# Patient Record
Sex: FEMALE | Race: WHITE | HISPANIC OR LATINO | URBAN - METROPOLITAN AREA
[De-identification: names, ages, dates, MRNs, and addresses within clinical notes are randomized per-mention and may not be internally consistent; named-entity substitution may affect disease eponyms.]

---

## 2017-06-28 ENCOUNTER — OUTPATIENT (OUTPATIENT)
Dept: OUTPATIENT SERVICES | Facility: HOSPITAL | Age: 52
LOS: 1 days | End: 2017-06-28

## 2017-06-28 ENCOUNTER — LABORATORY RESULT (OUTPATIENT)
Age: 52
End: 2017-06-28

## 2017-06-28 ENCOUNTER — APPOINTMENT (OUTPATIENT)
Dept: INTERNAL MEDICINE | Facility: HOSPITAL | Age: 52
End: 2017-06-28

## 2017-06-28 VITALS — BODY MASS INDEX: 32.27 KG/M2 | HEIGHT: 64 IN | WEIGHT: 189 LBS

## 2017-06-28 VITALS — SYSTOLIC BLOOD PRESSURE: 130 MMHG | DIASTOLIC BLOOD PRESSURE: 80 MMHG

## 2017-06-28 LAB
ALBUMIN SERPL ELPH-MCNC: 4.2 G/DL — SIGNIFICANT CHANGE UP (ref 3.3–5)
ALP SERPL-CCNC: 56 U/L — SIGNIFICANT CHANGE UP (ref 40–120)
ALT FLD-CCNC: 24 U/L — SIGNIFICANT CHANGE UP (ref 4–33)
AST SERPL-CCNC: 21 U/L — SIGNIFICANT CHANGE UP (ref 4–32)
BASOPHILS # BLD AUTO: 0.07 K/UL — SIGNIFICANT CHANGE UP (ref 0–0.2)
BASOPHILS NFR BLD AUTO: 0.9 % — SIGNIFICANT CHANGE UP (ref 0–2)
BILIRUB SERPL-MCNC: 0.2 MG/DL — SIGNIFICANT CHANGE UP (ref 0.2–1.2)
BUN SERPL-MCNC: 12 MG/DL — SIGNIFICANT CHANGE UP (ref 7–23)
CALCIUM SERPL-MCNC: 9.6 MG/DL — SIGNIFICANT CHANGE UP (ref 8.4–10.5)
CHLORIDE SERPL-SCNC: 103 MMOL/L — SIGNIFICANT CHANGE UP (ref 98–107)
CO2 SERPL-SCNC: 24 MMOL/L — SIGNIFICANT CHANGE UP (ref 22–31)
CREAT SERPL-MCNC: 0.78 MG/DL — SIGNIFICANT CHANGE UP (ref 0.5–1.3)
EOSINOPHIL # BLD AUTO: 0.36 K/UL — SIGNIFICANT CHANGE UP (ref 0–0.5)
EOSINOPHIL NFR BLD AUTO: 4.6 % — SIGNIFICANT CHANGE UP (ref 0–6)
GLUCOSE SERPL-MCNC: 77 MG/DL — SIGNIFICANT CHANGE UP (ref 70–99)
HBA1C BLD-MCNC: 6.1 % — HIGH (ref 4–5.6)
HCT VFR BLD CALC: 42.1 % — SIGNIFICANT CHANGE UP (ref 34.5–45)
HGB BLD-MCNC: 13.8 G/DL — SIGNIFICANT CHANGE UP (ref 11.5–15.5)
IMM GRANULOCYTES # BLD AUTO: 0.02 # — SIGNIFICANT CHANGE UP
IMM GRANULOCYTES NFR BLD AUTO: 0.3 % — SIGNIFICANT CHANGE UP (ref 0–1.5)
LYMPHOCYTES # BLD AUTO: 2.36 K/UL — SIGNIFICANT CHANGE UP (ref 1–3.3)
LYMPHOCYTES # BLD AUTO: 29.8 % — SIGNIFICANT CHANGE UP (ref 13–44)
MCHC RBC-ENTMCNC: 27.4 PG — SIGNIFICANT CHANGE UP (ref 27–34)
MCHC RBC-ENTMCNC: 32.8 % — SIGNIFICANT CHANGE UP (ref 32–36)
MCV RBC AUTO: 83.7 FL — SIGNIFICANT CHANGE UP (ref 80–100)
MONOCYTES # BLD AUTO: 0.56 K/UL — SIGNIFICANT CHANGE UP (ref 0–0.9)
MONOCYTES NFR BLD AUTO: 7.1 % — SIGNIFICANT CHANGE UP (ref 2–14)
NEUTROPHILS # BLD AUTO: 4.54 K/UL — SIGNIFICANT CHANGE UP (ref 1.8–7.4)
NEUTROPHILS NFR BLD AUTO: 57.3 % — SIGNIFICANT CHANGE UP (ref 43–77)
NRBC # FLD: 0 — SIGNIFICANT CHANGE UP
PLATELET # BLD AUTO: 296 K/UL — SIGNIFICANT CHANGE UP (ref 150–400)
PMV BLD: 10.3 FL — SIGNIFICANT CHANGE UP (ref 7–13)
POTASSIUM SERPL-MCNC: 3.9 MMOL/L — SIGNIFICANT CHANGE UP (ref 3.5–5.3)
POTASSIUM SERPL-SCNC: 3.9 MMOL/L — SIGNIFICANT CHANGE UP (ref 3.5–5.3)
PROT SERPL-MCNC: 7.7 G/DL — SIGNIFICANT CHANGE UP (ref 6–8.3)
RBC # BLD: 5.03 M/UL — SIGNIFICANT CHANGE UP (ref 3.8–5.2)
RBC # FLD: 12.1 % — SIGNIFICANT CHANGE UP (ref 10.3–14.5)
SODIUM SERPL-SCNC: 143 MMOL/L — SIGNIFICANT CHANGE UP (ref 135–145)
T4 FREE SERPL-MCNC: 1.33 NG/DL — SIGNIFICANT CHANGE UP (ref 0.9–1.8)
TSH SERPL-MCNC: 1.48 UIU/ML — SIGNIFICANT CHANGE UP (ref 0.27–4.2)
WBC # BLD: 7.91 K/UL — SIGNIFICANT CHANGE UP (ref 3.8–10.5)
WBC # FLD AUTO: 7.91 K/UL — SIGNIFICANT CHANGE UP (ref 3.8–10.5)

## 2017-06-30 DIAGNOSIS — Z00.00 ENCOUNTER FOR GENERAL ADULT MEDICAL EXAMINATION WITHOUT ABNORMAL FINDINGS: ICD-10-CM

## 2017-06-30 DIAGNOSIS — R73.03 PREDIABETES: ICD-10-CM

## 2017-06-30 DIAGNOSIS — I10 ESSENTIAL (PRIMARY) HYPERTENSION: ICD-10-CM

## 2017-06-30 DIAGNOSIS — E07.9 DISORDER OF THYROID, UNSPECIFIED: ICD-10-CM

## 2017-08-04 ENCOUNTER — APPOINTMENT (OUTPATIENT)
Dept: ENDOCRINOLOGY | Facility: HOSPITAL | Age: 52
End: 2017-08-04

## 2017-08-12 ENCOUNTER — APPOINTMENT (OUTPATIENT)
Dept: MAMMOGRAPHY | Facility: IMAGING CENTER | Age: 52
End: 2017-08-12

## 2017-08-12 ENCOUNTER — APPOINTMENT (OUTPATIENT)
Dept: OBGYN | Facility: HOSPITAL | Age: 52
End: 2017-08-12

## 2017-09-09 ENCOUNTER — APPOINTMENT (OUTPATIENT)
Dept: MAMMOGRAPHY | Facility: IMAGING CENTER | Age: 52
End: 2017-09-09

## 2017-09-09 ENCOUNTER — APPOINTMENT (OUTPATIENT)
Dept: OBGYN | Facility: HOSPITAL | Age: 52
End: 2017-09-09

## 2017-10-14 ENCOUNTER — OUTPATIENT (OUTPATIENT)
Dept: OUTPATIENT SERVICES | Facility: HOSPITAL | Age: 52
LOS: 1 days | End: 2017-10-14
Payer: COMMERCIAL

## 2017-10-14 ENCOUNTER — APPOINTMENT (OUTPATIENT)
Dept: OBGYN | Facility: HOSPITAL | Age: 52
End: 2017-10-14

## 2017-10-14 ENCOUNTER — OUTPATIENT (OUTPATIENT)
Dept: OUTPATIENT SERVICES | Facility: HOSPITAL | Age: 52
LOS: 1 days | End: 2017-10-14

## 2017-10-14 ENCOUNTER — APPOINTMENT (OUTPATIENT)
Dept: MAMMOGRAPHY | Facility: IMAGING CENTER | Age: 52
End: 2017-10-14
Payer: COMMERCIAL

## 2017-10-14 DIAGNOSIS — Z00.8 ENCOUNTER FOR OTHER GENERAL EXAMINATION: ICD-10-CM

## 2017-10-14 DIAGNOSIS — Z12.39 ENCOUNTER FOR OTHER SCREENING FOR MALIGNANT NEOPLASM OF BREAST: ICD-10-CM

## 2017-10-14 PROCEDURE — 77063 BREAST TOMOSYNTHESIS BI: CPT | Mod: 26

## 2017-10-14 PROCEDURE — G0202: CPT | Mod: 26

## 2017-10-14 PROCEDURE — 77067 SCR MAMMO BI INCL CAD: CPT

## 2017-10-14 PROCEDURE — 77063 BREAST TOMOSYNTHESIS BI: CPT

## 2018-01-24 ENCOUNTER — OUTPATIENT (OUTPATIENT)
Dept: OUTPATIENT SERVICES | Facility: HOSPITAL | Age: 53
LOS: 1 days | End: 2018-01-24

## 2018-01-24 ENCOUNTER — APPOINTMENT (OUTPATIENT)
Dept: INTERNAL MEDICINE | Facility: HOSPITAL | Age: 53
End: 2018-01-24
Payer: MEDICAID

## 2018-01-24 ENCOUNTER — LABORATORY RESULT (OUTPATIENT)
Age: 53
End: 2018-01-24

## 2018-01-24 VITALS — WEIGHT: 190 LBS | BODY MASS INDEX: 32.44 KG/M2 | HEIGHT: 64 IN

## 2018-01-24 VITALS — DIASTOLIC BLOOD PRESSURE: 78 MMHG | SYSTOLIC BLOOD PRESSURE: 130 MMHG

## 2018-01-24 LAB
ALBUMIN SERPL ELPH-MCNC: 4.3 G/DL — SIGNIFICANT CHANGE UP (ref 3.3–5)
ALP SERPL-CCNC: 60 U/L — SIGNIFICANT CHANGE UP (ref 40–120)
ALT FLD-CCNC: 23 U/L — SIGNIFICANT CHANGE UP (ref 4–33)
AST SERPL-CCNC: 22 U/L — SIGNIFICANT CHANGE UP (ref 4–32)
BASOPHILS # BLD AUTO: 0.1 K/UL — SIGNIFICANT CHANGE UP (ref 0–0.2)
BASOPHILS NFR BLD AUTO: 1.3 % — SIGNIFICANT CHANGE UP (ref 0–2)
BILIRUB SERPL-MCNC: 0.2 MG/DL — SIGNIFICANT CHANGE UP (ref 0.2–1.2)
BUN SERPL-MCNC: 11 MG/DL — SIGNIFICANT CHANGE UP (ref 7–23)
CALCIUM SERPL-MCNC: 9.2 MG/DL — SIGNIFICANT CHANGE UP (ref 8.4–10.5)
CHLORIDE SERPL-SCNC: 104 MMOL/L — SIGNIFICANT CHANGE UP (ref 98–107)
CHOLEST SERPL-MCNC: 142 MG/DL — SIGNIFICANT CHANGE UP (ref 120–199)
CO2 SERPL-SCNC: 25 MMOL/L — SIGNIFICANT CHANGE UP (ref 22–31)
CREAT SERPL-MCNC: 0.79 MG/DL — SIGNIFICANT CHANGE UP (ref 0.5–1.3)
EOSINOPHIL # BLD AUTO: 0.32 K/UL — SIGNIFICANT CHANGE UP (ref 0–0.5)
EOSINOPHIL NFR BLD AUTO: 4.3 % — SIGNIFICANT CHANGE UP (ref 0–6)
GLUCOSE SERPL-MCNC: 95 MG/DL — SIGNIFICANT CHANGE UP (ref 70–99)
HBA1C BLD-MCNC: 6.1 % — HIGH (ref 4–5.6)
HCT VFR BLD CALC: 42.3 % — SIGNIFICANT CHANGE UP (ref 34.5–45)
HDLC SERPL-MCNC: 52 MG/DL — SIGNIFICANT CHANGE UP (ref 45–65)
HGB BLD-MCNC: 13.4 G/DL — SIGNIFICANT CHANGE UP (ref 11.5–15.5)
IMM GRANULOCYTES # BLD AUTO: 0.02 # — SIGNIFICANT CHANGE UP
IMM GRANULOCYTES NFR BLD AUTO: 0.3 % — SIGNIFICANT CHANGE UP (ref 0–1.5)
LIPID PNL WITH DIRECT LDL SERPL: 81 MG/DL — SIGNIFICANT CHANGE UP
LYMPHOCYTES # BLD AUTO: 2.62 K/UL — SIGNIFICANT CHANGE UP (ref 1–3.3)
LYMPHOCYTES # BLD AUTO: 35.3 % — SIGNIFICANT CHANGE UP (ref 13–44)
MCHC RBC-ENTMCNC: 26.7 PG — LOW (ref 27–34)
MCHC RBC-ENTMCNC: 31.7 % — LOW (ref 32–36)
MCV RBC AUTO: 84.3 FL — SIGNIFICANT CHANGE UP (ref 80–100)
MONOCYTES # BLD AUTO: 0.54 K/UL — SIGNIFICANT CHANGE UP (ref 0–0.9)
MONOCYTES NFR BLD AUTO: 7.3 % — SIGNIFICANT CHANGE UP (ref 2–14)
NEUTROPHILS # BLD AUTO: 3.83 K/UL — SIGNIFICANT CHANGE UP (ref 1.8–7.4)
NEUTROPHILS NFR BLD AUTO: 51.5 % — SIGNIFICANT CHANGE UP (ref 43–77)
NRBC # FLD: 0 — SIGNIFICANT CHANGE UP
PLATELET # BLD AUTO: 333 K/UL — SIGNIFICANT CHANGE UP (ref 150–400)
PMV BLD: 10.2 FL — SIGNIFICANT CHANGE UP (ref 7–13)
POTASSIUM SERPL-MCNC: 3.7 MMOL/L — SIGNIFICANT CHANGE UP (ref 3.5–5.3)
POTASSIUM SERPL-SCNC: 3.7 MMOL/L — SIGNIFICANT CHANGE UP (ref 3.5–5.3)
PROT SERPL-MCNC: 7.6 G/DL — SIGNIFICANT CHANGE UP (ref 6–8.3)
RBC # BLD: 5.02 M/UL — SIGNIFICANT CHANGE UP (ref 3.8–5.2)
RBC # FLD: 12.1 % — SIGNIFICANT CHANGE UP (ref 10.3–14.5)
SODIUM SERPL-SCNC: 143 MMOL/L — SIGNIFICANT CHANGE UP (ref 135–145)
T4 FREE SERPL-MCNC: 1.42 NG/DL — SIGNIFICANT CHANGE UP (ref 0.9–1.8)
TRIGL SERPL-MCNC: 119 MG/DL — SIGNIFICANT CHANGE UP (ref 10–149)
TSH SERPL-MCNC: 1.04 UIU/ML — SIGNIFICANT CHANGE UP (ref 0.27–4.2)
WBC # BLD: 7.43 K/UL — SIGNIFICANT CHANGE UP (ref 3.8–10.5)
WBC # FLD AUTO: 7.43 K/UL — SIGNIFICANT CHANGE UP (ref 3.8–10.5)

## 2018-01-24 PROCEDURE — 99213 OFFICE O/P EST LOW 20 MIN: CPT | Mod: GE

## 2018-01-24 RX ORDER — HYDROCORTISONE 10 MG/G
1 OINTMENT TOPICAL
Qty: 1 | Refills: 0 | Status: DISCONTINUED | COMMUNITY
Start: 2017-06-28 | End: 2018-01-24

## 2018-01-25 DIAGNOSIS — E66.9 OBESITY, UNSPECIFIED: ICD-10-CM

## 2018-01-25 DIAGNOSIS — Z00.00 ENCOUNTER FOR GENERAL ADULT MEDICAL EXAMINATION WITHOUT ABNORMAL FINDINGS: ICD-10-CM

## 2018-01-25 DIAGNOSIS — R73.03 PREDIABETES: ICD-10-CM

## 2018-01-25 DIAGNOSIS — E04.2 NONTOXIC MULTINODULAR GOITER: ICD-10-CM

## 2018-01-25 DIAGNOSIS — I10 ESSENTIAL (PRIMARY) HYPERTENSION: ICD-10-CM

## 2018-02-05 ENCOUNTER — APPOINTMENT (OUTPATIENT)
Dept: OPHTHALMOLOGY | Facility: CLINIC | Age: 53
End: 2018-02-05

## 2018-02-05 ENCOUNTER — OUTPATIENT (OUTPATIENT)
Dept: OUTPATIENT SERVICES | Facility: HOSPITAL | Age: 53
LOS: 1 days | End: 2018-02-05

## 2018-03-30 ENCOUNTER — OUTPATIENT (OUTPATIENT)
Dept: OUTPATIENT SERVICES | Facility: HOSPITAL | Age: 53
LOS: 1 days | End: 2018-03-30

## 2018-03-30 ENCOUNTER — APPOINTMENT (OUTPATIENT)
Dept: ENDOCRINOLOGY | Facility: HOSPITAL | Age: 53
End: 2018-03-30

## 2018-03-30 VITALS
HEART RATE: 84 BPM | HEIGHT: 64 IN | SYSTOLIC BLOOD PRESSURE: 148 MMHG | WEIGHT: 194 LBS | BODY MASS INDEX: 33.12 KG/M2 | DIASTOLIC BLOOD PRESSURE: 80 MMHG

## 2018-04-02 DIAGNOSIS — E04.2 NONTOXIC MULTINODULAR GOITER: ICD-10-CM

## 2018-04-02 DIAGNOSIS — R73.03 PREDIABETES: ICD-10-CM

## 2018-04-11 ENCOUNTER — APPOINTMENT (OUTPATIENT)
Dept: ULTRASOUND IMAGING | Facility: IMAGING CENTER | Age: 53
End: 2018-04-11
Payer: MEDICAID

## 2018-04-11 ENCOUNTER — OUTPATIENT (OUTPATIENT)
Dept: OUTPATIENT SERVICES | Facility: HOSPITAL | Age: 53
LOS: 1 days | End: 2018-04-11
Payer: MEDICAID

## 2018-04-11 DIAGNOSIS — E04.2 NONTOXIC MULTINODULAR GOITER: ICD-10-CM

## 2018-04-11 PROCEDURE — 76536 US EXAM OF HEAD AND NECK: CPT

## 2018-04-11 PROCEDURE — 76536 US EXAM OF HEAD AND NECK: CPT | Mod: 26

## 2018-07-17 ENCOUNTER — RX RENEWAL (OUTPATIENT)
Age: 53
End: 2018-07-17

## 2018-09-28 ENCOUNTER — APPOINTMENT (OUTPATIENT)
Dept: ENDOCRINOLOGY | Facility: HOSPITAL | Age: 53
End: 2018-09-28

## 2018-11-05 ENCOUNTER — MESSAGE (OUTPATIENT)
Age: 53
End: 2018-11-05

## 2018-11-17 ENCOUNTER — OUTPATIENT (OUTPATIENT)
Dept: OUTPATIENT SERVICES | Facility: HOSPITAL | Age: 53
LOS: 1 days | End: 2018-11-17
Payer: COMMERCIAL

## 2018-11-17 ENCOUNTER — APPOINTMENT (OUTPATIENT)
Dept: MAMMOGRAPHY | Facility: IMAGING CENTER | Age: 53
End: 2018-11-17
Payer: COMMERCIAL

## 2018-11-17 ENCOUNTER — APPOINTMENT (OUTPATIENT)
Dept: OBGYN | Facility: HOSPITAL | Age: 53
End: 2018-11-17

## 2018-11-17 ENCOUNTER — OUTPATIENT (OUTPATIENT)
Dept: OUTPATIENT SERVICES | Facility: HOSPITAL | Age: 53
LOS: 1 days | End: 2018-11-17

## 2018-11-17 DIAGNOSIS — Z12.39 ENCOUNTER FOR OTHER SCREENING FOR MALIGNANT NEOPLASM OF BREAST: ICD-10-CM

## 2018-11-17 DIAGNOSIS — Z00.8 ENCOUNTER FOR OTHER GENERAL EXAMINATION: ICD-10-CM

## 2018-11-17 PROCEDURE — 77067 SCR MAMMO BI INCL CAD: CPT | Mod: 26

## 2018-11-17 PROCEDURE — 77067 SCR MAMMO BI INCL CAD: CPT

## 2018-11-17 PROCEDURE — 77063 BREAST TOMOSYNTHESIS BI: CPT | Mod: 26

## 2018-11-17 PROCEDURE — 77063 BREAST TOMOSYNTHESIS BI: CPT

## 2018-11-19 ENCOUNTER — APPOINTMENT (OUTPATIENT)
Dept: INTERNAL MEDICINE | Facility: HOSPITAL | Age: 53
End: 2018-11-19

## 2019-01-02 ENCOUNTER — RX RENEWAL (OUTPATIENT)
Age: 54
End: 2019-01-02

## 2019-01-07 ENCOUNTER — LABORATORY RESULT (OUTPATIENT)
Age: 54
End: 2019-01-07

## 2019-01-07 ENCOUNTER — APPOINTMENT (OUTPATIENT)
Dept: INTERNAL MEDICINE | Facility: HOSPITAL | Age: 54
End: 2019-01-07
Payer: MEDICAID

## 2019-01-07 ENCOUNTER — OUTPATIENT (OUTPATIENT)
Dept: OUTPATIENT SERVICES | Facility: HOSPITAL | Age: 54
LOS: 1 days | End: 2019-01-07

## 2019-01-07 VITALS — HEIGHT: 66 IN | WEIGHT: 197 LBS | BODY MASS INDEX: 31.66 KG/M2

## 2019-01-07 VITALS — RESPIRATION RATE: 14 BRPM | HEART RATE: 78 BPM | SYSTOLIC BLOOD PRESSURE: 134 MMHG | DIASTOLIC BLOOD PRESSURE: 84 MMHG

## 2019-01-07 DIAGNOSIS — E04.1 NONTOXIC SINGLE THYROID NODULE: ICD-10-CM

## 2019-01-07 DIAGNOSIS — E07.9 DISORDER OF THYROID, UNSPECIFIED: ICD-10-CM

## 2019-01-07 DIAGNOSIS — Z87.442 PERSONAL HISTORY OF URINARY CALCULI: ICD-10-CM

## 2019-01-07 LAB
ALBUMIN SERPL ELPH-MCNC: 4.5 G/DL — SIGNIFICANT CHANGE UP (ref 3.3–5)
ALP SERPL-CCNC: 60 U/L — SIGNIFICANT CHANGE UP (ref 40–120)
ALT FLD-CCNC: 25 U/L — SIGNIFICANT CHANGE UP (ref 4–33)
AST SERPL-CCNC: 22 U/L — SIGNIFICANT CHANGE UP (ref 4–32)
BILIRUB SERPL-MCNC: 0.3 MG/DL — SIGNIFICANT CHANGE UP (ref 0.2–1.2)
BUN SERPL-MCNC: 10 MG/DL — SIGNIFICANT CHANGE UP (ref 7–23)
CALCIUM SERPL-MCNC: 9.5 MG/DL — SIGNIFICANT CHANGE UP (ref 8.4–10.5)
CHLORIDE SERPL-SCNC: 107 MMOL/L — SIGNIFICANT CHANGE UP (ref 98–107)
CHOLEST SERPL-MCNC: 153 MG/DL — SIGNIFICANT CHANGE UP (ref 120–199)
CO2 SERPL-SCNC: 25 MMOL/L — SIGNIFICANT CHANGE UP (ref 22–31)
CREAT SERPL-MCNC: 0.67 MG/DL — SIGNIFICANT CHANGE UP (ref 0.5–1.3)
GLUCOSE SERPL-MCNC: 99 MG/DL — SIGNIFICANT CHANGE UP (ref 70–99)
HBA1C BLD-MCNC: 6.2 % — HIGH (ref 4–5.6)
HDLC SERPL-MCNC: 54 MG/DL — SIGNIFICANT CHANGE UP (ref 45–65)
HEMOCCULT STL QL IA: NEGATIVE
LIPID PNL WITH DIRECT LDL SERPL: 93 MG/DL — SIGNIFICANT CHANGE UP
POTASSIUM SERPL-MCNC: 3.8 MMOL/L — SIGNIFICANT CHANGE UP (ref 3.5–5.3)
POTASSIUM SERPL-SCNC: 3.8 MMOL/L — SIGNIFICANT CHANGE UP (ref 3.5–5.3)
PROT SERPL-MCNC: 7.8 G/DL — SIGNIFICANT CHANGE UP (ref 6–8.3)
SODIUM SERPL-SCNC: 143 MMOL/L — SIGNIFICANT CHANGE UP (ref 135–145)
T4 FREE SERPL-MCNC: 1.4 NG/DL — SIGNIFICANT CHANGE UP (ref 0.9–1.8)
TRIGL SERPL-MCNC: 75 MG/DL — SIGNIFICANT CHANGE UP (ref 10–149)
TSH SERPL-MCNC: 0.96 UIU/ML — SIGNIFICANT CHANGE UP (ref 0.27–4.2)

## 2019-01-07 PROCEDURE — 99396 PREV VISIT EST AGE 40-64: CPT | Mod: GC

## 2019-01-09 DIAGNOSIS — R73.03 PREDIABETES: ICD-10-CM

## 2019-01-09 DIAGNOSIS — E66.9 OBESITY, UNSPECIFIED: ICD-10-CM

## 2019-01-09 DIAGNOSIS — Z00.00 ENCOUNTER FOR GENERAL ADULT MEDICAL EXAMINATION WITHOUT ABNORMAL FINDINGS: ICD-10-CM

## 2019-01-09 DIAGNOSIS — E04.2 NONTOXIC MULTINODULAR GOITER: ICD-10-CM

## 2019-01-09 DIAGNOSIS — I10 ESSENTIAL (PRIMARY) HYPERTENSION: ICD-10-CM

## 2019-01-09 NOTE — REVIEW OF SYSTEMS
[Fatigue] : fatigue [Negative] : Heme/Lymph [Fever] : no fever [Chills] : no chills [Hot Flashes] : no hot flashes [Night Sweats] : no night sweats [Recent Change In Weight] : ~T no recent weight change

## 2019-01-09 NOTE — ASSESSMENT
[FreeTextEntry1] : 53F Urdu-speaking  with HTN, pre-DM (HgbA1c 6.1), obesity, multinodular goiter, h/o nephrolithiasis presents for her annual physical exam. \par \par # Multinodular Goiter: asymptomatic aside from longstanding fatigue\par - Will check TSH and FT4\par - Endocrinology referral\par - Will task endocrinology to find out what type of imaging they would like pt to receive prior to her appointment\par \par # HTN: /84 today\par - Continue amlodipine 10 mg, lisinopril 10 mg, metoprolol tartrate 50 mg BID; renewed all today\par - Prescribed BP cuff, instructed pt to document BP at home\par \par # Obesity\par - Discussed low carbohydrate diet and exercise\par \par # HCM\par - HgbA1c and lipid panel today\par - Mammogram 11/2018 birads 2, repeat in 1 year\par - Cervical cancer screening 2/2018 negative\par - FIT test negative in 2017, pt just sent repeat from Gyn clinic - will follow up\par - Provided GI referral for colonoscopy, pt agreeable\par - Tdap 2014\par - Pt did not receive influenza vaccine but will instruct her to get it at her pharmacy when I call to discuss lab results\par

## 2019-01-09 NOTE — HISTORY OF PRESENT ILLNESS
[FreeTextEntry1] : I'm here for my physical exam [de-identified] : 53F Montserratian-speaking  with HTN, pre-DM (HgbA1c 6.1), obesity, multinodular goiter, h/o nephrolithiasis presents for her annual physical exam. She has no complaints, ROS positive only for fatigue. Her fatigue is longstanding and unchanged, worse when her children are in school because she wakes up at 6 am to help them get to school, goes to bed late around midnight. No weight loss, appetite changes, or symptoms of depression. No anxiety, palpitations, or diarrhea. Pt had a thyroid ultrasound for follow up of her multinodular goiter. It was unchanged from previous with b/l thyroid nodules largest was on L 6.1 x 5 x 4.4 cm but with solid components. Pt had a follow up endocrinology appointment 2018 which she missed. Pt says she would like to make sure all of her cancer screening is up to date as her brother  of gastric cancer and a second brother was recently diagnosed with hematologic malignancy. \par \par  IDs: [id number], [id number]\par \par \par \par

## 2019-01-09 NOTE — PHYSICAL EXAM
[No Acute Distress] : no acute distress [Well-Appearing] : well-appearing [Normal Sclera/Conjunctiva] : normal sclera/conjunctiva [EOMI] : extraocular movements intact [Normal Outer Ear/Nose] : the outer ears and nose were normal in appearance [Normal Oropharynx] : the oropharynx was normal [No JVD] : no jugular venous distention [No Lymphadenopathy] : no lymphadenopathy [Clear to Auscultation] : lungs were clear to auscultation bilaterally [No Accessory Muscle Use] : no accessory muscle use [Normal Rate] : normal rate  [Regular Rhythm] : with a regular rhythm [Normal S1, S2] : normal S1 and S2 [No Edema] : there was no peripheral edema [No Extremity Clubbing/Cyanosis] : no extremity clubbing/cyanosis [Soft] : abdomen soft [Non Tender] : non-tender [Non-distended] : non-distended [Normal Bowel Sounds] : normal bowel sounds [Normal Posterior Cervical Nodes] : no posterior cervical lymphadenopathy [Normal Anterior Cervical Nodes] : no anterior cervical lymphadenopathy [No CVA Tenderness] : no CVA  tenderness [No Spinal Tenderness] : no spinal tenderness [No Joint Swelling] : no joint swelling [Grossly Normal Strength/Tone] : grossly normal strength/tone [Normal Gait] : normal gait [No Focal Deficits] : no focal deficits [Normal Affect] : the affect was normal [Alert and Oriented x3] : oriented to person, place, and time [Normal Mood] : the mood was normal [de-identified] : enlarged thyroid, thyroid nodules L >>R

## 2019-02-01 ENCOUNTER — APPOINTMENT (OUTPATIENT)
Dept: ENDOCRINOLOGY | Facility: HOSPITAL | Age: 54
End: 2019-02-01
Payer: MEDICAID

## 2019-02-01 ENCOUNTER — OUTPATIENT (OUTPATIENT)
Dept: OUTPATIENT SERVICES | Facility: HOSPITAL | Age: 54
LOS: 1 days | End: 2019-02-01

## 2019-02-01 VITALS
DIASTOLIC BLOOD PRESSURE: 75 MMHG | WEIGHT: 197 LBS | BODY MASS INDEX: 31.66 KG/M2 | SYSTOLIC BLOOD PRESSURE: 138 MMHG | HEIGHT: 66 IN | HEART RATE: 96 BPM

## 2019-02-01 DIAGNOSIS — R73.03 PREDIABETES: ICD-10-CM

## 2019-02-01 DIAGNOSIS — E04.2 NONTOXIC MULTINODULAR GOITER: ICD-10-CM

## 2019-02-01 PROCEDURE — 99214 OFFICE O/P EST MOD 30 MIN: CPT | Mod: GC

## 2019-02-01 NOTE — END OF VISIT
[] : Fellow [FreeTextEntry3] : Agree with Dr. Moseley plan above. The patient has long-standing goiter which was biopsied in 2013.  Pathology results are not available in EMR,  Dr. Washington is going to call and obtain results if possible. She has no compressive symptoms, the nodule has remained stable in size Thyroid US surveillance for now and if no pathology can be obtained, can consider repeat USGFNA based on size [Time Spent: ___ minutes] : I have spent [unfilled] minutes of face to face time with the patient

## 2019-02-01 NOTE — DATA REVIEWED
[FreeTextEntry1] : EXAM:  US THYROID PARATHYROID     PROCEDURE DATE:  04/11/2018       INTERPRETATION:  CLINICAL INFORMATION: 53-year-old female for follow-up  thyroid nodules.  COMPARISON: 10/25/2016  TECHNIQUE: Sonography of the thyroid.   FINDINGS:  Right Lobe: 4.9 x 2 x 2.1 cm. Mid colloid nodule and lower pole nodule  with features of colloid nodule and coarse calcification 1.2 x 0.9 x 0.7  cm unchanged.  Left Lobe: 8.2 x 3.8 x 4.5 cm. Mid to lower pole soft tissue nodule with  solid components and coarse calcification 6.1 x 5 x 4.4 cm, unchanged  upon remeasurement of the nodule on the prior examination.   Isthmus: 4 mm.      Cervical Lymph Nodes: No enlarged or abnormal morphology cervical nodes.  IMPRESSION:   Extensive left lobe nodule unchanged from prior examination.  \par \par EXAM:  US THYROID PARATHYROID     PROCEDURE DATE:  10/25/2016     INTERPRETATION:  CLINICAL INFORMATION: 51-year-old female for follow-up  thyroid nodules.  COMPARISON: Ultrasound 05/02/2013  TECHNIQUE: Sonography of the thyroid.   FINDINGS:  RIGHT LOBE: 4.8 x 2.1 x 1.6 cm. Colloid nodule measuring 1.2 x 0.7 x 1.2  cm mid lobe and 1 x 0.7 x 0.8 cm lower pole nodule with coarse  calcifications unchanged in size.  LEFT LOBE: 7 x 4.6 x 4 cm. Extensive complex lower pole nodule decreased  in size since prior examination. 5.9 x 3.9 x 3.9 cm with coarse  calcifications.  ISTHMUS: 5 mm   CERVICAL NODES:No enlarged or abnormal morphology cervical nodes.  IMPRESSION:   Multinodular goiter with dominant left nodule slightly decreased in size.

## 2019-02-01 NOTE — PHYSICAL EXAM
[Alert] : alert [No Acute Distress] : no acute distress [Normal Sclera/Conjunctiva] : normal sclera/conjunctiva [No Proptosis] : no proptosis [No Neck Mass] : no neck mass was observed [Supple] : the neck was supple [No Respiratory Distress] : no respiratory distress [Normal Rate and Effort] : normal respiratory rhythm and effort [No Accessory Muscle Use] : no accessory muscle use [Clear to Auscultation] : lungs were clear to auscultation bilaterally [Normal Rate] : heart rate was normal  [Normal S1, S2] : normal S1 and S2 [Regular Rhythm] : with a regular rhythm [No Edema] : there was no peripheral edema [Normal Bowel Sounds] : normal bowel sounds [Not Tender] : non-tender [Soft] : abdomen soft [Not Distended] : not distended [No Masses] : no abdominal mass palpated [Normal Gait] : normal gait [No Clubbing, Cyanosis] : no clubbing  or cyanosis of the fingernails [Normal Strength/Tone] : muscle strength and tone were normal [No Motor Deficits] : the motor exam was normal [No Tremors] : no tremors [Oriented x3] : oriented to person, place, and time [Normal Insight/Judgement] : insight and judgment were intact [Normal Affect] : the affect was normal [Normal Mood] : the mood was normal [Abdominal Striae] : no abdominal striae [Acanthosis Nigricans] : no acanthosis nigricans [de-identified] : + right mid pole nodule, + large left thyroid nodule

## 2019-02-01 NOTE — ASSESSMENT
[FreeTextEntry1] : 52 y/o F with multinodular goiter and pre-diabetes\par \par 1. Multinodular goiter: appears stable\par - patient biochemically euthyroid on TFTs from January 2019\par - will repeat thyroid ultrasound \par - thyroid ultrasound images from April 2018 personally reviewed - left thyroid nodule appears to be entire left thyroid lobe, consistent with left goiter\par - previous pathology report from [facility] in 2013 unavailable - will try to obtain pathology report\par -  if previous pathology is benign and thyroid ultrasound reveal stability of large left thyroid nodule/goiter, will not repeat FNA \par - if patient develops symptoms from goiter (such as dyspnea, dysphagia, etc,) in the future - she can consider elective surgery\par \par 2. Pre-diabetes: most recent HbA1c 6.2% in January 2019. C/w diet and lifestyle modifications to prevent development of DM2\par \par Return visit in 6 months.

## 2019-02-01 NOTE — REVIEW OF SYSTEMS
[Hair Loss] : hair loss [Dry Skin] : dry skin [Heat Intolerance] : heat intolerant [Recent Weight Gain (___ Lbs)] : no recent weight gain [Recent Weight Loss (___ Lbs)] : no recent weight loss [Fever] : no fever [Chills] : no chills [Dysphagia] : no dysphagia [Dysphonia] : no dysphonia [Neck Pain] : no neck pain [Chest Pain] : no chest pain [Palpitations] : no palpitations [Lower Ext Edema] : no lower extremity edema [Shortness Of Breath] : no shortness of breath [Cough] : no cough [Nausea] : no nausea [Vomiting] : no vomiting was observed [Constipation] : no constipation [Diarrhea] : no diarrhea [Abdominal Pain] : no abdominal pain [Tremors] : no tremors [Cold Intolerance] : cold tolerant [Hot Flashes] : no hot flashes [Negative] : Integumentary

## 2019-02-01 NOTE — HISTORY OF PRESENT ILLNESS
[FreeTextEntry1] : Patient is a 52 y/o F here for follow up of multinodular goiter. [facility]  ID # [id number]. Patient was diagnosed with thyroid nodules 6 years ago when she had an ultrasound. States she had a biopsy of dominant left thyroid nodule, reportedly benign. No pathology report available at this time (FNA was done in 2013 on 6 cm left thyroid nodule - document available in [facility] stating that FNA was performed and progress note stating benign FNA, however no pathology available). Has never been on thyroid medications. \par \par She denies neck pain, dysphagia, dysphonia. No chest pain or SOB. No palpitations. No current abdominal pain, nausea, vomiting, diarrhea, constipation. Hair always falling out, but less so recently. Has dry skin. Reports heat intolerance, no cold intolerance. Weight has been stable. She states that she was on levothyroxine during one of her pregnancies about 18 years ago but has not been on thyroidal medications since then. She has no history of RT to the neck or surgery to the neck. Has never been on amiodarone or lithium. Reports her son was recently diagnosed with a thyroid problem, but does not know what it is. \par \par Patient also has a history of pre-diabetes. Last HbA1c was 6.2 in January 2019. Weight stable.

## 2019-02-05 ENCOUNTER — APPOINTMENT (OUTPATIENT)
Dept: OPHTHALMOLOGY | Facility: CLINIC | Age: 54
End: 2019-02-05

## 2019-02-06 ENCOUNTER — FORM ENCOUNTER (OUTPATIENT)
Age: 54
End: 2019-02-06

## 2019-02-07 ENCOUNTER — APPOINTMENT (OUTPATIENT)
Dept: ULTRASOUND IMAGING | Facility: IMAGING CENTER | Age: 54
End: 2019-02-07
Payer: MEDICAID

## 2019-02-07 ENCOUNTER — OUTPATIENT (OUTPATIENT)
Dept: OUTPATIENT SERVICES | Facility: HOSPITAL | Age: 54
LOS: 1 days | End: 2019-02-07
Payer: MEDICAID

## 2019-02-07 DIAGNOSIS — E04.2 NONTOXIC MULTINODULAR GOITER: ICD-10-CM

## 2019-02-07 PROCEDURE — 76536 US EXAM OF HEAD AND NECK: CPT

## 2019-02-07 PROCEDURE — 76536 US EXAM OF HEAD AND NECK: CPT | Mod: 26

## 2019-03-18 ENCOUNTER — APPOINTMENT (OUTPATIENT)
Dept: INTERNAL MEDICINE | Facility: HOSPITAL | Age: 54
End: 2019-03-18

## 2019-04-24 ENCOUNTER — OUTPATIENT (OUTPATIENT)
Dept: OUTPATIENT SERVICES | Facility: HOSPITAL | Age: 54
LOS: 1 days | End: 2019-04-24

## 2019-04-24 ENCOUNTER — RESULT CHARGE (OUTPATIENT)
Age: 54
End: 2019-04-24

## 2019-04-24 ENCOUNTER — APPOINTMENT (OUTPATIENT)
Dept: INTERNAL MEDICINE | Facility: HOSPITAL | Age: 54
End: 2019-04-24
Payer: MEDICAID

## 2019-04-24 ENCOUNTER — LABORATORY RESULT (OUTPATIENT)
Age: 54
End: 2019-04-24

## 2019-04-24 VITALS — BODY MASS INDEX: 31.18 KG/M2 | HEIGHT: 66 IN | WEIGHT: 194 LBS

## 2019-04-24 VITALS — SYSTOLIC BLOOD PRESSURE: 139 MMHG | HEART RATE: 75 BPM | DIASTOLIC BLOOD PRESSURE: 80 MMHG

## 2019-04-24 DIAGNOSIS — I10 ESSENTIAL (PRIMARY) HYPERTENSION: ICD-10-CM

## 2019-04-24 DIAGNOSIS — H54.7 UNSPECIFIED VISUAL LOSS: ICD-10-CM

## 2019-04-24 DIAGNOSIS — E66.9 OBESITY, UNSPECIFIED: ICD-10-CM

## 2019-04-24 DIAGNOSIS — J30.2 OTHER SEASONAL ALLERGIC RHINITIS: ICD-10-CM

## 2019-04-24 DIAGNOSIS — R73.03 PREDIABETES: ICD-10-CM

## 2019-04-24 LAB — HBA1C BLD-MCNC: 6.2 % — HIGH (ref 4–5.6)

## 2019-04-24 PROCEDURE — 99214 OFFICE O/P EST MOD 30 MIN: CPT | Mod: GC

## 2019-04-24 NOTE — PHYSICAL EXAM
[No Acute Distress] : no acute distress [Well Nourished] : well nourished [Well Developed] : well developed [Well-Appearing] : well-appearing [Normal Sclera/Conjunctiva] : normal sclera/conjunctiva [PERRL] : pupils equal round and reactive to light [EOMI] : extraocular movements intact [Normal Outer Ear/Nose] : the outer ears and nose were normal in appearance [Normal Oropharynx] : the oropharynx was normal [Supple] : supple [No Respiratory Distress] : no respiratory distress  [Clear to Auscultation] : lungs were clear to auscultation bilaterally [Normal Rate] : normal rate  [No Accessory Muscle Use] : no accessory muscle use [Regular Rhythm] : with a regular rhythm [No Murmur] : no murmur heard [Normal S1, S2] : normal S1 and S2 [No Edema] : there was no peripheral edema [No Extremity Clubbing/Cyanosis] : no extremity clubbing/cyanosis [Soft] : abdomen soft [Non Tender] : non-tender [Non-distended] : non-distended [No Masses] : no abdominal mass palpated [No HSM] : no HSM [Normal Bowel Sounds] : normal bowel sounds

## 2019-04-25 ENCOUNTER — RESULT REVIEW (OUTPATIENT)
Age: 54
End: 2019-04-25

## 2019-04-25 ENCOUNTER — APPOINTMENT (OUTPATIENT)
Dept: GASTROENTEROLOGY | Facility: HOSPITAL | Age: 54
End: 2019-04-25

## 2019-04-25 NOTE — HISTORY OF PRESENT ILLNESS
[de-identified] : 53F Slovak-speaking with HTN, pre-DM (HgbA1c 6.2), obesity, multinodular goiter, h/o nephrolithiasis presents for her follow up\par \par Patient has no complaints. Has GI appt next week for colonosocpy. BPs at home [id number]. \par \par  IDs:\par \par ROS otherwise negative. \par

## 2019-04-25 NOTE — ASSESSMENT
[FreeTextEntry1] : 54F Irish-speaking with HTN, pre-DM (HgbA1c 6.2), obesity, multinodular goiter, h/o nephrolithiasis presents for her annual physical exam. \par \par # Multinodular Goiter\par -Nodules stable\par -TFTS wnl \par \par # HTN: Hypertensive \par - Continue amlodipine 10 mg, lisinopril 10 mg, metoprolol tartrate 50 mg BID\par -Continues to be hypertensive, discussed with patient extensively for weight loss and cardiac exercise\par -If no improvement in 3 months, will switch from metoprolol to thiazide for optimal BP management \par \par # Obesity\par - Discussed low carbohydrate diet and exercise\par \par # HCM\par - HgbA1c today\par - Mammogram 11/2018 birads 2, repeat in 1 year\par - Cervical cancer screening 2/2018 negative\par - FIT test negative in 2017, pt just sent repeat from Gyn clinic - will follow up\par - Colonoscopy plan for next week \par - Tdap 2014\par - Influenza vaccine [facility]\par . \par Case discussed with Dr. Gill\par \par RTC in 3 months for BP management \par \par PC, PGY2 \par

## 2019-05-01 LAB — GLUCOSE BLDC GLUCOMTR-MCNC: 95

## 2019-05-09 ENCOUNTER — APPOINTMENT (OUTPATIENT)
Dept: DERMATOLOGY | Facility: CLINIC | Age: 54
End: 2019-05-09
Payer: MEDICAID

## 2019-05-09 VITALS — WEIGHT: 194 LBS | BODY MASS INDEX: 31.18 KG/M2 | HEIGHT: 66 IN

## 2019-05-09 DIAGNOSIS — D69.2 OTHER NONTHROMBOCYTOPENIC PURPURA: ICD-10-CM

## 2019-05-09 DIAGNOSIS — D23.9 OTHER BENIGN NEOPLASM OF SKIN, UNSPECIFIED: ICD-10-CM

## 2019-05-09 DIAGNOSIS — Z91.89 OTHER SPECIFIED PERSONAL RISK FACTORS, NOT ELSEWHERE CLASSIFIED: ICD-10-CM

## 2019-05-09 PROCEDURE — 99203 OFFICE O/P NEW LOW 30 MIN: CPT

## 2019-05-15 ENCOUNTER — APPOINTMENT (OUTPATIENT)
Dept: CARDIOLOGY | Facility: CLINIC | Age: 54
End: 2019-05-15
Payer: MEDICAID

## 2019-05-15 VITALS
BODY MASS INDEX: 31.66 KG/M2 | SYSTOLIC BLOOD PRESSURE: 143 MMHG | OXYGEN SATURATION: 98 % | WEIGHT: 197 LBS | HEIGHT: 66 IN | DIASTOLIC BLOOD PRESSURE: 80 MMHG | TEMPERATURE: 97.9 F | HEART RATE: 83 BPM

## 2019-05-15 PROCEDURE — 99204 OFFICE O/P NEW MOD 45 MIN: CPT

## 2019-05-15 NOTE — PHYSICAL EXAM
[Normal Appearance] : normal appearance [General Appearance - Well Developed] : well developed [Well Groomed] : well groomed [Normal Conjunctiva] : the conjunctiva exhibited no abnormalities [No Deformities] : no deformities [General Appearance - In No Acute Distress] : no acute distress [General Appearance - Well Nourished] : well nourished [Eyelids - No Xanthelasma] : the eyelids demonstrated no xanthelasmas [Normal Oral Mucosa] : normal oral mucosa [No Oral Pallor] : no oral pallor [No Oral Cyanosis] : no oral cyanosis [Normal Jugular Venous A Waves Present] : normal jugular venous A waves present [No Jugular Venous Zepeda A Waves] : no jugular venous zepeda A waves [Heart Rate And Rhythm] : heart rate and rhythm were normal [Normal Jugular Venous V Waves Present] : normal jugular venous V waves present [Heart Sounds] : normal S1 and S2 [Murmurs] : no murmurs present [Auscultation Breath Sounds / Voice Sounds] : lungs were clear to auscultation bilaterally [Exaggerated Use Of Accessory Muscles For Inspiration] : no accessory muscle use [Respiration, Rhythm And Depth] : normal respiratory rhythm and effort [Abdomen Mass (___ Cm)] : no abdominal mass palpated [Abdomen Tenderness] : non-tender [Abdomen Soft] : soft [Gait - Sufficient For Exercise Testing] : the gait was sufficient for exercise testing [Abnormal Walk] : normal gait [Nail Clubbing] : no clubbing of the fingernails [Cyanosis, Localized] : no localized cyanosis [] : no ischemic changes [Petechial Hemorrhages (___cm)] : no petechial hemorrhages [FreeTextEntry1] : + pitting edema.  hyperpigmentation.  very midl reticular veins scattered (very mild).  strong pedal pulses.

## 2019-05-15 NOTE — REASON FOR VISIT
[FreeTextEntry1] : 54F h/o HTN.  here to establish care. \par She takes 3 medications for HTN:  She was diagnosed with HTN at age 43.  Her blood pressure is 130-150.   Today in the office BP is 143.  \par \par Amlodipine 10mg daily\par Lisinopril 10mg daily\par Metoprolol 50 mg BID\par \par Reports a stress test at [facility] 2 years ago which she reports was normal. No echocardiogram.  \par Denies chest pressure.  no dyspnea on exertion.  + Leg swelling. + hyperpigmenation and edema on exam with strong pedasl pulses\par \par BP on repeat testing is 130/80\par \par No pshx\par \par Allx:  Pollen - she sometimes take claritin for this.  \par \par Family history:  HTN - monther, aunt, siblings.  \par \par Former light smoker - stopped 20+ years ago. \par \par   [Initial Evaluation] : an initial evaluation of

## 2019-05-15 NOTE — ASSESSMENT
[FreeTextEntry1] : Assessment\par 1.  HTN\par 2.  Edema\par 3.  Hyperpigmentation\par 4.  Reticular veins\par \par Plan\par 1.  Stop amlodipine -can cause swelling\par 2.  Start HCTZ  25 for mild diuretic effect\par 3.  Echo\par 4.  Compression stockings daily\par 5.  Return in 3-4 weeks (after echo) for labwork after starting HCTZ

## 2019-06-05 ENCOUNTER — APPOINTMENT (OUTPATIENT)
Dept: VASCULAR SURGERY | Facility: CLINIC | Age: 54
End: 2019-06-05
Payer: MEDICAID

## 2019-06-05 VITALS
HEIGHT: 66 IN | BODY MASS INDEX: 31.5 KG/M2 | DIASTOLIC BLOOD PRESSURE: 84 MMHG | TEMPERATURE: 98.5 F | WEIGHT: 196 LBS | HEART RATE: 71 BPM | SYSTOLIC BLOOD PRESSURE: 135 MMHG

## 2019-06-05 DIAGNOSIS — M79.89 OTHER SPECIFIED SOFT TISSUE DISORDERS: ICD-10-CM

## 2019-06-05 PROCEDURE — 93970 EXTREMITY STUDY: CPT

## 2019-06-05 PROCEDURE — 99204 OFFICE O/P NEW MOD 45 MIN: CPT

## 2019-06-05 NOTE — ASSESSMENT
[Other: _____] : [unfilled] [FreeTextEntry1] : 55 y/o f w/ c/o bilateral leg swelling and tingling. Venous Doppler today was negative. There are no venous findings to explain pts symptoms. \par \par Medical Conservative Management diet and weight loss, skin moisturizer, leg elevation prn and compression stockings\par f/u w/ pcp or cardiology to r/o other causes of edema\par She may return to the office on an as needed basis

## 2019-06-05 NOTE — PHYSICAL EXAM
[2+] : left 2+ [Alert] : alert [Oriented to Place] : oriented to place [Oriented to Person] : oriented to person [Oriented to Time] : oriented to time [Calm] : calm [Ankle Swelling (On Exam)] : not present [Varicose Veins Of Lower Extremities] : not present [] : not present [de-identified] : well  [FreeTextEntry1] : Mild scattered spider veins. No edema noted on exam today  [de-identified] : LAURENL [de-identified] : cooperative

## 2019-06-05 NOTE — DATA REVIEWED
[FreeTextEntry1] : 6/5/19 Venous Doppler: Right SFJ reflux only. No reflux in GSV, SSV or deep system. Left leg negative for reflux. No DVT/SVT.

## 2019-06-05 NOTE — HISTORY OF PRESENT ILLNESS
[FreeTextEntry1] : 55 y/o Romansh speaking f w/ history of bilateral leg swelling x 2 years associated w/ "dark spots" on her legs and tingling sensation. She reports the swelling is worse at the end of the day w/ prolonged standing, but states the tingling is worse at night. She is on a diuretic w/ slight improvement in swelling. She denies pain or claudication. Denies history of DVT or any vein procedures in the past.

## 2019-06-05 NOTE — REASON FOR VISIT
[Consultation] : a consultation visit [[employer] ] : provided by [employer]   [FreeTextEntry1] : leg swelling  [FreeTextEntry3] : form scanned into chart

## 2019-06-12 ENCOUNTER — APPOINTMENT (OUTPATIENT)
Dept: CARDIOLOGY | Facility: CLINIC | Age: 54
End: 2019-06-12
Payer: MEDICAID

## 2019-06-12 VITALS
OXYGEN SATURATION: 98 % | HEIGHT: 66 IN | DIASTOLIC BLOOD PRESSURE: 70 MMHG | TEMPERATURE: 98.3 F | HEART RATE: 67 BPM | WEIGHT: 196 LBS | SYSTOLIC BLOOD PRESSURE: 127 MMHG | BODY MASS INDEX: 31.5 KG/M2

## 2019-06-12 DIAGNOSIS — R60.0 LOCALIZED EDEMA: ICD-10-CM

## 2019-06-12 PROCEDURE — 99214 OFFICE O/P EST MOD 30 MIN: CPT

## 2019-06-12 NOTE — PHYSICAL EXAM
[General Appearance - Well Developed] : well developed [Normal Appearance] : normal appearance [No Deformities] : no deformities [Well Groomed] : well groomed [General Appearance - Well Nourished] : well nourished [General Appearance - In No Acute Distress] : no acute distress [Normal Conjunctiva] : the conjunctiva exhibited no abnormalities [Eyelids - No Xanthelasma] : the eyelids demonstrated no xanthelasmas [Normal Oral Mucosa] : normal oral mucosa [No Oral Pallor] : no oral pallor [No Oral Cyanosis] : no oral cyanosis [Normal Jugular Venous A Waves Present] : normal jugular venous A waves present [Normal Jugular Venous V Waves Present] : normal jugular venous V waves present [No Jugular Venous Zepeda A Waves] : no jugular venous zepeda A waves [Exaggerated Use Of Accessory Muscles For Inspiration] : no accessory muscle use [Respiration, Rhythm And Depth] : normal respiratory rhythm and effort [Auscultation Breath Sounds / Voice Sounds] : lungs were clear to auscultation bilaterally [Heart Sounds] : normal S1 and S2 [Heart Rate And Rhythm] : heart rate and rhythm were normal [Abdomen Tenderness] : non-tender [Murmurs] : no murmurs present [Abdomen Soft] : soft [Abnormal Walk] : normal gait [Abdomen Mass (___ Cm)] : no abdominal mass palpated [Gait - Sufficient For Exercise Testing] : the gait was sufficient for exercise testing [Nail Clubbing] : no clubbing of the fingernails [Petechial Hemorrhages (___cm)] : no petechial hemorrhages [Cyanosis, Localized] : no localized cyanosis [] : no ischemic changes [FreeTextEntry1] : no edema.    hyperpigmentation.  very midl reticular veins scattered (very mild).  strong pedal pulses.

## 2019-06-12 NOTE — ASSESSMENT
[FreeTextEntry1] : Assessment\par 1.  HTN\par 2.  Edema\par 3.  Hyperpigmentation\par 4.  Reticular veins\par \par Plan\par 1.  Continue with HCTZ  25\par 2.  Sleep study - snoring, HTN , daytime somnolence\par 3.  Echocardiogram\par 4.  Labwork today\par 5. Return in 3 months.

## 2019-06-12 NOTE — REASON FOR VISIT
[Follow-Up - Clinic] : a clinic follow-up of [FreeTextEntry1] : Followup visit 6/12/2019\par \par BP at home is 130 systolic.\par Legs feel better.  No pain.  Swelling is resolved\par BP in the office manually is 130/80\par No CP or sob.  admits to snoring at night.  No prior sleep study.  + daytime somnolence. \par \par Meds:\par HCTZ 25mg daily \par metoprolol 50mg daily \par Lisinopril 10mg daily \par \par Initial Visit\par 54F h/o HTN.  here to establish care. \par She takes 3 medications for HTN:  She was diagnosed with HTN at age 43.  Her blood pressure is 130-150.   Today in the office BP is 143.  \par \par Amlodipine 10mg daily\par Lisinopril 10mg daily\par Metoprolol 50 mg BID\par \par Reports a stress test at [facility] 2 years ago which she reports was normal. No echocardiogram.  \par Denies chest pressure.  no dyspnea on exertion.  + Leg swelling. + hyperpigmenation and edema on exam with strong pedasl pulses\par \par BP on repeat testing is 130/80\par \par No pshx\par \par Allx:  Pollen - she sometimes take claritin for this.  \par \par Family history:  HTN - monther, aunt, siblings.  \par \par Former light smoker - stopped 20+ years ago. \par \par

## 2019-06-13 LAB
ALBUMIN SERPL ELPH-MCNC: 4.6 G/DL
ALP BLD-CCNC: 59 U/L
ALT SERPL-CCNC: 30 U/L
ANION GAP SERPL CALC-SCNC: 13 MMOL/L
AST SERPL-CCNC: 23 U/L
BASOPHILS # BLD AUTO: 0.06 K/UL
BASOPHILS NFR BLD AUTO: 0.8 %
BILIRUB SERPL-MCNC: 0.3 MG/DL
BUN SERPL-MCNC: 14 MG/DL
CALCIUM SERPL-MCNC: 10 MG/DL
CHLORIDE SERPL-SCNC: 103 MMOL/L
CO2 SERPL-SCNC: 24 MMOL/L
CREAT SERPL-MCNC: 0.72 MG/DL
EOSINOPHIL # BLD AUTO: 0.37 K/UL
EOSINOPHIL NFR BLD AUTO: 5.1 %
GLUCOSE SERPL-MCNC: 116 MG/DL
HCT VFR BLD CALC: 41.6 %
HGB BLD-MCNC: 13.5 G/DL
IMM GRANULOCYTES NFR BLD AUTO: 0.3 %
LYMPHOCYTES # BLD AUTO: 2.41 K/UL
LYMPHOCYTES NFR BLD AUTO: 33.1 %
MAN DIFF?: NORMAL
MCHC RBC-ENTMCNC: 27.6 PG
MCHC RBC-ENTMCNC: 32.5 GM/DL
MCV RBC AUTO: 85.1 FL
MONOCYTES # BLD AUTO: 0.53 K/UL
MONOCYTES NFR BLD AUTO: 7.3 %
NEUTROPHILS # BLD AUTO: 3.88 K/UL
NEUTROPHILS NFR BLD AUTO: 53.4 %
PLATELET # BLD AUTO: 323 K/UL
POTASSIUM SERPL-SCNC: 4 MMOL/L
PROT SERPL-MCNC: 7.6 G/DL
RBC # BLD: 4.89 M/UL
RBC # FLD: 12.2 %
SODIUM SERPL-SCNC: 140 MMOL/L
WBC # FLD AUTO: 7.27 K/UL

## 2019-07-17 ENCOUNTER — APPOINTMENT (OUTPATIENT)
Dept: CV DIAGNOSITCS | Facility: HOSPITAL | Age: 54
End: 2019-07-17

## 2019-07-17 ENCOUNTER — OUTPATIENT (OUTPATIENT)
Dept: OUTPATIENT SERVICES | Facility: HOSPITAL | Age: 54
LOS: 1 days | End: 2019-07-17
Payer: MEDICAID

## 2019-07-17 DIAGNOSIS — R60.0 LOCALIZED EDEMA: ICD-10-CM

## 2019-07-17 PROCEDURE — 93306 TTE W/DOPPLER COMPLETE: CPT | Mod: 26

## 2019-07-17 PROCEDURE — 93306 TTE W/DOPPLER COMPLETE: CPT

## 2019-07-25 ENCOUNTER — OUTPATIENT (OUTPATIENT)
Dept: OUTPATIENT SERVICES | Facility: HOSPITAL | Age: 54
LOS: 1 days | End: 2019-07-25

## 2019-07-25 ENCOUNTER — APPOINTMENT (OUTPATIENT)
Dept: INTERNAL MEDICINE | Facility: CLINIC | Age: 54
End: 2019-07-25
Payer: MEDICAID

## 2019-07-25 VITALS — BODY MASS INDEX: 30.53 KG/M2 | WEIGHT: 190 LBS | HEART RATE: 68 BPM | OXYGEN SATURATION: 98 % | HEIGHT: 66 IN

## 2019-07-25 VITALS — DIASTOLIC BLOOD PRESSURE: 70 MMHG | SYSTOLIC BLOOD PRESSURE: 125 MMHG

## 2019-07-25 PROCEDURE — 99214 OFFICE O/P EST MOD 30 MIN: CPT | Mod: GC

## 2019-07-26 DIAGNOSIS — I10 ESSENTIAL (PRIMARY) HYPERTENSION: ICD-10-CM

## 2019-07-26 DIAGNOSIS — R73.03 PREDIABETES: ICD-10-CM

## 2019-07-26 DIAGNOSIS — R21 RASH AND OTHER NONSPECIFIC SKIN ERUPTION: ICD-10-CM

## 2019-07-26 DIAGNOSIS — E04.2 NONTOXIC MULTINODULAR GOITER: ICD-10-CM

## 2019-07-26 NOTE — REVIEW OF SYSTEMS
[Itching] : Itching [Skin Rash] : skin rash [Fever] : no fever [Night Sweats] : no night sweats [Recent Change In Weight] : ~T no recent weight change [Itching] : no itching [Nasal Discharge] : no nasal discharge [Sore Throat] : no sore throat [Chest Pain] : no chest pain [Lower Ext Edema] : no lower extremity edema [Shortness Of Breath] : no shortness of breath [Cough] : no cough [Abdominal Pain] : no abdominal pain [Nausea] : no nausea [Constipation] : no constipation [Diarrhea] : diarrhea [Vomiting] : no vomiting [Headache] : no headache [Dizziness] : no dizziness

## 2019-07-26 NOTE — END OF VISIT
[] : Resident [FreeTextEntry3] : Saw pt with resident \par c/o rash since 3 weeks started on trip to [place] - was staying with family in room motel , macular rash noted on arms b/l was not itching , returned after 2 weeks - on returning noticed rash on back of lower leg - hx ch venous stasis changes in lower legs better from before - rash became itching on and off throught day -no bite marks , no new cosmetic or soap as per pt , only new medication HCTZ started > 2 months ago \par exam as per above \par contact dermatitis vs infectious vs photosensitivity to HCTZ \par - advised to use moistures , Benadryl 50 qhs - call if rash spreads , becomes worse , use sunscreen

## 2019-07-26 NOTE — PHYSICAL EXAM
[No Acute Distress] : no acute distress [Well Nourished] : well nourished [Well Developed] : well developed [Normal Sclera/Conjunctiva] : normal sclera/conjunctiva [PERRL] : pupils equal round and reactive to light [EOMI] : extraocular movements intact [Normal Outer Ear/Nose] : the outer ears and nose were normal in appearance [Normal Oropharynx] : the oropharynx was normal [No JVD] : no jugular venous distention [No Lymphadenopathy] : no lymphadenopathy [Supple] : supple [Clear to Auscultation] : lungs were clear to auscultation bilaterally [Normal Rate] : normal rate  [Regular Rhythm] : with a regular rhythm [Normal S1, S2] : normal S1 and S2 [No Edema] : there was no peripheral edema [Soft] : abdomen soft [Non Tender] : non-tender [Non-distended] : non-distended [Normal Bowel Sounds] : normal bowel sounds [No Focal Deficits] : no focal deficits [Normal Affect] : the affect was normal [Alert and Oriented x3] : oriented to person, place, and time [de-identified] : Areas with flat slightly hyperpigmented spots on volar aspect of both arms. Similar rash present on upper calf near knee. Non-tender, not warm.

## 2019-07-26 NOTE — HISTORY OF PRESENT ILLNESS
[FreeTextEntry1] : Rash [de-identified] : Pt is a 53 y/o woman with a history of HTN, pre-DM, obesity, multinodular goiter and nephrolithiasis that is presenting for rash that has been present for 3 weeks. The patient states that her rash started about 3 weeks ago when she and her family traveled to [place] for vacation. She first noticed the rash on her arms after about 2 days of staying in a hotel. The rash itself was not painful or warm and was not itchy until 3 days ago after the patient returned home. The patient also noticed a similar rash on her right leg but only after she was prompted to look for additional rashes on her body. She denies any rashes anywhere else on her body. \par \par The patient denies any family members with similar symptoms, and she denies any hiking or camping in forested areas. She denies using any new soaps or lotions and states that she brought her own soap to the hotel. She denies trying any new foods or medications and has not taken or applied any medication for this current rash. She denies any fever, n/v, abdominal pain, CP, SOB, dysuria or changes in bowel habits.\par \par Of note the patient has had a chronic rash on both of her legs from the ankles up to the knees and is already being seen by dermatology and vascular surgery. Per her other providers, the previous rash is likely 2/2 venous stasis due to leg swelling which the patient had while she was taking amlodopine. She was switiched off amlodipine to HCTZ by her cardiologist a few months ago and since then her swelling has improved however the pigmented rash on her legs has not changed.

## 2019-07-26 NOTE — ASSESSMENT
[FreeTextEntry1] : 54F Maori-speaking with HTN, pre-DM (last hbA1c 6.2%), obesity, multinodular goiter, h/o nephrolithiasis presenting due to rash on arms and one leg, most likely 2/2 contact dermatitis vs medication photosensitivity.\par \par # Rash\par - Pt advised to avoid excess sunlight and use sunscreen when outdoors.\par - Pt instructed to use OTC benadryl and moisturizer to help with itching and speed rash resolution.\par - Pt instructed to contact clinic if rash worsens, spreads or fails to improve.\par \par # Multinodular Goiter\par -Nodules stable\par -TFTS from 1/9/19 wnl \par \par # HTN: Hypertension\par - medications recently changed by cardiology, amlodipine d/c'd due to leg swelling.\par - pt currently on HCTZ 25mg daily, metoprolol 50mg daily, and lisinopril 10mg daily\par - BP's now better controlled, c/w current regimen\par \par # Obesity\par - Pt advised on healthy diet and exercise. \par \par # Pre-diabetes\par - HgbA1c 6.2% on 4/25/19, will repeat at next visit in 3 months.\par - Pt advised on lifestyle modification as above\par \par # HCM\par - Mammogram 11/2018 birads 2, repeat on 11/2019\par - Cervical cancer screening 2/2018 negative\par - FIT test negative in 2017\par - Colonoscopy performed 4/2019\par - Tdap 2014\par - Influenza vaccine during next flu season.\par

## 2019-09-18 ENCOUNTER — APPOINTMENT (OUTPATIENT)
Dept: CARDIOLOGY | Facility: CLINIC | Age: 54
End: 2019-09-18
Payer: MEDICAID

## 2019-09-18 ENCOUNTER — NON-APPOINTMENT (OUTPATIENT)
Age: 54
End: 2019-09-18

## 2019-09-18 VITALS
DIASTOLIC BLOOD PRESSURE: 70 MMHG | HEART RATE: 58 BPM | HEIGHT: 66 IN | WEIGHT: 190 LBS | OXYGEN SATURATION: 98 % | BODY MASS INDEX: 30.53 KG/M2 | SYSTOLIC BLOOD PRESSURE: 120 MMHG

## 2019-09-18 PROCEDURE — 93000 ELECTROCARDIOGRAM COMPLETE: CPT

## 2019-09-18 PROCEDURE — 99214 OFFICE O/P EST MOD 30 MIN: CPT

## 2019-09-18 NOTE — PHYSICAL EXAM
[General Appearance - Well Developed] : well developed [Normal Appearance] : normal appearance [Well Groomed] : well groomed [No Deformities] : no deformities [General Appearance - Well Nourished] : well nourished [General Appearance - In No Acute Distress] : no acute distress [Normal Conjunctiva] : the conjunctiva exhibited no abnormalities [Eyelids - No Xanthelasma] : the eyelids demonstrated no xanthelasmas [Normal Oral Mucosa] : normal oral mucosa [No Oral Cyanosis] : no oral cyanosis [No Oral Pallor] : no oral pallor [Normal Jugular Venous V Waves Present] : normal jugular venous V waves present [Normal Jugular Venous A Waves Present] : normal jugular venous A waves present [No Jugular Venous Zepeda A Waves] : no jugular venous zepeda A waves [Exaggerated Use Of Accessory Muscles For Inspiration] : no accessory muscle use [Respiration, Rhythm And Depth] : normal respiratory rhythm and effort [Heart Rate And Rhythm] : heart rate and rhythm were normal [Auscultation Breath Sounds / Voice Sounds] : lungs were clear to auscultation bilaterally [Heart Sounds] : normal S1 and S2 [Murmurs] : no murmurs present [Abdomen Soft] : soft [Abdomen Tenderness] : non-tender [Abdomen Mass (___ Cm)] : no abdominal mass palpated [Abnormal Walk] : normal gait [Gait - Sufficient For Exercise Testing] : the gait was sufficient for exercise testing [Nail Clubbing] : no clubbing of the fingernails [Cyanosis, Localized] : no localized cyanosis [Petechial Hemorrhages (___cm)] : no petechial hemorrhages [] : no ischemic changes [FreeTextEntry1] : no edema.    hyperpigmentation.  very midl reticular veins scattered (very mild).  strong pedal pulses.

## 2019-09-18 NOTE — REASON FOR VISIT
[Follow-Up - Clinic] : a clinic follow-up of [FreeTextEntry1] :  9/18/2019\par Doing well\par no CP or SOB.\par No palptiations\par no leg swelling\par feels ok\par has some cramping at times, resolves with eating foods with potassium\par not exercising.

## 2019-09-18 NOTE — ASSESSMENT
[FreeTextEntry1] : Assessment\par 1.  HTN\par 2.  Edema\par 3.  Hyperpigmentation\par 4.  Reticular veins\par \par Plan\par 1.  Continue current meds- BP and HR well controlled\par 2.  Labs to assess electrolytes for cramping\par 3.  Return in 1 year.

## 2019-09-19 LAB
ALBUMIN SERPL ELPH-MCNC: 4.6 G/DL
ALP BLD-CCNC: 57 U/L
ALT SERPL-CCNC: 29 U/L
ANION GAP SERPL CALC-SCNC: 13 MMOL/L
AST SERPL-CCNC: 24 U/L
BASOPHILS # BLD AUTO: 0.07 K/UL
BASOPHILS NFR BLD AUTO: 0.9 %
BILIRUB SERPL-MCNC: 0.2 MG/DL
BUN SERPL-MCNC: 11 MG/DL
CALCIUM SERPL-MCNC: 9.7 MG/DL
CHLORIDE SERPL-SCNC: 105 MMOL/L
CO2 SERPL-SCNC: 24 MMOL/L
CREAT SERPL-MCNC: 0.61 MG/DL
EOSINOPHIL # BLD AUTO: 0.38 K/UL
EOSINOPHIL NFR BLD AUTO: 4.7 %
GLUCOSE SERPL-MCNC: 110 MG/DL
HCT VFR BLD CALC: 43.3 %
HGB BLD-MCNC: 13.5 G/DL
IMM GRANULOCYTES NFR BLD AUTO: 0.1 %
LYMPHOCYTES # BLD AUTO: 2.92 K/UL
LYMPHOCYTES NFR BLD AUTO: 36.5 %
MAN DIFF?: NORMAL
MCHC RBC-ENTMCNC: 27.6 PG
MCHC RBC-ENTMCNC: 31.2 GM/DL
MCV RBC AUTO: 88.5 FL
MONOCYTES # BLD AUTO: 0.61 K/UL
MONOCYTES NFR BLD AUTO: 7.6 %
NEUTROPHILS # BLD AUTO: 4.02 K/UL
NEUTROPHILS NFR BLD AUTO: 50.2 %
PLATELET # BLD AUTO: 341 K/UL
POTASSIUM SERPL-SCNC: 3.8 MMOL/L
PROT SERPL-MCNC: 7.4 G/DL
RBC # BLD: 4.89 M/UL
RBC # FLD: 12.5 %
SODIUM SERPL-SCNC: 142 MMOL/L
WBC # FLD AUTO: 8.01 K/UL

## 2019-10-28 ENCOUNTER — MESSAGE (OUTPATIENT)
Age: 54
End: 2019-10-28

## 2019-11-07 ENCOUNTER — OUTPATIENT (OUTPATIENT)
Dept: OUTPATIENT SERVICES | Facility: HOSPITAL | Age: 54
LOS: 1 days | End: 2019-11-07

## 2019-11-07 ENCOUNTER — APPOINTMENT (OUTPATIENT)
Dept: INTERNAL MEDICINE | Facility: CLINIC | Age: 54
End: 2019-11-07
Payer: MEDICAID

## 2019-11-07 ENCOUNTER — LABORATORY RESULT (OUTPATIENT)
Age: 54
End: 2019-11-07

## 2019-11-07 VITALS — BODY MASS INDEX: 31.82 KG/M2 | HEIGHT: 66 IN | WEIGHT: 198 LBS | HEART RATE: 78 BPM | OXYGEN SATURATION: 98 %

## 2019-11-07 VITALS — DIASTOLIC BLOOD PRESSURE: 84 MMHG | SYSTOLIC BLOOD PRESSURE: 138 MMHG

## 2019-11-07 LAB — HBA1C BLD-MCNC: 6.3 % — HIGH (ref 4–5.6)

## 2019-11-07 PROCEDURE — 99214 OFFICE O/P EST MOD 30 MIN: CPT | Mod: GC

## 2019-11-07 NOTE — PHYSICAL EXAM
[Well Nourished] : well nourished [No Acute Distress] : no acute distress [No Lymphadenopathy] : no lymphadenopathy [PERRL] : pupils equal round and reactive to light [Thyroid Normal, No Nodules] : the thyroid was normal and there were no nodules present [Supple] : supple [No Respiratory Distress] : no respiratory distress  [Clear to Auscultation] : lungs were clear to auscultation bilaterally [No Accessory Muscle Use] : no accessory muscle use [Normal Rate] : normal rate  [Normal S1, S2] : normal S1 and S2 [Regular Rhythm] : with a regular rhythm [No Murmur] : no murmur heard [No Edema] : there was no peripheral edema [Soft] : abdomen soft [Non Tender] : non-tender [Normal Bowel Sounds] : normal bowel sounds [Normal Posterior Cervical Nodes] : no posterior cervical lymphadenopathy [Normal Supraclavicular Nodes] : no supraclavicular lymphadenopathy [Normal Anterior Cervical Nodes] : no anterior cervical lymphadenopathy [No Rash] : no rash [Coordination Grossly Intact] : coordination grossly intact [Speech Grossly Normal] : speech grossly normal [Normal Mood] : the mood was normal [Normal] : affect was normal and insight and judgment were intact [Normal Insight/Judgement] : insight and judgment were intact

## 2019-11-08 DIAGNOSIS — E04.2 NONTOXIC MULTINODULAR GOITER: ICD-10-CM

## 2019-11-08 DIAGNOSIS — Z00.00 ENCOUNTER FOR GENERAL ADULT MEDICAL EXAMINATION WITHOUT ABNORMAL FINDINGS: ICD-10-CM

## 2019-11-08 DIAGNOSIS — R73.03 PREDIABETES: ICD-10-CM

## 2019-11-08 DIAGNOSIS — I10 ESSENTIAL (PRIMARY) HYPERTENSION: ICD-10-CM

## 2019-11-08 NOTE — ASSESSMENT
[FreeTextEntry1] : # Multinodular Goiter\par -Nodules stable\par -TFTS from 1/7/19 wnl \par \par # HTN: Hypertension\par - pt currently on HCTZ 25mg daily, metoprolol 50mg daily, and lisinopril 10mg daily\par - BP's now better controlled, c/w current regimen\par \par # Obesity\par - Pt advised on exercise (30 min a day 3x week)\par \par # Pre-diabetes\par - repeating HgbA1 today\par \par # HCM\par - Mammogram 11/2018 birads 2, repeat on 11/2020\par - Cervical cancer screening 2/2018 negative\par - FIT test today 11/7/2019\par - Tdap 2014\par - Influenza vaccine given today.

## 2019-11-08 NOTE — REVIEW OF SYSTEMS
[Negative] : Psychiatric [Fever] : no fever [Chills] : no chills [Fatigue] : no fatigue [Palpitations] : no palpitations [Night Sweats] : no night sweats [Chest Pain] : no chest pain [Lower Ext Edema] : no lower extremity edema [Shortness Of Breath] : no shortness of breath [Constipation] : no constipation [Abdominal Pain] : no abdominal pain [Itching] : no itching [Heartburn] : no heartburn [Suicidal] : not suicidal [Skin Rash] : no skin rash [Insomnia] : no insomnia [Anxiety] : no anxiety [Depression] : no depression

## 2019-11-08 NOTE — END OF VISIT
[] : Resident [FreeTextEntry3] : Rash resolved \par AIC 6.3 stable - diet and exercise \par Flu shot today \par FIT ordered - pt refused colonoscope \par \par

## 2019-11-12 ENCOUNTER — MESSAGE (OUTPATIENT)
Age: 54
End: 2019-11-12

## 2019-12-13 ENCOUNTER — OUTPATIENT (OUTPATIENT)
Dept: OUTPATIENT SERVICES | Facility: HOSPITAL | Age: 54
LOS: 1 days | End: 2019-12-13
Payer: MEDICAID

## 2019-12-13 ENCOUNTER — APPOINTMENT (OUTPATIENT)
Dept: MAMMOGRAPHY | Facility: IMAGING CENTER | Age: 54
End: 2019-12-13
Payer: MEDICAID

## 2019-12-13 DIAGNOSIS — Z00.8 ENCOUNTER FOR OTHER GENERAL EXAMINATION: ICD-10-CM

## 2019-12-13 PROCEDURE — 77067 SCR MAMMO BI INCL CAD: CPT

## 2019-12-13 PROCEDURE — 77063 BREAST TOMOSYNTHESIS BI: CPT | Mod: 26

## 2019-12-13 PROCEDURE — 77063 BREAST TOMOSYNTHESIS BI: CPT

## 2019-12-13 PROCEDURE — 77067 SCR MAMMO BI INCL CAD: CPT | Mod: 26

## 2020-01-27 ENCOUNTER — RX RENEWAL (OUTPATIENT)
Age: 55
End: 2020-01-27

## 2020-05-01 ENCOUNTER — APPOINTMENT (OUTPATIENT)
Dept: INTERNAL MEDICINE | Facility: CLINIC | Age: 55
End: 2020-05-01

## 2020-06-09 NOTE — HISTORY OF PRESENT ILLNESS
[FreeTextEntry1] : 6 mo f/u [de-identified] : Pt is a 55 y/o woman with a history of HTN, pre-DM, obesity, multinodular goiter and nephrolithiasis that is presenting for routine f/u of HTN.\par \par Last time patient had a skin rash which has nor resolved. Otherwise patient has been doing well. Her bP measurements at home run in 1teens over 80s. He is taking all his medications as prescribed. \par \par Patient is still wondering why she has discoloration in her lower extremities which was seen by cardiology and dermatology both. They have concluded that this was  venous stasis due to leg swelling caused by amlodipine. Since then she was switched off amlodipine to HCTZ by her cardiologist a few months ago and since then her swelling has improved however the pigmented rash on her legs has not changed.  4

## 2020-07-28 ENCOUNTER — LABORATORY RESULT (OUTPATIENT)
Age: 55
End: 2020-07-28

## 2020-07-28 ENCOUNTER — OUTPATIENT (OUTPATIENT)
Dept: OUTPATIENT SERVICES | Facility: HOSPITAL | Age: 55
LOS: 1 days | End: 2020-07-28

## 2020-07-28 ENCOUNTER — APPOINTMENT (OUTPATIENT)
Dept: INTERNAL MEDICINE | Facility: CLINIC | Age: 55
End: 2020-07-28
Payer: MEDICAID

## 2020-07-28 VITALS
BODY MASS INDEX: 31.18 KG/M2 | OXYGEN SATURATION: 98 % | RESPIRATION RATE: 16 BRPM | SYSTOLIC BLOOD PRESSURE: 124 MMHG | HEART RATE: 77 BPM | HEIGHT: 66 IN | WEIGHT: 194 LBS | DIASTOLIC BLOOD PRESSURE: 70 MMHG

## 2020-07-28 VITALS — DIASTOLIC BLOOD PRESSURE: 83 MMHG | SYSTOLIC BLOOD PRESSURE: 143 MMHG

## 2020-07-28 VITALS — TEMPERATURE: 97.3 F

## 2020-07-28 DIAGNOSIS — K29.00 ACUTE GASTRITIS W/OUT BLEEDING: ICD-10-CM

## 2020-07-28 LAB
ALBUMIN SERPL ELPH-MCNC: 4.5 G/DL — SIGNIFICANT CHANGE UP (ref 3.3–5)
ALP SERPL-CCNC: 50 U/L — SIGNIFICANT CHANGE UP (ref 40–120)
ALT FLD-CCNC: 29 U/L — SIGNIFICANT CHANGE UP (ref 4–33)
ANION GAP SERPL CALC-SCNC: 15 MMO/L — HIGH (ref 7–14)
AST SERPL-CCNC: 20 U/L — SIGNIFICANT CHANGE UP (ref 4–32)
BASOPHILS # BLD AUTO: 0.04 K/UL — SIGNIFICANT CHANGE UP (ref 0–0.2)
BASOPHILS NFR BLD AUTO: 0.5 % — SIGNIFICANT CHANGE UP (ref 0–2)
BILIRUB SERPL-MCNC: 0.2 MG/DL — SIGNIFICANT CHANGE UP (ref 0.2–1.2)
BUN SERPL-MCNC: 12 MG/DL — SIGNIFICANT CHANGE UP (ref 7–23)
CALCIUM SERPL-MCNC: 9.5 MG/DL — SIGNIFICANT CHANGE UP (ref 8.4–10.5)
CHLORIDE SERPL-SCNC: 103 MMOL/L — SIGNIFICANT CHANGE UP (ref 98–107)
CHOLEST SERPL-MCNC: 159 MG/DL — SIGNIFICANT CHANGE UP (ref 120–199)
CO2 SERPL-SCNC: 23 MMOL/L — SIGNIFICANT CHANGE UP (ref 22–31)
CREAT SERPL-MCNC: 0.61 MG/DL — SIGNIFICANT CHANGE UP (ref 0.5–1.3)
EOSINOPHIL # BLD AUTO: 0.17 K/UL — SIGNIFICANT CHANGE UP (ref 0–0.5)
EOSINOPHIL NFR BLD AUTO: 2.2 % — SIGNIFICANT CHANGE UP (ref 0–6)
GLUCOSE SERPL-MCNC: 203 MG/DL — HIGH (ref 70–99)
HBA1C BLD-MCNC: 6.5 % — HIGH (ref 4–5.6)
HCT VFR BLD CALC: 43.1 % — SIGNIFICANT CHANGE UP (ref 34.5–45)
HDLC SERPL-MCNC: 54 MG/DL — SIGNIFICANT CHANGE UP (ref 45–65)
HGB BLD-MCNC: 13.8 G/DL — SIGNIFICANT CHANGE UP (ref 11.5–15.5)
IMM GRANULOCYTES NFR BLD AUTO: 0.4 % — SIGNIFICANT CHANGE UP (ref 0–1.5)
LIPID PNL WITH DIRECT LDL SERPL: 90 MG/DL — SIGNIFICANT CHANGE UP
LYMPHOCYTES # BLD AUTO: 1.73 K/UL — SIGNIFICANT CHANGE UP (ref 1–3.3)
LYMPHOCYTES # BLD AUTO: 22.6 % — SIGNIFICANT CHANGE UP (ref 13–44)
MCHC RBC-ENTMCNC: 26.7 PG — LOW (ref 27–34)
MCHC RBC-ENTMCNC: 32 % — SIGNIFICANT CHANGE UP (ref 32–36)
MCV RBC AUTO: 83.5 FL — SIGNIFICANT CHANGE UP (ref 80–100)
MONOCYTES # BLD AUTO: 0.46 K/UL — SIGNIFICANT CHANGE UP (ref 0–0.9)
MONOCYTES NFR BLD AUTO: 6 % — SIGNIFICANT CHANGE UP (ref 2–14)
NEUTROPHILS # BLD AUTO: 5.24 K/UL — SIGNIFICANT CHANGE UP (ref 1.8–7.4)
NEUTROPHILS NFR BLD AUTO: 68.3 % — SIGNIFICANT CHANGE UP (ref 43–77)
NRBC # FLD: 0 K/UL — SIGNIFICANT CHANGE UP (ref 0–0)
PLATELET # BLD AUTO: 324 K/UL — SIGNIFICANT CHANGE UP (ref 150–400)
PMV BLD: 10.3 FL — SIGNIFICANT CHANGE UP (ref 7–13)
POTASSIUM SERPL-MCNC: 3.3 MMOL/L — LOW (ref 3.5–5.3)
POTASSIUM SERPL-SCNC: 3.3 MMOL/L — LOW (ref 3.5–5.3)
PROT SERPL-MCNC: 7.6 G/DL — SIGNIFICANT CHANGE UP (ref 6–8.3)
RBC # BLD: 5.16 M/UL — SIGNIFICANT CHANGE UP (ref 3.8–5.2)
RBC # FLD: 12.1 % — SIGNIFICANT CHANGE UP (ref 10.3–14.5)
SODIUM SERPL-SCNC: 141 MMOL/L — SIGNIFICANT CHANGE UP (ref 135–145)
T4 FREE SERPL-MCNC: 1.5 NG/DL — SIGNIFICANT CHANGE UP (ref 0.9–1.8)
TRIGL SERPL-MCNC: 127 MG/DL — SIGNIFICANT CHANGE UP (ref 10–149)
TSH SERPL-MCNC: 0.57 UIU/ML — SIGNIFICANT CHANGE UP (ref 0.27–4.2)
WBC # BLD: 7.67 K/UL — SIGNIFICANT CHANGE UP (ref 3.8–10.5)
WBC # FLD AUTO: 7.67 K/UL — SIGNIFICANT CHANGE UP (ref 3.8–10.5)

## 2020-07-28 PROCEDURE — 99213 OFFICE O/P EST LOW 20 MIN: CPT | Mod: GE

## 2020-07-28 NOTE — PHYSICAL EXAM
[No Acute Distress] : no acute distress [Well Nourished] : well nourished [Well Developed] : well developed [Well-Appearing] : well-appearing [Normal Sclera/Conjunctiva] : normal sclera/conjunctiva [PERRL] : pupils equal round and reactive to light [EOMI] : extraocular movements intact [Normal Outer Ear/Nose] : the outer ears and nose were normal in appearance [Normal Oropharynx] : the oropharynx was normal [No JVD] : no jugular venous distention [No Lymphadenopathy] : no lymphadenopathy [Supple] : supple [Thyroid Normal, No Nodules] : the thyroid was normal and there were no nodules present [No Respiratory Distress] : no respiratory distress  [Normal Rate] : normal rate  [Regular Rhythm] : with a regular rhythm [Normal S1, S2] : normal S1 and S2 [No Edema] : there was no peripheral edema [No Murmur] : no murmur heard [Non Tender] : non-tender [Soft] : abdomen soft [Non-distended] : non-distended [No CVA Tenderness] : no CVA  tenderness [No Joint Swelling] : no joint swelling [No HSM] : no HSM [Grossly Normal Strength/Tone] : grossly normal strength/tone [No Rash] : no rash [Coordination Grossly Intact] : coordination grossly intact [No Focal Deficits] : no focal deficits [Normal Affect] : the affect was normal [Normal Insight/Judgement] : insight and judgment were intact

## 2020-07-29 LAB — T3FREE SERPL-MCNC: 3.79 PG/ML — SIGNIFICANT CHANGE UP (ref 1.8–4.6)

## 2020-07-29 NOTE — HISTORY OF PRESENT ILLNESS
[[employer] ] : provided by [employer]   [FreeTextEntry1] : Medication follow up [de-identified] : Patient is 55F with HTN, pre-diabetes, obesity, multinodular goiter, and nephrolithiasis.  She presents for routine follow-up and medication refills.\par \par She has a new complaint of epigastric pain, which started 2 weeks ago and she notices it when she leans forward or bends over; she noticed it first when it felt like she pulled a muscle.  Has not used any pain medication for this.\par \par #HTN - compliant with home medications of HCTZ 25mg, metoprolol tartrate 50mg BID, and lisinopril 10mg daily.  Home BP measurements typically run in the 130s - 140s at home.  143/83 in office today.\par \par #pre-diabetes - not compliant with dietary changes although she believes she has lost ~3lbs since last visit.  Previous A1c 6.3%.  She is agreeable with taking metformin, depending on A1c results from today's testing.\par \par #gastritis - takes omeprazole intermittently for gastritis, would like refill\par \par #multinodular goiter - previous thyroid tests wnl

## 2020-07-29 NOTE — REVIEW OF SYSTEMS
[Fever] : no fever [Fatigue] : no fatigue [Discharge] : no discharge [Nosebleed] : no nosebleeds [Vision Problems] : no vision problems [Sore Throat] : no sore throat [Postnasal Drip] : no postnasal drip [Chest Pain] : no chest pain [Palpitations] : no palpitations [Lower Ext Edema] : no lower extremity edema [Wheezing] : no wheezing [Cough] : no cough [Shortness Of Breath] : no shortness of breath [Abdominal Pain] : no abdominal pain [Nausea] : no nausea [Dyspnea on Exertion] : no dyspnea on exertion [Constipation] : no constipation [Diarrhea] : diarrhea [Dysuria] : no dysuria [Vomiting] : no vomiting [Itching] : no itching [Headache] : no headache [Easy Bleeding] : no easy bleeding [Dizziness] : no dizziness

## 2020-07-29 NOTE — ASSESSMENT
[FreeTextEntry1] : HCM - FIT test prescribed (last done in 2017), mammogram ordered (last done 11/2019)

## 2020-07-29 NOTE — PLAN
[FreeTextEntry1] : Patient discussed with Dr. Banks.\par \par Plan for return visit in 6 months.  Patient may call for acute visit if epigastric pain does not resolve.  Will call with results of today's testing.

## 2020-07-30 DIAGNOSIS — I10 ESSENTIAL (PRIMARY) HYPERTENSION: ICD-10-CM

## 2020-07-30 DIAGNOSIS — E04.2 NONTOXIC MULTINODULAR GOITER: ICD-10-CM

## 2020-07-30 DIAGNOSIS — K29.00 ACUTE GASTRITIS WITHOUT BLEEDING: ICD-10-CM

## 2020-07-30 DIAGNOSIS — R10.13 EPIGASTRIC PAIN: ICD-10-CM

## 2020-07-30 DIAGNOSIS — R73.03 PREDIABETES: ICD-10-CM

## 2020-08-11 ENCOUNTER — OUTPATIENT (OUTPATIENT)
Dept: OUTPATIENT SERVICES | Facility: HOSPITAL | Age: 55
LOS: 1 days | End: 2020-08-11

## 2020-08-11 ENCOUNTER — APPOINTMENT (OUTPATIENT)
Dept: INTERNAL MEDICINE | Facility: CLINIC | Age: 55
End: 2020-08-11
Payer: MEDICAID

## 2020-08-11 ENCOUNTER — APPOINTMENT (OUTPATIENT)
Dept: INTERNAL MEDICINE | Facility: CLINIC | Age: 55
End: 2020-08-11

## 2020-08-11 VITALS
DIASTOLIC BLOOD PRESSURE: 80 MMHG | SYSTOLIC BLOOD PRESSURE: 120 MMHG | BODY MASS INDEX: 31.02 KG/M2 | OXYGEN SATURATION: 97 % | RESPIRATION RATE: 16 BRPM | HEIGHT: 66 IN | WEIGHT: 193 LBS | HEART RATE: 81 BPM

## 2020-08-11 DIAGNOSIS — E11.37X1 TYPE 2 DIABETES MELLITUS WITH DIABETIC MACULAR EDEMA, RESOLVED FOLLOWING TREATMENT, RIGHT EYE: ICD-10-CM

## 2020-08-11 DIAGNOSIS — R73.03 PREDIABETES.: ICD-10-CM

## 2020-08-11 DIAGNOSIS — R10.13 EPIGASTRIC PAIN: ICD-10-CM

## 2020-08-11 DIAGNOSIS — Z79.4 TYPE 2 DIABETES MELLITUS WITH DIABETIC MACULAR EDEMA, RESOLVED FOLLOWING TREATMENT, RIGHT EYE: ICD-10-CM

## 2020-08-11 PROCEDURE — 99213 OFFICE O/P EST LOW 20 MIN: CPT | Mod: GE

## 2020-08-12 DIAGNOSIS — E04.2 NONTOXIC MULTINODULAR GOITER: ICD-10-CM

## 2020-08-12 DIAGNOSIS — Z01.419 ENCOUNTER FOR GYNECOLOGICAL EXAMINATION (GENERAL) (ROUTINE) WITHOUT ABNORMAL FINDINGS: ICD-10-CM

## 2020-08-12 DIAGNOSIS — E66.9 OBESITY, UNSPECIFIED: ICD-10-CM

## 2020-08-12 DIAGNOSIS — I10 ESSENTIAL (PRIMARY) HYPERTENSION: ICD-10-CM

## 2020-08-12 DIAGNOSIS — R10.13 EPIGASTRIC PAIN: ICD-10-CM

## 2020-08-12 DIAGNOSIS — E11.9 TYPE 2 DIABETES MELLITUS WITHOUT COMPLICATIONS: ICD-10-CM

## 2020-08-12 NOTE — HISTORY OF PRESENT ILLNESS
[FreeTextEntry1] : Follow up for lab results [[employer] ] : provided by [employer]   [TWNoteComboBox1] : [place]ian [de-identified] : Patient is a 54 y/o Khmer speaking woman with HTN, DM, multinodular goiter and history of nephrolithiasis that is returning to clinic for follow up of her bloodwork that was performed on 7/28. Pt interviewed with : Rae, #[id number]  \par \par DM: Patient last seen in clinic on 7/28 where she had A1C drawn. Patient had previously been in pre-diabetic range. Most recent A1C returned at 6.5 and medication therapy discussed. Ms. Sepulveda states that she is willing to attempt therapy with Metformin at this time and will continue to attempt diet and exercise. \par \par Abd Pain: On previous visit, patient reported having left sided abdominal pain near her ribs. States that the pain is mostly unchanged at this time. Reports it as an ache that worsens with bending over forward. States that she believes she was pushing a heavy shopping cart at the time of onset but she is unsure. Denies any constipation, diarrhea, nausea or vomiting.\par \par HTN: Patient with BP well controlled on current medication regimen. BP in office today at 120/80.\par \par GERD: Patient previously reported that she takes her PPI intermittently. Does endorse occasional heartburn symptoms with meals.

## 2020-08-12 NOTE — PHYSICAL EXAM
[No Acute Distress] : no acute distress [Well Nourished] : well nourished [Normal Sclera/Conjunctiva] : normal sclera/conjunctiva [PERRL] : pupils equal round and reactive to light [Normal Outer Ear/Nose] : the outer ears and nose were normal in appearance [No JVD] : no jugular venous distention [Supple] : supple [No Respiratory Distress] : no respiratory distress  [No Accessory Muscle Use] : no accessory muscle use [Clear to Auscultation] : lungs were clear to auscultation bilaterally [Normal Rate] : normal rate  [Normal S1, S2] : normal S1 and S2 [Regular Rhythm] : with a regular rhythm [No Murmur] : no murmur heard [Soft] : abdomen soft [Non-distended] : non-distended [Non Tender] : non-tender [Normal Bowel Sounds] : normal bowel sounds [No Focal Deficits] : no focal deficits [No Rash] : no rash [Speech Grossly Normal] : speech grossly normal [Normal Affect] : the affect was normal [Normal Insight/Judgement] : insight and judgment were intact [de-identified] : Palpable left thyroid nodule, non tender [de-identified] : No erthema or rashes noted on skin

## 2020-08-12 NOTE — ASSESSMENT
[FreeTextEntry1] : 56 y/o F with HTN, multinodular goiter, and nephrolithiasis and new diagnosis of DM.\par \par #DM\par - Patient previously pre-diabetic range, now with most recent A1C now in diabetic range at 6.5\par - patient counseled to continue with diet and exercise\par - will refer to nutritionist for dietary counseling\par - will begin metformin 500mg qd today\par - plan for repeat A1C at next visit\par \par #Abdominal Pain\par - Pt with continued mild epigastric/LUQ pain\par - likely 2/2 muscle strain\par - Patient instructed to take tylenol and use warm compresses, encouraged to remain active\par - pt instructed to contact office if pain acutely worsens or if she notices abdominal swelling\par \par #HTN\par - in-office BP at 120/80 today\par - well controlled on current regimen\par - c/w Lisinopril 20mg qd, Metoprolol tartrate 50mg qd and HCTZ 25mg qd\par \par #GERD\par - pt with intermittent complaints of reflux symptoms\par - pt counseled on appropriate use of omeprazole, instructed to take PPI daily before breakfast.\par \par #Multinodular Goiter\par - Pt with known left thyroid lobe nodule, palpable on exam\par - previous US on 2/07/19 showing 6.1 x 3.8 x 4.8 cm complex nodule\par - previous TFTs WNL\par - will obtain US Thyroid to assess for change\par \par #HCM\par - Tdap/Flu UTD, pneumovax to be addressed at next visit\par - Pap: UTD\par - Mammo: ordered\par \par RTC in 3 months\par \par Case discussed with Dr. Banks

## 2020-08-12 NOTE — REVIEW OF SYSTEMS
[Abdominal Pain] : abdominal pain [Heartburn] : heartburn [Fever] : no fever [Chills] : no chills [Fatigue] : no fatigue [Recent Change In Weight] : ~T no recent weight change [Discharge] : no discharge [Pain] : no pain [Vision Problems] : no vision problems [Chest Pain] : no chest pain [Palpitations] : no palpitations [Lower Ext Edema] : no lower extremity edema [Cough] : no cough [Shortness Of Breath] : no shortness of breath [Wheezing] : no wheezing [Nausea] : no nausea [Constipation] : no constipation [Diarrhea] : diarrhea [Vomiting] : no vomiting [Dysmenorrhea] : no dysmenorrhea [Dysuria] : no dysuria [Hematuria] : no hematuria [Joint Pain] : no joint pain [Joint Stiffness] : no joint stiffness [Headache] : no headache [Dizziness] : no dizziness [Fainting] : no fainting

## 2020-09-16 ENCOUNTER — APPOINTMENT (OUTPATIENT)
Dept: INTERNAL MEDICINE | Facility: CLINIC | Age: 55
End: 2020-09-16

## 2020-09-22 ENCOUNTER — APPOINTMENT (OUTPATIENT)
Dept: ENDOCRINOLOGY | Facility: CLINIC | Age: 55
End: 2020-09-22

## 2020-10-30 ENCOUNTER — RX RENEWAL (OUTPATIENT)
Age: 55
End: 2020-10-30

## 2020-11-24 ENCOUNTER — APPOINTMENT (OUTPATIENT)
Dept: INTERNAL MEDICINE | Facility: CLINIC | Age: 55
End: 2020-11-24

## 2021-02-02 ENCOUNTER — APPOINTMENT (OUTPATIENT)
Dept: INTERNAL MEDICINE | Facility: CLINIC | Age: 56
End: 2021-02-02

## 2021-02-12 ENCOUNTER — APPOINTMENT (OUTPATIENT)
Dept: INTERNAL MEDICINE | Facility: CLINIC | Age: 56
End: 2021-02-12
Payer: MEDICAID

## 2021-02-12 ENCOUNTER — RESULT CHARGE (OUTPATIENT)
Age: 56
End: 2021-02-12

## 2021-02-12 ENCOUNTER — OUTPATIENT (OUTPATIENT)
Dept: OUTPATIENT SERVICES | Facility: HOSPITAL | Age: 56
LOS: 1 days | End: 2021-02-12

## 2021-02-12 VITALS — HEIGHT: 66 IN | BODY MASS INDEX: 31.66 KG/M2 | WEIGHT: 197 LBS

## 2021-02-12 VITALS — SYSTOLIC BLOOD PRESSURE: 122 MMHG | HEART RATE: 69 BPM | DIASTOLIC BLOOD PRESSURE: 80 MMHG | OXYGEN SATURATION: 98 %

## 2021-02-12 VITALS — TEMPERATURE: 95 F

## 2021-02-12 PROCEDURE — 99213 OFFICE O/P EST LOW 20 MIN: CPT | Mod: GE

## 2021-02-12 NOTE — PHYSICAL EXAM
[No Acute Distress] : no acute distress [Well Nourished] : well nourished [Well Developed] : well developed [Normal Sclera/Conjunctiva] : normal sclera/conjunctiva [EOMI] : extraocular movements intact [Supple] : supple [No Respiratory Distress] : no respiratory distress  [Clear to Auscultation] : lungs were clear to auscultation bilaterally [Normal Rate] : normal rate  [Regular Rhythm] : with a regular rhythm [Normal S1, S2] : normal S1 and S2 [No Murmur] : no murmur heard [Pedal Pulses Present] : the pedal pulses are present [No Edema] : there was no peripheral edema [Soft] : abdomen soft [Non Tender] : non-tender [Non-distended] : non-distended [No Masses] : no abdominal mass palpated [Normal Bowel Sounds] : normal bowel sounds [No Rash] : no rash [No Focal Deficits] : no focal deficits [Normal Affect] : the affect was normal [Normal Insight/Judgement] : insight and judgment were intact

## 2021-02-14 LAB — HBA1C MFR BLD HPLC: 6.6

## 2021-02-16 NOTE — HISTORY OF PRESENT ILLNESS
[FreeTextEntry1] : Routine f/u  [de-identified] : Patient is a 57 y/o Danish speaking woman with HTN, DM, GERD multinodular goiter and history of nephrolithiasis that is coming in for routine follow up of her chronic conditions and persistent abdominal pain. Pt interviewed with : Barbara, #[id number]\par \par DM: HbA1c 6.5% in 7/2020. She was started on Metformin 500mg QD at the last visit. She has not noticed a change in her weight since the last visit. She goes on walks infrequently and states her diet is about the same. \par \par Abd Pain: Pt has been c/o left-sided abdominal pain since 9/2020. Pt describes the pain as pressure-like, near her ribs, ache worsens with bending forward or sleeping on that side. Feels like she has a "balloon inside her abdomen", which moves to the left side as she moves. Denies any constipation, diarrhea, nausea or vomiting. Having a BM mildly relieves the pain. She's not sure if it's associated with eating. \par \par HTN: Patient with BP well controlled on current medication regimen (lisinopril 20mg qday + HCTZ 25mg qday + Lopressor 50 BID). BP in office today at 122/80. Patient checks her BP at home and it usually ranges within 120-130's/70-80's. Denies CP, palpitations, headaches. \par \par GERD: Patient takes PPI occasionally at home but does endorse occasional heartburn symptoms with meals. She has run out of the rx for a few weeks \par \par HCM: \par -Immunizations are UTD except PPSV-23. However, patient is interested in getting the COVID vaccine at this time. \par -MMG 12/2019 wnl. \par -Pap with HPV co-testing 2/2018 negative. \par -Patient states that she had a FIT test in 2019, which was negative (not in chart). \par -HIV/HCV no record

## 2021-02-16 NOTE — ASSESSMENT
[FreeTextEntry1] : Patient is a 57 y/o Welsh speaking woman with HTN, DM, GERD multinodular goiter and history of nephrolithiasis that is coming in for routine follow up of her chronic conditions and persistent abdominal pain. \par \par #T2DM: \par -POCT HbA1c 6.6% today\par -C/w metformin 500mg qday \par -Encouraged LSM (diet/exercise) \par \par #Abd Pain \par -Likely musculoskeletal or consistent with gas \par -C/w Tylenol for pain or can try simethicone\par -PE largely unrevealing \par -Will order abdominal US and consider GI consult if is clean \par -Encouraged pt to keep a diary of her pain and associated triggers\par \par #HTN - well controlled \par -C/w current meds lisinopril 20mg qday + HCTZ 25mg qday + Lopressor 50 BID\par -Will consider titrating lopressor at the next visit \par \par #GERD \par -C/w PPI \par -Consider GI referral in the future for endoscopy \par \par #HCM: \par -Immunizations are UTD except PPSV-23. However, patient is interested in getting the COVID vaccine at this time so will hold off on PPSV. \par -MMG 12/2019 wnl. Repeat ordered today \par -Pap with HPV co-testing 2/2018 negative. Repeat in 5 years. \par -Patient states that she had a FIT test in 2019, which was negative (not in chart). Repeat kit provided today. \par -HIV/HCV no record. Will collect with next labs \par \par RTC in 4 weeks with Dr. Ortega \par \par Case discussed with Dr. Quiros. \par \par Odalys Wakefield, PGY-1 \par [employer] [facility]

## 2021-02-16 NOTE — REVIEW OF SYSTEMS
[Abdominal Pain] : abdominal pain [Heartburn] : heartburn [Fever] : no fever [Chills] : no chills [Fatigue] : no fatigue [Night Sweats] : no night sweats [Recent Change In Weight] : ~T no recent weight change [Vision Problems] : no vision problems [Nasal Discharge] : no nasal discharge [Sore Throat] : no sore throat [Chest Pain] : no chest pain [Palpitations] : no palpitations [Leg Claudication] : no leg claudication [Lower Ext Edema] : no lower extremity edema [Wheezing] : no wheezing [Shortness Of Breath] : no shortness of breath [Cough] : no cough [Nausea] : no nausea [Constipation] : no constipation [Diarrhea] : diarrhea [Vomiting] : no vomiting [Dysuria] : no dysuria [Incontinence] : no incontinence [Joint Pain] : no joint pain [Skin Rash] : no skin rash [Muscle Pain] : no muscle pain [Dizziness] : no dizziness [Anxiety] : no anxiety [Depression] : no depression

## 2021-02-17 DIAGNOSIS — I10 ESSENTIAL (PRIMARY) HYPERTENSION: ICD-10-CM

## 2021-02-17 DIAGNOSIS — R10.9 UNSPECIFIED ABDOMINAL PAIN: ICD-10-CM

## 2021-02-17 DIAGNOSIS — Z00.00 ENCOUNTER FOR GENERAL ADULT MEDICAL EXAMINATION WITHOUT ABNORMAL FINDINGS: ICD-10-CM

## 2021-02-17 DIAGNOSIS — K21.9 GASTRO-ESOPHAGEAL REFLUX DISEASE WITHOUT ESOPHAGITIS: ICD-10-CM

## 2021-02-17 DIAGNOSIS — E11.9 TYPE 2 DIABETES MELLITUS WITHOUT COMPLICATIONS: ICD-10-CM

## 2021-02-18 ENCOUNTER — NON-APPOINTMENT (OUTPATIENT)
Age: 56
End: 2021-02-18

## 2021-03-01 ENCOUNTER — OUTPATIENT (OUTPATIENT)
Dept: OUTPATIENT SERVICES | Facility: HOSPITAL | Age: 56
LOS: 1 days | End: 2021-03-01
Payer: MEDICAID

## 2021-03-01 ENCOUNTER — APPOINTMENT (OUTPATIENT)
Dept: ULTRASOUND IMAGING | Facility: IMAGING CENTER | Age: 56
End: 2021-03-01
Payer: MEDICAID

## 2021-03-01 DIAGNOSIS — R10.9 UNSPECIFIED ABDOMINAL PAIN: ICD-10-CM

## 2021-03-01 DIAGNOSIS — Z00.8 ENCOUNTER FOR OTHER GENERAL EXAMINATION: ICD-10-CM

## 2021-03-01 PROCEDURE — 76700 US EXAM ABDOM COMPLETE: CPT | Mod: 26

## 2021-03-01 PROCEDURE — 76700 US EXAM ABDOM COMPLETE: CPT

## 2021-03-12 ENCOUNTER — APPOINTMENT (OUTPATIENT)
Dept: INTERNAL MEDICINE | Facility: CLINIC | Age: 56
End: 2021-03-12
Payer: MEDICAID

## 2021-03-12 ENCOUNTER — OUTPATIENT (OUTPATIENT)
Dept: OUTPATIENT SERVICES | Facility: HOSPITAL | Age: 56
LOS: 1 days | End: 2021-03-12

## 2021-03-12 VITALS
DIASTOLIC BLOOD PRESSURE: 70 MMHG | SYSTOLIC BLOOD PRESSURE: 118 MMHG | HEIGHT: 66 IN | BODY MASS INDEX: 31.66 KG/M2 | RESPIRATION RATE: 16 BRPM | OXYGEN SATURATION: 99 % | WEIGHT: 197 LBS | HEART RATE: 89 BPM

## 2021-03-12 DIAGNOSIS — E66.9 OBESITY, UNSPECIFIED: ICD-10-CM

## 2021-03-12 LAB
GLUCOSE BLDC GLUCOMTR-MCNC: 152
HEMOCCULT STL QL IA: NEGATIVE

## 2021-03-12 PROCEDURE — 99214 OFFICE O/P EST MOD 30 MIN: CPT | Mod: GC

## 2021-03-12 NOTE — PHYSICAL EXAM
[No Acute Distress] : no acute distress [Well Nourished] : well nourished [Well Developed] : well developed [Normal Sclera/Conjunctiva] : normal sclera/conjunctiva [PERRL] : pupils equal round and reactive to light [EOMI] : extraocular movements intact [Normal Outer Ear/Nose] : the outer ears and nose were normal in appearance [Normal Oropharynx] : the oropharynx was normal [No JVD] : no jugular venous distention [No Lymphadenopathy] : no lymphadenopathy [Supple] : supple [No Respiratory Distress] : no respiratory distress  [No Accessory Muscle Use] : no accessory muscle use [Clear to Auscultation] : lungs were clear to auscultation bilaterally [Normal Rate] : normal rate  [Regular Rhythm] : with a regular rhythm [Normal S1, S2] : normal S1 and S2 [No Murmur] : no murmur heard [No Edema] : there was no peripheral edema [Soft] : abdomen soft [Non Tender] : non-tender [Non-distended] : non-distended [No HSM] : no HSM [Normal Bowel Sounds] : normal bowel sounds [Normal Supraclavicular Nodes] : no supraclavicular lymphadenopathy [Normal Posterior Cervical Nodes] : no posterior cervical lymphadenopathy [Normal Anterior Cervical Nodes] : no anterior cervical lymphadenopathy [No CVA Tenderness] : no CVA  tenderness [No Spinal Tenderness] : no spinal tenderness [No Joint Swelling] : no joint swelling [Grossly Normal Strength/Tone] : grossly normal strength/tone [No Rash] : no rash [Coordination Grossly Intact] : coordination grossly intact [No Focal Deficits] : no focal deficits [Normal Gait] : normal gait [Speech Grossly Normal] : speech grossly normal [Alert and Oriented x3] : oriented to person, place, and time [Normal Mood] : the mood was normal [Normal Insight/Judgement] : insight and judgment were intact

## 2021-03-15 DIAGNOSIS — E66.9 OBESITY, UNSPECIFIED: ICD-10-CM

## 2021-03-15 DIAGNOSIS — E11.9 TYPE 2 DIABETES MELLITUS WITHOUT COMPLICATIONS: ICD-10-CM

## 2021-03-15 DIAGNOSIS — K76.0 FATTY (CHANGE OF) LIVER, NOT ELSEWHERE CLASSIFIED: ICD-10-CM

## 2021-03-15 DIAGNOSIS — R10.9 UNSPECIFIED ABDOMINAL PAIN: ICD-10-CM

## 2021-03-15 DIAGNOSIS — K21.9 GASTRO-ESOPHAGEAL REFLUX DISEASE WITHOUT ESOPHAGITIS: ICD-10-CM

## 2021-03-15 DIAGNOSIS — I10 ESSENTIAL (PRIMARY) HYPERTENSION: ICD-10-CM

## 2021-03-15 NOTE — ASSESSMENT
[FreeTextEntry1] : Patient is a 55 y/o Occitan speaking woman with HTN, DM, GERD multinodular goiter and history of nephrolithiasis that is coming in for routine follow up persistent abdominal pain. \par \par #Abd Pain \par -patient with left sided abdominal pain for several months\par -most likely MSK vs stomach bloating. Possible H. Pylori infection in setting of chronic GERD\par -abdominal u/s showing fatty liver and gallstones, no spleen or pancreas abnormalities\par -patient instructed to hold omeprazole for 2 weeks and given H Pylori stool antigen test kit\par -patient offered famotidine for now for symptom relief\par -prescription for simethicone also sent to pharmacy\par -will also attempt lidocaine patch due to concern for possible abdominal wall pain \par \par #Fatty Liver\par -patient with fatty liver and gallstones noted on recent abdominal u/s\par -encouraged increased diet and exercise and compliance with metformin\par \par #T2DM: \par -POCT HbA1c 6.6% in Feb 2021\par -C/w metformin 500mg BID\par -Reinforced LSM (diet/exercise)\par \par #HTN\par -C/w current meds lisinopril 20mg qday + HCTZ 25mg qday + Lopressor 50 BID\par -continue to monitor BPs at home, will consider titrating medications at next visit\par \par #GERD \par -switch PPI to H2 blocker for now prior to H pylori testing\par \par #HCM: \par -Immunizations are UTD except PPSV-23. However, patient is interested in getting the COVID vaccine at this time so will hold off on PPSV. \par -MMG 12/2019 wnl. Scheduled for next mammo next week\par -Pap with HPV co-testing 2/2018 negative. Scheduled Ob/gyn visit in 2 weeks\par -IFOBT from 3/1/21 negative\par -HIV/HCV no record. Will collect with next labs \par \par RTC in 10 weeks.\par \par Case discussed with Dr. Quiros\par \par MI, Firm [facility]

## 2021-03-15 NOTE — REVIEW OF SYSTEMS
[Abdominal Pain] : abdominal pain [Fever] : no fever [Chills] : no chills [Fatigue] : no fatigue [Discharge] : no discharge [Pain] : no pain [Vision Problems] : no vision problems [Earache] : no earache [Hearing Loss] : no hearing loss [Hoarseness] : no hoarseness [Sore Throat] : no sore throat [Chest Pain] : no chest pain [Palpitations] : no palpitations [Lower Ext Edema] : no lower extremity edema [Orthopnea] : no orthopnea [Shortness Of Breath] : no shortness of breath [Wheezing] : no wheezing [Cough] : no cough [Dyspnea on Exertion] : no dyspnea on exertion [Nausea] : no nausea [Constipation] : no constipation [Diarrhea] : diarrhea [Vomiting] : no vomiting [Melena] : no melena [Dysuria] : no dysuria [Incontinence] : no incontinence [Hematuria] : no hematuria [Frequency] : no frequency [Joint Pain] : no joint pain [Joint Stiffness] : no joint stiffness [Joint Swelling] : no joint swelling [Muscle Pain] : no muscle pain [Back Pain] : no back pain [Itching] : no itching [Skin Rash] : no skin rash [Headache] : no headache [Dizziness] : no dizziness [Fainting] : no fainting [Easy Bleeding] : no easy bleeding [Easy Bruising] : no easy bruising

## 2021-03-15 NOTE — HISTORY OF PRESENT ILLNESS
[ Service] : provided by  Service [FreeTextEntry1] : [id number] [FreeTextEntry2] : Randa [TWNoteComboBox1] : Swiss [de-identified] : Patient is a 57 y/o Belarusian speaking woman with HTN, DM, GERD multinodular goiter and history of nephrolithiasis that is coming in for routine follow up of her chronic conditions and persistent abdominal pain.\par \par Today, Ms Sepulveda continues to endorse the left sided abdominal pain that she has been having for the past few months. She states that the pain is a bloating/pinching sensation in the LUQ that ranges from a 6 to an 8 out of 10. She states that the pain is worse with movement and with bending forward. She endorses trying tylenol at home with minimal improvement in her symptoms. She states that she had her abdominal u/s performed recently. \par \par In terms of her chronic conditions, Ms. Sepulveda has been taking her metformin as prescribed. She has been taking her anti hypertensives as prescribed as well. She reports her home BPs have been around 120s/130s systolic. Otherwise she denies any fevers, cough, runny nose, dysphagia, hoarseness, CP, SOB, nausea, vomiting, diarrhea, constipation, melena, hematochezia, dysuria or hematuria. She denies any leg swelling or joint pain.

## 2021-03-15 NOTE — END OF VISIT
[] : Resident [FreeTextEntry3] : 56-year-old female presents for follow-up of abdominal pain.  Patient recently had abdominal ultrasound which showed hepatic steatosis and gallstones, but no spleen or pancreas abnormalities.  Patient endorses continued pain with some movements and feeling of bloating.  Will hold PPI and test for H. pylori, will give famotidine as needed now for symptom control.  We will also try lidocaine patch for possible abdominal wall pain.\par -Hypertension: Continue current medications\par -Diabetes: Continue with metformin twice daily

## 2021-03-19 ENCOUNTER — RESULT REVIEW (OUTPATIENT)
Age: 56
End: 2021-03-19

## 2021-03-19 ENCOUNTER — APPOINTMENT (OUTPATIENT)
Dept: MAMMOGRAPHY | Facility: IMAGING CENTER | Age: 56
End: 2021-03-19
Payer: MEDICAID

## 2021-03-19 ENCOUNTER — OUTPATIENT (OUTPATIENT)
Dept: OUTPATIENT SERVICES | Facility: HOSPITAL | Age: 56
LOS: 1 days | End: 2021-03-19
Payer: MEDICAID

## 2021-03-19 DIAGNOSIS — Z00.8 ENCOUNTER FOR OTHER GENERAL EXAMINATION: ICD-10-CM

## 2021-03-19 PROCEDURE — 77067 SCR MAMMO BI INCL CAD: CPT

## 2021-03-19 PROCEDURE — 77067 SCR MAMMO BI INCL CAD: CPT | Mod: 26

## 2021-03-19 PROCEDURE — 77063 BREAST TOMOSYNTHESIS BI: CPT

## 2021-03-19 PROCEDURE — 77063 BREAST TOMOSYNTHESIS BI: CPT | Mod: 26

## 2021-03-23 ENCOUNTER — RESULT REVIEW (OUTPATIENT)
Age: 56
End: 2021-03-23

## 2021-03-23 ENCOUNTER — NON-APPOINTMENT (OUTPATIENT)
Age: 56
End: 2021-03-23

## 2021-03-23 ENCOUNTER — APPOINTMENT (OUTPATIENT)
Dept: OBGYN | Facility: HOSPITAL | Age: 56
End: 2021-03-23
Payer: MEDICAID

## 2021-03-23 ENCOUNTER — OUTPATIENT (OUTPATIENT)
Dept: OUTPATIENT SERVICES | Facility: HOSPITAL | Age: 56
LOS: 1 days | End: 2021-03-23

## 2021-03-23 VITALS
BODY MASS INDEX: 31.34 KG/M2 | SYSTOLIC BLOOD PRESSURE: 150 MMHG | HEIGHT: 66 IN | HEART RATE: 68 BPM | WEIGHT: 195 LBS | TEMPERATURE: 98 F | DIASTOLIC BLOOD PRESSURE: 84 MMHG

## 2021-03-23 DIAGNOSIS — R10.9 UNSPECIFIED ABDOMINAL PAIN: ICD-10-CM

## 2021-03-23 LAB — HIV 1+2 AB+HIV1 P24 AG SERPL QL IA: SIGNIFICANT CHANGE UP

## 2021-03-23 PROCEDURE — 99396 PREV VISIT EST AGE 40-64: CPT | Mod: GC

## 2021-03-24 ENCOUNTER — RESULT REVIEW (OUTPATIENT)
Age: 56
End: 2021-03-24

## 2021-03-24 ENCOUNTER — APPOINTMENT (OUTPATIENT)
Dept: MAMMOGRAPHY | Facility: IMAGING CENTER | Age: 56
End: 2021-03-24
Payer: MEDICAID

## 2021-03-24 ENCOUNTER — OUTPATIENT (OUTPATIENT)
Dept: OUTPATIENT SERVICES | Facility: HOSPITAL | Age: 56
LOS: 1 days | End: 2021-03-24
Payer: MEDICAID

## 2021-03-24 ENCOUNTER — APPOINTMENT (OUTPATIENT)
Dept: ULTRASOUND IMAGING | Facility: IMAGING CENTER | Age: 56
End: 2021-03-24
Payer: MEDICAID

## 2021-03-24 DIAGNOSIS — Z00.8 ENCOUNTER FOR OTHER GENERAL EXAMINATION: ICD-10-CM

## 2021-03-24 LAB
C TRACH RRNA SPEC QL NAA+PROBE: SIGNIFICANT CHANGE UP
C TRACH+GC RRNA SPEC QL PROBE: SIGNIFICANT CHANGE UP
HBV SURFACE AG SER-ACNC: SIGNIFICANT CHANGE UP
HPV HIGH+LOW RISK DNA PNL CVX: SIGNIFICANT CHANGE UP
N GONORRHOEA RRNA SPEC QL NAA+PROBE: SIGNIFICANT CHANGE UP
T PALLIDUM AB TITR SER: NEGATIVE — SIGNIFICANT CHANGE UP

## 2021-03-24 PROCEDURE — 77065 DX MAMMO INCL CAD UNI: CPT | Mod: 26,RT

## 2021-03-24 PROCEDURE — 76642 ULTRASOUND BREAST LIMITED: CPT | Mod: 26,RT

## 2021-03-24 PROCEDURE — 76642 ULTRASOUND BREAST LIMITED: CPT

## 2021-03-24 PROCEDURE — 77065 DX MAMMO INCL CAD UNI: CPT

## 2021-03-25 ENCOUNTER — NON-APPOINTMENT (OUTPATIENT)
Age: 56
End: 2021-03-25

## 2021-03-25 ENCOUNTER — RESULT REVIEW (OUTPATIENT)
Age: 56
End: 2021-03-25

## 2021-03-25 PROBLEM — R10.9 ABDOMINAL PAIN: Status: ACTIVE | Noted: 2021-02-12

## 2021-03-25 LAB
CANDIDA AB TITR SER: SIGNIFICANT CHANGE UP
G VAGINALIS DNA SPEC QL NAA+PROBE: SIGNIFICANT CHANGE UP
HCV RNA SERPL NAA DL=5-ACNC: SIGNIFICANT CHANGE UP IU/ML
HCV RNA SPEC NAA+PROBE-LOG IU: SIGNIFICANT CHANGE UP LOG10IU/ML
T VAGINALIS SPEC QL WET PREP: SIGNIFICANT CHANGE UP

## 2021-03-26 ENCOUNTER — NON-APPOINTMENT (OUTPATIENT)
Age: 56
End: 2021-03-26

## 2021-03-26 LAB — CYTOLOGY SPEC DOC CYTO: SIGNIFICANT CHANGE UP

## 2021-03-29 DIAGNOSIS — Z00.00 ENCOUNTER FOR GENERAL ADULT MEDICAL EXAMINATION WITHOUT ABNORMAL FINDINGS: ICD-10-CM

## 2021-03-29 DIAGNOSIS — Z01.419 ENCOUNTER FOR GYNECOLOGICAL EXAMINATION (GENERAL) (ROUTINE) WITHOUT ABNORMAL FINDINGS: ICD-10-CM

## 2021-03-29 DIAGNOSIS — R10.9 UNSPECIFIED ABDOMINAL PAIN: ICD-10-CM

## 2021-03-30 ENCOUNTER — APPOINTMENT (OUTPATIENT)
Dept: OPHTHALMOLOGY | Facility: CLINIC | Age: 56
End: 2021-03-30
Payer: MEDICAID

## 2021-03-30 ENCOUNTER — NON-APPOINTMENT (OUTPATIENT)
Age: 56
End: 2021-03-30

## 2021-03-30 LAB — H PYLORI AG STL QL: DETECTED

## 2021-03-30 PROCEDURE — 92004 COMPRE OPH EXAM NEW PT 1/>: CPT

## 2021-03-30 PROCEDURE — 99072 ADDL SUPL MATRL&STAF TM PHE: CPT

## 2021-03-30 PROCEDURE — 92015 DETERMINE REFRACTIVE STATE: CPT

## 2021-03-31 ENCOUNTER — APPOINTMENT (OUTPATIENT)
Dept: ULTRASOUND IMAGING | Facility: IMAGING CENTER | Age: 56
End: 2021-03-31
Payer: MEDICAID

## 2021-03-31 ENCOUNTER — RESULT REVIEW (OUTPATIENT)
Age: 56
End: 2021-03-31

## 2021-03-31 ENCOUNTER — OUTPATIENT (OUTPATIENT)
Dept: OUTPATIENT SERVICES | Facility: HOSPITAL | Age: 56
LOS: 1 days | End: 2021-03-31
Payer: MEDICAID

## 2021-03-31 DIAGNOSIS — N63.10 UNSPECIFIED LUMP IN THE RIGHT BREAST, UNSPECIFIED QUADRANT: ICD-10-CM

## 2021-03-31 DIAGNOSIS — Z00.8 ENCOUNTER FOR OTHER GENERAL EXAMINATION: ICD-10-CM

## 2021-03-31 PROCEDURE — 88305 TISSUE EXAM BY PATHOLOGIST: CPT

## 2021-03-31 PROCEDURE — 77065 DX MAMMO INCL CAD UNI: CPT

## 2021-03-31 PROCEDURE — 19083 BX BREAST 1ST LESION US IMAG: CPT

## 2021-03-31 PROCEDURE — 88305 TISSUE EXAM BY PATHOLOGIST: CPT | Mod: 26

## 2021-03-31 PROCEDURE — 88360 TUMOR IMMUNOHISTOCHEM/MANUAL: CPT | Mod: 26

## 2021-03-31 PROCEDURE — 19083 BX BREAST 1ST LESION US IMAG: CPT | Mod: RT

## 2021-03-31 PROCEDURE — 88360 TUMOR IMMUNOHISTOCHEM/MANUAL: CPT

## 2021-03-31 PROCEDURE — 77065 DX MAMMO INCL CAD UNI: CPT | Mod: 26,RT

## 2021-04-14 ENCOUNTER — APPOINTMENT (OUTPATIENT)
Dept: INTERNAL MEDICINE | Facility: CLINIC | Age: 56
End: 2021-04-14
Payer: MEDICAID

## 2021-04-14 ENCOUNTER — OUTPATIENT (OUTPATIENT)
Dept: OUTPATIENT SERVICES | Facility: HOSPITAL | Age: 56
LOS: 1 days | End: 2021-04-14

## 2021-04-14 VITALS
SYSTOLIC BLOOD PRESSURE: 138 MMHG | BODY MASS INDEX: 31.18 KG/M2 | DIASTOLIC BLOOD PRESSURE: 62 MMHG | HEIGHT: 66 IN | RESPIRATION RATE: 19 BRPM | HEART RATE: 88 BPM | OXYGEN SATURATION: 99 % | WEIGHT: 194 LBS

## 2021-04-14 VITALS — TEMPERATURE: 97.3 F

## 2021-04-14 DIAGNOSIS — K76.0 FATTY (CHANGE OF) LIVER, NOT ELSEWHERE CLASSIFIED: ICD-10-CM

## 2021-04-14 DIAGNOSIS — A04.8 OTHER SPECIFIED BACTERIAL INTESTINAL INFECTIONS: ICD-10-CM

## 2021-04-14 LAB — GLUCOSE BLDC GLUCOMTR-MCNC: 105

## 2021-04-14 PROCEDURE — 99214 OFFICE O/P EST MOD 30 MIN: CPT | Mod: GC

## 2021-04-14 RX ORDER — FAMOTIDINE 20 MG/1
20 TABLET, FILM COATED ORAL DAILY
Qty: 30 | Refills: 1 | Status: DISCONTINUED | COMMUNITY
Start: 2021-03-12 | End: 2021-04-14

## 2021-04-14 NOTE — PHYSICAL EXAM
[No Acute Distress] : no acute distress [Well Nourished] : well nourished [Well Developed] : well developed [Well-Appearing] : well-appearing [Normal Sclera/Conjunctiva] : normal sclera/conjunctiva [EOMI] : extraocular movements intact [Normal Outer Ear/Nose] : the outer ears and nose were normal in appearance [Normal Oropharynx] : the oropharynx was normal [No JVD] : no jugular venous distention [No Lymphadenopathy] : no lymphadenopathy [Supple] : supple [No Respiratory Distress] : no respiratory distress  [No Accessory Muscle Use] : no accessory muscle use [Clear to Auscultation] : lungs were clear to auscultation bilaterally [Normal Rate] : normal rate  [Regular Rhythm] : with a regular rhythm [Normal S1, S2] : normal S1 and S2 [No Murmur] : no murmur heard [No Edema] : there was no peripheral edema [Soft] : abdomen soft [Non Tender] : non-tender [Non-distended] : non-distended [No HSM] : no HSM [Normal Bowel Sounds] : normal bowel sounds [Normal Posterior Cervical Nodes] : no posterior cervical lymphadenopathy [Normal Anterior Cervical Nodes] : no anterior cervical lymphadenopathy [No CVA Tenderness] : no CVA  tenderness [No Spinal Tenderness] : no spinal tenderness [No Joint Swelling] : no joint swelling [Grossly Normal Strength/Tone] : grossly normal strength/tone [No Rash] : no rash [Coordination Grossly Intact] : coordination grossly intact [No Focal Deficits] : no focal deficits [Normal Gait] : normal gait [Normal Affect] : the affect was normal [Normal Insight/Judgement] : insight and judgment were intact [No Axillary Lymphadenopathy] : no axillary lymphadenopathy

## 2021-04-15 DIAGNOSIS — N63.10 UNSPECIFIED LUMP IN THE RIGHT BREAST, UNSPECIFIED QUADRANT: ICD-10-CM

## 2021-04-15 DIAGNOSIS — A04.8 OTHER SPECIFIED BACTERIAL INTESTINAL INFECTIONS: ICD-10-CM

## 2021-04-15 DIAGNOSIS — I10 ESSENTIAL (PRIMARY) HYPERTENSION: ICD-10-CM

## 2021-04-15 DIAGNOSIS — E11.9 TYPE 2 DIABETES MELLITUS WITHOUT COMPLICATIONS: ICD-10-CM

## 2021-04-15 DIAGNOSIS — K76.0 FATTY (CHANGE OF) LIVER, NOT ELSEWHERE CLASSIFIED: ICD-10-CM

## 2021-04-15 DIAGNOSIS — K21.9 GASTRO-ESOPHAGEAL REFLUX DISEASE WITHOUT ESOPHAGITIS: ICD-10-CM

## 2021-04-15 NOTE — HISTORY OF PRESENT ILLNESS
[FreeTextEntry1] : Follow up [de-identified] : Patient is a 55 y/o Romanian speaking woman with HTN, DM, GERD multinodular goiter and history of nephrolithiasis that is coming in for routine follow up of her chronic conditions. Patient recently was noted to have a right breast mass that was detected on routine mammogram. Mass was noted to be BIRADS-5, with high suspicion for malignancy. Patient underwent a breast US with biopsy recently with pathology showing invasive moderately differentiated ductal carcinoma. She is currently scheduled for a breast MRI and has an appointment with a breast surgeon next week.\par \par Today, Ms. Sepulveda continues to endorse the same left sided abdominal bloating/pinching sensation she had at her previous visit. She states that this discomfort is unchanged from previous and doesn’t seem to be closely associated with any particular foods. In terms of her breast mass, Ms. Sepulveda was tearful when discussing the matter. She states that her family has been very supportive and that she can rely on them. She denies any current breast discomfort, swelling or discharge. \par \par In terms of her chronic conditions, Ms. Sepulveda has been compliant with her home HTN and DM medications. She denies any other complaints such as fever, dizziness, nausea/vomiting, diarrhea, CP or SOB. \par

## 2021-04-15 NOTE — REVIEW OF SYSTEMS
[Abdominal Pain] : abdominal pain [Fever] : no fever [Chills] : no chills [Fatigue] : no fatigue [Discharge] : no discharge [Pain] : no pain [Vision Problems] : no vision problems [Itching] : no itching [Earache] : no earache [Hearing Loss] : no hearing loss [Nasal Discharge] : no nasal discharge [Sore Throat] : no sore throat [Chest Pain] : no chest pain [Palpitations] : no palpitations [Lower Ext Edema] : no lower extremity edema [Orthopnea] : no orthopnea [Shortness Of Breath] : no shortness of breath [Wheezing] : no wheezing [Cough] : no cough [Dyspnea on Exertion] : no dyspnea on exertion [Nausea] : no nausea [Constipation] : no constipation [Vomiting] : no vomiting [Diarrhea] : diarrhea [Heartburn] : no heartburn [Dysuria] : no dysuria [Incontinence] : no incontinence [Hematuria] : no hematuria [Frequency] : no frequency [Joint Pain] : no joint pain [Muscle Pain] : no muscle pain [Back Pain] : no back pain [Itching] : no itching [Skin Rash] : no skin rash [Headache] : no headache [Memory Loss] : no memory loss

## 2021-04-15 NOTE — ASSESSMENT
[FreeTextEntry1] : Patient is a 55 y/o Ugandan speaking woman with HTN, DM, GERD multinodular goiter and history of nephrolithiasis that is coming in for routine follow up persistent abdominal pain. \par \par #Right Breast Mass\par - Patient with right breast mass noted during screening mammo on 3/2021\par - mass noted to be BIRADS-5, high suspicion for malignancy\par - breast ultrasound with biopsy performed and pathology showed invasive moderately diffentiated ductal carcinoma\par - patient planned for breast MRI on 4/16/21\par - scheduled for appointment with breast surgeon on 4/21/21\par \par #Abd Pain/H Pylori \par -patient with left sided abdominal pain for several months\par -most likely MSK vs stomach bloating. Possible H. Pylori infection in setting of chronic GERD\par -abdominal u/s showing fatty liver and gallstones, no spleen or pancreas abnormalities\par -h pylori stool antigen positive\par -will initially triple therapy today\par -Amoxicillin 1g BID, Clarithromycin 500mg BID and Omeprazole 40mg BID all sent to patient's pharmacy\par \par #Fatty Liver\par -patient with fatty liver and gallstones noted on recent abdominal u/s\par -encouraged increased diet and exercise and compliance with metformin\par \par #T2DM\par -POCT HbA1c 6.6% in Feb 2021\par -C/w metformin 500mg BID\par -Reinforced LSM (diet/exercise)\par -will reassess A1C at next visit\par \par #HTN\par -C/w current meds lisinopril 20mg qday + HCTZ 25mg qday + Lopressor 50 BID\par -Pt previously noted to be hypokalemic with HCTZ therapy\par -encouraged taking doses with food/orange juice\par -will also add on potassium chloride supplementation\par -continue to monitor BPs at home, will consider titrating medications at next visit\par \par #HCM: \par -Immunizations are UTD except PPSV-23\par -patient in the process of receiving her COVID vaccine (pending 2nd dose), will hold off on other vaccinations for now\par -MMG 12/2019 wnl. Mammo 3/24/21 showing BIRADS 5 right breast mass\par -Pap with HPV co-testing 3/2021 negative\par -IFOBT from 3/1/21 negative\par -HIV negative 3/2021\par \par RTC in 3 months or sooner if needed for pre-op clearance\par \par Case discussed with Dr. Gill\par \par MI, [facility] 2

## 2021-04-16 ENCOUNTER — OUTPATIENT (OUTPATIENT)
Dept: OUTPATIENT SERVICES | Facility: HOSPITAL | Age: 56
LOS: 1 days | End: 2021-04-16
Payer: MEDICAID

## 2021-04-16 ENCOUNTER — APPOINTMENT (OUTPATIENT)
Dept: MRI IMAGING | Facility: IMAGING CENTER | Age: 56
End: 2021-04-16
Payer: MEDICAID

## 2021-04-16 DIAGNOSIS — Z00.8 ENCOUNTER FOR OTHER GENERAL EXAMINATION: ICD-10-CM

## 2021-04-16 DIAGNOSIS — C50.911 MALIGNANT NEOPLASM OF UNSPECIFIED SITE OF RIGHT FEMALE BREAST: ICD-10-CM

## 2021-04-16 PROCEDURE — A9585: CPT

## 2021-04-16 PROCEDURE — C8908: CPT

## 2021-04-16 PROCEDURE — 77049 MRI BREAST C-+ W/CAD BI: CPT | Mod: 26

## 2021-04-16 PROCEDURE — C8937: CPT

## 2021-04-20 ENCOUNTER — APPOINTMENT (OUTPATIENT)
Dept: OBGYN | Facility: HOSPITAL | Age: 56
End: 2021-04-20

## 2021-04-21 ENCOUNTER — APPOINTMENT (OUTPATIENT)
Dept: SURGICAL ONCOLOGY | Facility: CLINIC | Age: 56
End: 2021-04-21
Payer: MEDICAID

## 2021-04-21 ENCOUNTER — TRANSCRIPTION ENCOUNTER (OUTPATIENT)
Age: 56
End: 2021-04-21

## 2021-04-21 VITALS
DIASTOLIC BLOOD PRESSURE: 80 MMHG | BODY MASS INDEX: 31.18 KG/M2 | RESPIRATION RATE: 16 BRPM | HEIGHT: 66 IN | TEMPERATURE: 97.6 F | HEART RATE: 75 BPM | OXYGEN SATURATION: 97 % | WEIGHT: 194 LBS | SYSTOLIC BLOOD PRESSURE: 140 MMHG

## 2021-04-21 DIAGNOSIS — Z86.69 PERSONAL HISTORY OF OTHER DISEASES OF THE NERVOUS SYSTEM AND SENSE ORGANS: ICD-10-CM

## 2021-04-21 DIAGNOSIS — Z86.39 PERSONAL HISTORY OF OTHER ENDOCRINE, NUTRITIONAL AND METABOLIC DISEASE: ICD-10-CM

## 2021-04-21 DIAGNOSIS — Z87.898 PERSONAL HISTORY OF OTHER SPECIFIED CONDITIONS: ICD-10-CM

## 2021-04-21 DIAGNOSIS — Z87.891 PERSONAL HISTORY OF NICOTINE DEPENDENCE: ICD-10-CM

## 2021-04-21 DIAGNOSIS — Z80.0 FAMILY HISTORY OF MALIGNANT NEOPLASM OF DIGESTIVE ORGANS: ICD-10-CM

## 2021-04-21 DIAGNOSIS — Z80.6 FAMILY HISTORY OF LEUKEMIA: ICD-10-CM

## 2021-04-21 PROCEDURE — 99205 OFFICE O/P NEW HI 60 MIN: CPT

## 2021-04-21 PROCEDURE — 99072 ADDL SUPL MATRL&STAF TM PHE: CPT

## 2021-04-21 NOTE — PAST MEDICAL HISTORY
[Postmenopausal] : The patient is postmenopausal [Menarche Age ____] : age at menarche was [unfilled] [Menopause Age____] : age at menopause was [unfilled] [History of Hormone Replacement Treatment] : has no history of hormone replacement treatment [Total Preg ___] : G[unfilled] [Live Births ___] : P[unfilled]  [Age At Live Birth ___] : Age at live birth: [unfilled] [FreeTextEntry6] : none [FreeTextEntry7] : none [FreeTextEntry8] : 2 years

## 2021-04-21 NOTE — ASSESSMENT
[FreeTextEntry1] : The patient is a 56 year old female with R breast IDC, G2, ER/[place] positive, HER2 negative.\par \par We discussed her situation at length in the office today with the patient and her son. First we discussed her histopathology and biomarkers in terms of prognosis and adjuvant therapy. We discussed surgical options including mastectomy versus lumpectomy. She understands that she would not get a better outcome or longer survival with the mastectomy, although risk of local recurrence is lower. After breast conserving surgery, she may see asymmetry in size. \par \par She understands that radiation therapy and postoperative evaluation by Medical Oncology are integral parts of breast-conserving treatment and these will be addressed postoperatively. If we proceed with mastectomy, there is a still a chance that radiation will be recommended but less likely.\par  \par If we proceed with lumpectomy, Rosio understands that it will be important to obtain clear margins and there is a 5-10% risk of having to go back for additional tissue. With mastectomy, there is still a small amount of breast tissue (probably on the order of 5%) that remains which will continue to require yearly clinical examination.\par  \par The risks of surgery include bleeding, infection, scarring, numbness, possible discrepancy in breast size with lumpectomy/reconstructed breast.  At the time of lumpectomy, a pre-operative localization of the lesion is required.\par  \par An MRI has already been done and does not show multicentric or contralateral disease.    \par \par We discussed the role of sentinel node biopsy. This will confirm the stage and extent of disease. The risk of lymphedema is 5% with a sentinel lymph node biopsy and 15% with an axillary lymph node dissection.\par  \par Preoperative, intraoperative, and postoperative considerations were reviewed including anesthetic management and what she can expect when she is recovering from surgery. Risks, benefits, alternatives, and various management options were discussed with the patient.\par \par Following our discussion, we have decided to proceed with a R breast lumpectomy with SAIRA localization and SLNB.\par \par She is also interested in genetic testing today given family history of pancreatic cancer and personal history of breast cancer.\par   \par Rosio verbalizes her understanding of the procedure and the risks/benefits/complications and alternatives were discussed questions were answered. Rosio knows to call me if she has any additional questions or concerns.\par \par Plan:\par  - f/u GT \par  - medical clearance\par  - SAIRA \par  - Sx booking: R breast lumpectomy with SAIRA localization and SLNB\par

## 2021-04-21 NOTE — HISTORY OF PRESENT ILLNESS
[FreeTextEntry1] : The patient is a 56 year old female referred for consultation by Dr. Machelle Ortega for R breast IDC. She presents with her son Lisa who assisted with interpretation (pt refused  services).\par \par Prior imaging:\par 3/19/2021 B/L SM (NW) revealed scattered fibroglandular densities. TC 18.3%\par  - L upper breast ribbon clip\par  - R outer/upper outer breast 2.5 cm mass, N5\par  - R lateral to mass 6 mm cluster of calcs\par \par 3/24/2021 R DM \par  - R upper outer breaset 1.4 x 1.6 cm with heterogeneous microcalcifications\par \par 3/24/2021 R US\par  - R 10:00 N5 1.5 x 1 x 0.9 cm irregular hypoechoic mass -> BR5\par  - R ax LN wnl\par \par 3/31/2021 USG-[facility]B\par  - R 10:00 N5: invasive moderately diff ductal carcinoma, DCIS, intermediate grade, focal. ER >98%, NV >98%, HER2 0\par \par 4/16/2021 MRI:\par  - R 1.9 x 2.6 x 2.0 cm mass with bx clip.\par  - L breast neg\par  - No axillary or internal mammary adenopathy\par  - L thyroid lobe nodule\par \par Today, the patient reports no breast complaints. Denies pain, masses, skin changes, or nipple discharge.

## 2021-04-21 NOTE — PHYSICAL EXAM
[Normocephalic] : normocephalic [Atraumatic] : atraumatic [EOMI] : extra ocular movement intact [PERRL] : pupils equal, round and reactive to light [Sclera nonicteric] : sclera nonicteric [Supple] : supple [No Supraclavicular Adenopathy] : no supraclavicular adenopathy [Examined in the supine and seated position] : examined in the supine and seated position [No Cervical Adenopathy] : no cervical adenopathy [Asymmetrical] : asymmetrical [Bra Size: ___] : Bra Size: [unfilled] [Grade 1] : Ptosis Grade 1 [No dominant masses] : no dominant masses in right breast  [No dominant masses] : no dominant masses left breast [Breast Mass Right Breast ___cm] : no masses [Breast Mass Left Breast ___cm] : no masses [Breast Nipple Inversion] : nipples not inverted [Breast Nipple Flattening] : nipples not flattened [Breast Nipple Retraction] : nipples not retracted [Breast Nipple Fissures] : nipples not fissured [Breast Abnormal Lactation (Galactorrhea)] : no galactorrhea [Breast Abnormal Secretion Bloody Fluid] : no bloody discharge [Breast Abnormal Secretion Serous Fluid] : no serous discharge [Breast Abnormal Secretion Opalescent Fluid] : no milky discharge [No Axillary Lymphadenopathy] : no left axillary lymphadenopathy [No Edema] : no edema [No Swelling] : no swelling [Full ROM] : full range of motion [No Rashes] : no rashes [No Ulceration] : no ulceration [de-identified] : non-labored respirations  [de-identified] : ecchymosis from core biopsy, resolving in lateral Right breast. No masses

## 2021-04-21 NOTE — CONSULT LETTER
[Dear  ___] : Dear  [unfilled], [Consult Letter:] : I had the pleasure of evaluating your patient, [unfilled]. [Please see my note below.] : Please see my note below. [Consult Closing:] : Thank you very much for allowing me to participate in the care of this patient.  If you have any questions, please do not hesitate to contact me. [Sincerely,] : Sincerely, [FreeTextEntry3] : Milla Mena MD\par Breast Surgeon\par Division of Surgical Oncology\par Department of Surgery\par [address] [place] [address]\[place] [address] \par Tel: [phone]\par Fax: [phone]\par Email: [email or web address]

## 2021-04-30 ENCOUNTER — NON-APPOINTMENT (OUTPATIENT)
Age: 56
End: 2021-04-30

## 2021-05-03 ENCOUNTER — OUTPATIENT (OUTPATIENT)
Dept: OUTPATIENT SERVICES | Facility: HOSPITAL | Age: 56
LOS: 1 days | End: 2021-05-03
Payer: MEDICAID

## 2021-05-03 VITALS
HEIGHT: 64.5 IN | HEART RATE: 77 BPM | TEMPERATURE: 98 F | WEIGHT: 194.01 LBS | RESPIRATION RATE: 17 BRPM | SYSTOLIC BLOOD PRESSURE: 130 MMHG | OXYGEN SATURATION: 99 % | DIASTOLIC BLOOD PRESSURE: 80 MMHG

## 2021-05-03 DIAGNOSIS — C50.911 MALIGNANT NEOPLASM OF UNSPECIFIED SITE OF RIGHT FEMALE BREAST: ICD-10-CM

## 2021-05-03 DIAGNOSIS — Z98.890 OTHER SPECIFIED POSTPROCEDURAL STATES: Chronic | ICD-10-CM

## 2021-05-03 DIAGNOSIS — C50.919 MALIGNANT NEOPLASM OF UNSPECIFIED SITE OF UNSPECIFIED FEMALE BREAST: ICD-10-CM

## 2021-05-03 LAB
A1C WITH ESTIMATED AVERAGE GLUCOSE RESULT: 6.7 % — HIGH (ref 4–5.6)
ALBUMIN SERPL ELPH-MCNC: 4.6 G/DL — SIGNIFICANT CHANGE UP (ref 3.3–5)
ALP SERPL-CCNC: 56 U/L — SIGNIFICANT CHANGE UP (ref 40–120)
ALT FLD-CCNC: 28 U/L — SIGNIFICANT CHANGE UP (ref 4–33)
ANION GAP SERPL CALC-SCNC: 14 MMOL/L — SIGNIFICANT CHANGE UP (ref 7–14)
AST SERPL-CCNC: 31 U/L — SIGNIFICANT CHANGE UP (ref 4–32)
BILIRUB SERPL-MCNC: 0.2 MG/DL — SIGNIFICANT CHANGE UP (ref 0.2–1.2)
BUN SERPL-MCNC: 11 MG/DL — SIGNIFICANT CHANGE UP (ref 7–23)
CALCIUM SERPL-MCNC: 9.8 MG/DL — SIGNIFICANT CHANGE UP (ref 8.4–10.5)
CHLORIDE SERPL-SCNC: 104 MMOL/L — SIGNIFICANT CHANGE UP (ref 98–107)
CO2 SERPL-SCNC: 25 MMOL/L — SIGNIFICANT CHANGE UP (ref 22–31)
CREAT SERPL-MCNC: 0.71 MG/DL — SIGNIFICANT CHANGE UP (ref 0.5–1.3)
ESTIMATED AVERAGE GLUCOSE: 146 MG/DL — HIGH (ref 68–114)
GLUCOSE SERPL-MCNC: 95 MG/DL — SIGNIFICANT CHANGE UP (ref 70–99)
HCT VFR BLD CALC: 42.7 % — SIGNIFICANT CHANGE UP (ref 34.5–45)
HGB BLD-MCNC: 13.6 G/DL — SIGNIFICANT CHANGE UP (ref 11.5–15.5)
MCHC RBC-ENTMCNC: 27.1 PG — SIGNIFICANT CHANGE UP (ref 27–34)
MCHC RBC-ENTMCNC: 31.9 GM/DL — LOW (ref 32–36)
MCV RBC AUTO: 85.1 FL — SIGNIFICANT CHANGE UP (ref 80–100)
NRBC # BLD: 0 /100 WBCS — SIGNIFICANT CHANGE UP
NRBC # FLD: 0 K/UL — SIGNIFICANT CHANGE UP
PLATELET # BLD AUTO: 342 K/UL — SIGNIFICANT CHANGE UP (ref 150–400)
POTASSIUM SERPL-MCNC: 3.5 MMOL/L — SIGNIFICANT CHANGE UP (ref 3.5–5.3)
POTASSIUM SERPL-SCNC: 3.5 MMOL/L — SIGNIFICANT CHANGE UP (ref 3.5–5.3)
PROT SERPL-MCNC: 8 G/DL — SIGNIFICANT CHANGE UP (ref 6–8.3)
RBC # BLD: 5.02 M/UL — SIGNIFICANT CHANGE UP (ref 3.8–5.2)
RBC # FLD: 12.4 % — SIGNIFICANT CHANGE UP (ref 10.3–14.5)
SODIUM SERPL-SCNC: 143 MMOL/L — SIGNIFICANT CHANGE UP (ref 135–145)
WBC # BLD: 8.17 K/UL — SIGNIFICANT CHANGE UP (ref 3.8–10.5)
WBC # FLD AUTO: 8.17 K/UL — SIGNIFICANT CHANGE UP (ref 3.8–10.5)

## 2021-05-03 PROCEDURE — 93010 ELECTROCARDIOGRAM REPORT: CPT

## 2021-05-03 RX ORDER — METOPROLOL TARTRATE 50 MG
1 TABLET ORAL
Qty: 0 | Refills: 0 | DISCHARGE

## 2021-05-03 NOTE — H&P PST ADULT - ASSESSMENT
DX: malignant neoplasm of unspecified site of right female breast and evaluated for a scheduled right lumpectomy with rahat localization right sentinel lymph node biopsy on 5/17/21.

## 2021-05-03 NOTE — H&P PST ADULT - HISTORY OF PRESENT ILLNESS
55 y/o F presents to PST w/ a preop dx of malignant neoplasm of unspecified site of right female breast and to be evaluated for a scheduled right lumpectomy with rahat localization right sentinel lymph node biopsy on 5/17/21. Pt states last mammo on 4/2021 was noted to be abnormal. US done and bx sent, malignant so referred to Dr. Mena who recommended surgical intervention at this time.

## 2021-05-03 NOTE — H&P PST ADULT - NSICDXPROBLEM_GEN_ALL_CORE_FT
PROBLEM DIAGNOSES  Problem: Malignant neoplasm of unspecified site of right female breast  Assessment and Plan: Preop instructions provided including npo status, Hibiclens wash for infection control and GI prophylasix. Pt to c/w current meds, aware of last DM (metformin) medication to be taken on 5/16/21 in am, no pm dose and hold on dos, aware to stop any NSAIDS, OTC herbals or MVI on 5/10/21. Verbilized understanding. BW done, pending results. DVT prophylasix as per primary team. MC pending, form provided. Aware to f/u on Covid 19 testing prior to sx as per pst protocol and has appt. JOSEPH precautions/allergy norified to OR booking via fax.

## 2021-05-03 NOTE — H&P PST ADULT - BLOOD AVOIDANCE/RESTRICTIONS, PROFILE
none

## 2021-05-03 NOTE — H&P PST ADULT - NSICDXPASTSURGICALHX_GEN_ALL_CORE_FT
PAST SURGICAL HISTORY:  No significant past surgical history      PAST SURGICAL HISTORY:  S/P breast biopsy 4/2021

## 2021-05-03 NOTE — H&P PST ADULT - NSANTHOSAYNRD_GEN_A_CORE
Denies sleep studies done in the past/No. JOSEPH screening performed.  STOP BANG Legend: 0-2 = LOW Risk; 3-4 = INTERMEDIATE Risk; 5-8 = HIGH Risk

## 2021-05-03 NOTE — H&P PST ADULT - NSICDXPASTMEDICALHX_GEN_ALL_CORE_FT
PAST MEDICAL HISTORY:  Diabetes     HTN (hypertension)      PAST MEDICAL HISTORY:  Breast CA     Diabetes     HTN (hypertension)     Thyroid nodule

## 2021-05-03 NOTE — H&P PST ADULT - HEALTH CARE MAINTENANCE
Colonoscopy: denies/ Fit test done and neg.   flu vaccine: 2020   Covid Vaccine: (Pfyser) x 2   Denies current covid S&S or in the past, test neg when done. Pt denies history of travel outside the country and outside [place] and denies COVID19 positive contacts within the last 14 days.

## 2021-05-05 PROBLEM — E04.1 NONTOXIC SINGLE THYROID NODULE: Chronic | Status: ACTIVE | Noted: 2021-05-03

## 2021-05-05 PROBLEM — E11.9 TYPE 2 DIABETES MELLITUS WITHOUT COMPLICATIONS: Chronic | Status: ACTIVE | Noted: 2021-05-03

## 2021-05-05 PROBLEM — C50.919 MALIGNANT NEOPLASM OF UNSPECIFIED SITE OF UNSPECIFIED FEMALE BREAST: Chronic | Status: ACTIVE | Noted: 2021-05-03

## 2021-05-10 ENCOUNTER — APPOINTMENT (OUTPATIENT)
Dept: ULTRASOUND IMAGING | Facility: IMAGING CENTER | Age: 56
End: 2021-05-10
Payer: MEDICAID

## 2021-05-10 ENCOUNTER — OUTPATIENT (OUTPATIENT)
Dept: OUTPATIENT SERVICES | Facility: HOSPITAL | Age: 56
LOS: 1 days | End: 2021-05-10
Payer: MEDICAID

## 2021-05-10 ENCOUNTER — NON-APPOINTMENT (OUTPATIENT)
Age: 56
End: 2021-05-10

## 2021-05-10 ENCOUNTER — RESULT REVIEW (OUTPATIENT)
Age: 56
End: 2021-05-10

## 2021-05-10 DIAGNOSIS — Z98.890 OTHER SPECIFIED POSTPROCEDURAL STATES: Chronic | ICD-10-CM

## 2021-05-10 DIAGNOSIS — Z00.8 ENCOUNTER FOR OTHER GENERAL EXAMINATION: ICD-10-CM

## 2021-05-10 PROCEDURE — 19285 PERQ DEV BREAST 1ST US IMAG: CPT | Mod: RT

## 2021-05-10 PROCEDURE — C1739: CPT

## 2021-05-10 PROCEDURE — 19285 PERQ DEV BREAST 1ST US IMAG: CPT

## 2021-05-12 ENCOUNTER — APPOINTMENT (OUTPATIENT)
Dept: INTERNAL MEDICINE | Facility: CLINIC | Age: 56
End: 2021-05-12
Payer: MEDICAID

## 2021-05-12 ENCOUNTER — OUTPATIENT (OUTPATIENT)
Dept: OUTPATIENT SERVICES | Facility: HOSPITAL | Age: 56
LOS: 1 days | End: 2021-05-12

## 2021-05-12 ENCOUNTER — NON-APPOINTMENT (OUTPATIENT)
Age: 56
End: 2021-05-12

## 2021-05-12 ENCOUNTER — RESULT REVIEW (OUTPATIENT)
Age: 56
End: 2021-05-12

## 2021-05-12 VITALS
HEART RATE: 73 BPM | RESPIRATION RATE: 16 BRPM | SYSTOLIC BLOOD PRESSURE: 138 MMHG | WEIGHT: 194 LBS | DIASTOLIC BLOOD PRESSURE: 80 MMHG | HEIGHT: 66 IN | BODY MASS INDEX: 31.18 KG/M2 | OXYGEN SATURATION: 97 %

## 2021-05-12 VITALS — TEMPERATURE: 97.7 F

## 2021-05-12 DIAGNOSIS — Z98.890 OTHER SPECIFIED POSTPROCEDURAL STATES: Chronic | ICD-10-CM

## 2021-05-12 LAB
ANION GAP SERPL CALC-SCNC: 12 MMOL/L — SIGNIFICANT CHANGE UP (ref 7–14)
BASOPHILS # BLD AUTO: 0.09 K/UL — SIGNIFICANT CHANGE UP (ref 0–0.2)
BASOPHILS NFR BLD AUTO: 1 % — SIGNIFICANT CHANGE UP (ref 0–2)
BUN SERPL-MCNC: 10 MG/DL — SIGNIFICANT CHANGE UP (ref 7–23)
CALCIUM SERPL-MCNC: 9.8 MG/DL — SIGNIFICANT CHANGE UP (ref 8.4–10.5)
CHLORIDE SERPL-SCNC: 103 MMOL/L — SIGNIFICANT CHANGE UP (ref 98–107)
CO2 SERPL-SCNC: 27 MMOL/L — SIGNIFICANT CHANGE UP (ref 22–31)
CREAT SERPL-MCNC: 0.61 MG/DL — SIGNIFICANT CHANGE UP (ref 0.5–1.3)
EOSINOPHIL # BLD AUTO: 0.65 K/UL — HIGH (ref 0–0.5)
EOSINOPHIL NFR BLD AUTO: 7.4 % — HIGH (ref 0–6)
GLUCOSE SERPL-MCNC: 129 MG/DL — HIGH (ref 70–99)
HCT VFR BLD CALC: 44.3 % — SIGNIFICANT CHANGE UP (ref 34.5–45)
HGB BLD-MCNC: 14.1 G/DL — SIGNIFICANT CHANGE UP (ref 11.5–15.5)
IANC: 4.46 K/UL — SIGNIFICANT CHANGE UP (ref 1.5–8.5)
IMM GRANULOCYTES NFR BLD AUTO: 0.1 % — SIGNIFICANT CHANGE UP (ref 0–1.5)
LYMPHOCYTES # BLD AUTO: 2.93 K/UL — SIGNIFICANT CHANGE UP (ref 1–3.3)
LYMPHOCYTES # BLD AUTO: 33.2 % — SIGNIFICANT CHANGE UP (ref 13–44)
MCHC RBC-ENTMCNC: 27.3 PG — SIGNIFICANT CHANGE UP (ref 27–34)
MCHC RBC-ENTMCNC: 31.8 GM/DL — LOW (ref 32–36)
MCV RBC AUTO: 85.9 FL — SIGNIFICANT CHANGE UP (ref 80–100)
MONOCYTES # BLD AUTO: 0.69 K/UL — SIGNIFICANT CHANGE UP (ref 0–0.9)
MONOCYTES NFR BLD AUTO: 7.8 % — SIGNIFICANT CHANGE UP (ref 2–14)
NEUTROPHILS # BLD AUTO: 4.46 K/UL — SIGNIFICANT CHANGE UP (ref 1.8–7.4)
NEUTROPHILS NFR BLD AUTO: 50.5 % — SIGNIFICANT CHANGE UP (ref 43–77)
NRBC # BLD: 0 /100 WBCS — SIGNIFICANT CHANGE UP
NRBC # FLD: 0 K/UL — SIGNIFICANT CHANGE UP
PLATELET # BLD AUTO: 345 K/UL — SIGNIFICANT CHANGE UP (ref 150–400)
POTASSIUM SERPL-MCNC: 3.8 MMOL/L — SIGNIFICANT CHANGE UP (ref 3.5–5.3)
POTASSIUM SERPL-SCNC: 3.8 MMOL/L — SIGNIFICANT CHANGE UP (ref 3.5–5.3)
RBC # BLD: 5.16 M/UL — SIGNIFICANT CHANGE UP (ref 3.8–5.2)
RBC # FLD: 12.1 % — SIGNIFICANT CHANGE UP (ref 10.3–14.5)
SODIUM SERPL-SCNC: 142 MMOL/L — SIGNIFICANT CHANGE UP (ref 135–145)
WBC # BLD: 8.83 K/UL — SIGNIFICANT CHANGE UP (ref 3.8–10.5)
WBC # FLD AUTO: 8.83 K/UL — SIGNIFICANT CHANGE UP (ref 3.8–10.5)

## 2021-05-12 PROCEDURE — 99214 OFFICE O/P EST MOD 30 MIN: CPT | Mod: GC

## 2021-05-12 RX ORDER — AMOXICILLIN 500 MG/1
500 TABLET, FILM COATED ORAL
Qty: 56 | Refills: 0 | Status: COMPLETED | COMMUNITY
Start: 2021-04-14 | End: 2021-05-10

## 2021-05-12 RX ORDER — OMEPRAZOLE 40 MG/1
40 CAPSULE, DELAYED RELEASE ORAL
Qty: 28 | Refills: 0 | Status: COMPLETED | COMMUNITY
Start: 2021-04-14 | End: 2021-05-10

## 2021-05-12 RX ORDER — CLARITHROMYCIN 500 MG/1
500 TABLET, FILM COATED ORAL TWICE DAILY
Qty: 28 | Refills: 0 | Status: COMPLETED | COMMUNITY
Start: 2021-04-14 | End: 2021-05-10

## 2021-05-12 NOTE — PHYSICAL EXAM
[Normal Outer Ear/Nose] : the outer ears and nose were normal in appearance [No Lymphadenopathy] : no lymphadenopathy [Supple] : supple [No Abdominal Bruit] : a ~M bruit was not heard ~T in the abdomen [No Varicosities] : no varicosities [Pedal Pulses Present] : the pedal pulses are present [No Edema] : there was no peripheral edema [No Palpable Aorta] : no palpable aorta [No Extremity Clubbing/Cyanosis] : no extremity clubbing/cyanosis [Declined Breast Exam] : declined breast exam  [Non Tender] : non-tender [Soft] : abdomen soft [No HSM] : no HSM [Non-distended] : non-distended [Normal Bowel Sounds] : normal bowel sounds [Normal] : no rash [Coordination Grossly Intact] : coordination grossly intact [No Focal Deficits] : no focal deficits [Normal Gait] : normal gait [Normal Affect] : the affect was normal [Alert and Oriented x3] : oriented to person, place, and time [Normal Insight/Judgement] : insight and judgment were intact

## 2021-05-13 DIAGNOSIS — Z01.818 ENCOUNTER FOR OTHER PREPROCEDURAL EXAMINATION: ICD-10-CM

## 2021-05-13 DIAGNOSIS — E11.9 TYPE 2 DIABETES MELLITUS WITHOUT COMPLICATIONS: ICD-10-CM

## 2021-05-13 LAB
GLUCOSE BLDC GLUCOMTR-MCNC: 128
HBA1C MFR BLD HPLC: 6.7

## 2021-05-13 NOTE — RESULTS/DATA
[] : not indicated [de-identified] : drawn today [de-identified] : drawn today [de-identified] : done today [de-identified] : POC A1c 6.7

## 2021-05-13 NOTE — ASSESSMENT
[As per surgery] : as per surgery [High Risk Surgery - Intraperitoneal, Intrathoracic or Supringuinal Vascular Procedures] : High Risk Surgery - Intraperitoneal, Intrathoracic or Supringuinal Vascular Procedures - No (0) [Ischemic Heart Disease] : Ischemic Heart Disease - No (0) [Congestive Heart Failure] : Congestive Heart Failure - No (0) [Prior Cerebrovascular Accident or TIA] : Prior Cerebrovascular Accident or TIA - No (0) [Creatinine >= 2mg/dL (1 Point)] : Creatinine >= 2mg/dL - No (0) [Insulin-dependent Diabetic (1 Point)] : Insulin-dependent Diabetic - No (0) [0] : 0 , RCRI Class: I, Risk of Post-Op Cardiac Complications: 3.9%, 95% CI for Risk Estimate: 2.8% - 5.4% [Patient Optimized for Surgery] : Patient optimized for surgery [No Further Testing Recommended] : no further testing recommended [Continue medications as is] : Continue current medications [FreeTextEntry4] : Patient is a 55 y/o Upper sorbian speaking woman with HTN, DM2 (A1c 6.6 2/2021), fatty liver and gallstones on US, GERD, H pylori now s/p triple therapy, multinodular goiter, nephrolithiasis, and recently diagnosed invasive moderately differentiated ductal carcinoma of R breast, presents today for pre-operative optimization prior to R breast surgery. Patient denies current cardiopulmonary symptoms, denies hx of significant cardiac or pulmonary hx, and denies high risk medications including blood thinners, NSAIDs, and sedatives.\par \par #pre-operative clearance\par - POC A1c today is 6.7\par - CBC, BMP labs drawn today, EKG ordered\par - RCRI score of 0\par - pending bloodwork and EKG, patient will be optimized for surgery if all are wnl\par \par #DM, non-insulin dependent\par - recommend to start statin at next visit\par - patient counseled on weight loss

## 2021-05-13 NOTE — HISTORY OF PRESENT ILLNESS
[Diabetes] : diabetes [Excellent (>10 METs)] : Excellent (>10 METs) [[employer] ] : provided by [employer]   [Time Spent: ____ minutes] : I have spent [unfilled] minutes of time on the encounter. The patient's primary language is not English thus required  services. [Aortic Stenosis] : no aortic stenosis [Atrial Fibrillation] : no atrial fibrillation [Coronary Artery Disease] : no coronary artery disease [Recent Myocardial Infarction] : no recent myocardial infarction [Implantable Device/Pacemaker] : no implantable device/pacemaker [Asthma] : no asthma [COPD] : no COPD [Sleep Apnea] : no sleep apnea [Smoker] : not a smoker [Family Member] : no family member with adverse anesthesia reaction/sudden death [Self] : no previous adverse anesthesia reaction [Chronic Anticoagulation] : no chronic anticoagulation [Chronic Kidney Disease] : no chronic kidney disease [FreeTextEntry2] : 5/17/21 [FreeTextEntry3] : Dr. Loyola Rajesh [FreeTextEntry4] : Patient is a 57 y/o Armenian speaking woman with HTN, DM2 (A1c 6.6 2/2021), fatty liver and gallstones on US, GERD, H pylori now s/p triple therapy, multinodular goiter, nephrolithiasis, and recently diagnosed invasive moderately differentiated ductal carcinoma of R breast, presents today for pre-operative optimization prior to R breast surgery. Patient was interviewed with Armenian phone  [phone].\par \par Today patient reports feeling well. She reports good compliance with prescribed medications. BPs measured at home is 130s/80s. Patient has now finished triple therapy for H pylori as of two days ago. She continues to report mild positional stomach discomfort, unrelated to food intake. She denies cardiopulmonary symptoms including CP, palpitations, dyspnea on exertion, orthopnea, syncope, and claudication. Patient endorses frequent snoring but has never had a sleep study; she denies morning headaches and significant daytime sleepiness. Current exercise tolerance is 10-12 blocks on flat ground and >3 flights of stairs. Remote smoking history >20 years ago.\par  [FreeTextEntry7] : Seen by cardiology outpatient for HTN, most recent office visit 09/2019. BP well-controlled on lisinopril, lopressor, HCTZ. Hx of hypokalemia on HCTZ, prescribed K supplementation. Normal echo in 7/2019. [FreeTextEntry1] : [id number] [TWNoteComboBox1] : [place]

## 2021-05-13 NOTE — REVIEW OF SYSTEMS
[Abdominal Pain] : abdominal pain [Joint Pain] : joint pain [Negative] : Heme/Lymph [Nausea] : no nausea [Constipation] : no constipation [Diarrhea] : diarrhea [Vomiting] : no vomiting [Melena] : no melena [Joint Stiffness] : no joint stiffness [Joint Swelling] : no joint swelling [Muscle Weakness] : no muscle weakness [Muscle Pain] : no muscle pain [FreeTextEntry9] : mild L knee pain

## 2021-05-14 ENCOUNTER — NON-APPOINTMENT (OUTPATIENT)
Age: 56
End: 2021-05-14

## 2021-05-14 ENCOUNTER — APPOINTMENT (OUTPATIENT)
Dept: DISASTER EMERGENCY | Facility: CLINIC | Age: 56
End: 2021-05-14

## 2021-05-14 DIAGNOSIS — Z86.2 PERSONAL HISTORY OF DISEASES OF THE BLOOD AND BLOOD-FORMING ORGANS AND CERTAIN DISORDERS INVOLVING THE IMMUNE MECHANISM: ICD-10-CM

## 2021-05-15 LAB — SARS-COV-2 N GENE NPH QL NAA+PROBE: NOT DETECTED

## 2021-05-16 ENCOUNTER — TRANSCRIPTION ENCOUNTER (OUTPATIENT)
Age: 56
End: 2021-05-16

## 2021-05-17 ENCOUNTER — OUTPATIENT (OUTPATIENT)
Dept: OUTPATIENT SERVICES | Facility: HOSPITAL | Age: 56
LOS: 1 days | Discharge: HOME | End: 2021-05-17
Payer: MEDICAID

## 2021-05-17 ENCOUNTER — OUTPATIENT (OUTPATIENT)
Dept: OUTPATIENT SERVICES | Facility: HOSPITAL | Age: 56
LOS: 1 days | End: 2021-05-17
Payer: COMMERCIAL

## 2021-05-17 ENCOUNTER — RESULT REVIEW (OUTPATIENT)
Age: 56
End: 2021-05-17

## 2021-05-17 ENCOUNTER — APPOINTMENT (OUTPATIENT)
Dept: NUCLEAR MEDICINE | Facility: IMAGING CENTER | Age: 56
End: 2021-05-17
Payer: MEDICAID

## 2021-05-17 ENCOUNTER — APPOINTMENT (OUTPATIENT)
Dept: SURGICAL ONCOLOGY | Facility: AMBULATORY SURGERY CENTER | Age: 56
End: 2021-05-17

## 2021-05-17 ENCOUNTER — APPOINTMENT (OUTPATIENT)
Dept: MAMMOGRAPHY | Facility: IMAGING CENTER | Age: 56
End: 2021-05-17
Payer: MEDICAID

## 2021-05-17 VITALS
HEIGHT: 64 IN | HEART RATE: 94 BPM | SYSTOLIC BLOOD PRESSURE: 160 MMHG | TEMPERATURE: 98 F | RESPIRATION RATE: 98 BRPM | DIASTOLIC BLOOD PRESSURE: 85 MMHG | WEIGHT: 194.01 LBS

## 2021-05-17 VITALS
RESPIRATION RATE: 17 BRPM | TEMPERATURE: 98 F | OXYGEN SATURATION: 100 % | SYSTOLIC BLOOD PRESSURE: 121 MMHG | DIASTOLIC BLOOD PRESSURE: 74 MMHG | HEART RATE: 85 BPM

## 2021-05-17 DIAGNOSIS — C50.911 MALIGNANT NEOPLASM OF UNSPECIFIED SITE OF RIGHT FEMALE BREAST: ICD-10-CM

## 2021-05-17 DIAGNOSIS — Z98.890 OTHER SPECIFIED POSTPROCEDURAL STATES: Chronic | ICD-10-CM

## 2021-05-17 DIAGNOSIS — Z00.8 ENCOUNTER FOR OTHER GENERAL EXAMINATION: ICD-10-CM

## 2021-05-17 LAB — GLUCOSE BLDC GLUCOMTR-MCNC: 121 MG/DL — HIGH (ref 70–99)

## 2021-05-17 PROCEDURE — 38792 RA TRACER ID OF SENTINL NODE: CPT | Mod: 59

## 2021-05-17 PROCEDURE — 88305 TISSUE EXAM BY PATHOLOGIST: CPT | Mod: 26

## 2021-05-17 PROCEDURE — 38900 IO MAP OF SENT LYMPH NODE: CPT

## 2021-05-17 PROCEDURE — 76098 X-RAY EXAM SURGICAL SPECIMEN: CPT

## 2021-05-17 PROCEDURE — 76098 X-RAY EXAM SURGICAL SPECIMEN: CPT | Mod: 26

## 2021-05-17 PROCEDURE — 19302 P-MASTECTOMY W/LN REMOVAL: CPT

## 2021-05-17 PROCEDURE — A9541: CPT

## 2021-05-17 PROCEDURE — 88307 TISSUE EXAM BY PATHOLOGIST: CPT | Mod: 26

## 2021-05-17 RX ORDER — OXYCODONE HYDROCHLORIDE 5 MG/1
1 TABLET ORAL
Qty: 5 | Refills: 0
Start: 2021-05-17

## 2021-05-17 NOTE — ASU DISCHARGE PLAN (ADULT/PEDIATRIC) - CARE PROVIDER_API CALL
Milla Mena)  Surgery  [address] [place] [address]  Phone: [phone]  Fax: [phone]  Follow Up Time: 1 week

## 2021-05-17 NOTE — BRIEF OPERATIVE NOTE - SPECIMENS
Right Axillary [place] Lymph Nodes #1, #2, #3; Right Breast Lumpectomy with Superior, Medial, Inferior, Lateral, Deep, and Anterior Margins

## 2021-05-17 NOTE — ASU DISCHARGE PLAN (ADULT/PEDIATRIC) - CARE PROVIDER_API CALL
Milla Mena)  Surgery  [address] [place] [address]  Phone: [phone]  Fax: [phone]  Follow Up Time: 1 week

## 2021-05-17 NOTE — ASU DISCHARGE PLAN (ADULT/PEDIATRIC) - CALL YOUR DOCTOR IF YOU HAVE ANY OF THE FOLLOWING:
Bleeding that does not stop/Swelling that gets worse/Pain not relieved by Medications/Fever greater than (need to indicate Fahrenheit or Celsius)/Wound/Surgical Site with redness, or foul smelling discharge or pus/Numbness, tingling, color or temperature change to extremity/Nausea and vomiting that does not stop/Inability to tolerate liquids or foods

## 2021-05-17 NOTE — BRIEF OPERATIVE NOTE - NSICDXBRIEFPROCEDURE_GEN_ALL_CORE_FT
PROCEDURES:  Lumpectomy of right breast with sentinel node biopsy 17-May-2021 10:12:22  Rosy Lopez  Injection of isosulfan blue 17-May-2021 10:16:04  Rosy Lopez  Injection, radioactive tracer, for sentinel lymph node identification 17-May-2021 10:16:37  Rosy Lopez

## 2021-05-17 NOTE — ASU DISCHARGE PLAN (ADULT/PEDIATRIC) - PROCEDURE
Right Breat Lumpectomy and [place] Lymph Node Biopsy Right Breat Lumpectomy and [place] Lymph Node Biopsy

## 2021-05-17 NOTE — ASU DISCHARGE PLAN (ADULT/PEDIATRIC) - PROCEDURE
Right Breast Lumpectomy with [place] Lymph Node Biopsy Right Breast Lumpectomy with [place] Lymph Node Biopsy

## 2021-05-17 NOTE — ASU DISCHARGE PLAN (ADULT/PEDIATRIC) - CALL YOUR DOCTOR IF YOU HAVE ANY OF THE FOLLOWING:
Bleeding that does not stop/Swelling that gets worse/Pain not relieved by Medications/Fever greater than (need to indicate Fahrenheit or Celsius)/Wound/Surgical Site with redness, or foul smelling discharge or pus/Numbness, tingling, color or temperature change to extremity/Nausea and vomiting that does not stop/Inability to tolerate liquids or foods Bleeding that does not stop/Swelling that gets worse/Fever greater than (need to indicate Fahrenheit or Celsius)/Wound/Surgical Site with redness, or foul smelling discharge or pus/Numbness, tingling, color or temperature change to extremity/Nausea and vomiting that does not stop/Inability to tolerate liquids or foods

## 2021-05-20 LAB — SURGICAL PATHOLOGY STUDY: SIGNIFICANT CHANGE UP

## 2021-05-21 ENCOUNTER — APPOINTMENT (OUTPATIENT)
Dept: INTERNAL MEDICINE | Facility: CLINIC | Age: 56
End: 2021-05-21

## 2021-05-21 VITALS — TEMPERATURE: 96.4 F

## 2021-05-26 ENCOUNTER — NON-APPOINTMENT (OUTPATIENT)
Age: 56
End: 2021-05-26

## 2021-05-26 ENCOUNTER — APPOINTMENT (OUTPATIENT)
Dept: SURGICAL ONCOLOGY | Facility: CLINIC | Age: 56
End: 2021-05-26
Payer: MEDICAID

## 2021-05-26 VITALS
BODY MASS INDEX: 31.18 KG/M2 | HEART RATE: 84 BPM | DIASTOLIC BLOOD PRESSURE: 78 MMHG | TEMPERATURE: 97.3 F | WEIGHT: 194 LBS | SYSTOLIC BLOOD PRESSURE: 137 MMHG | HEIGHT: 66 IN | RESPIRATION RATE: 16 BRPM | OXYGEN SATURATION: 97 %

## 2021-05-26 PROCEDURE — 99024 POSTOP FOLLOW-UP VISIT: CPT

## 2021-05-26 NOTE — CONSULT LETTER
[Dear  ___] : Dear  [unfilled], [Please see my note below.] : Please see my note below. [Consult Closing:] : Thank you very much for allowing me to participate in the care of this patient.  If you have any questions, please do not hesitate to contact me. [Sincerely,] : Sincerely, [FreeTextEntry3] : Milla Mena MD\par Breast Surgeon\par Division of Surgical Oncology\par Department of Surgery\par [address] [place] [address]\[place] [address] \par Tel: [phone]\par Fax: [phone]\par Email: [email or web address]

## 2021-05-26 NOTE — PHYSICAL EXAM
[Normocephalic] : normocephalic [Atraumatic] : atraumatic [EOMI] : extra ocular movement intact [PERRL] : pupils equal, round and reactive to light [Sclera nonicteric] : sclera nonicteric [No Edema] : no edema [No Swelling] : no swelling [Full ROM] : full range of motion [de-identified] : curvilinear incision healing well, c/d/i, no erythema, no induration, no obvious fluctuance. [de-identified] : incision c/d/i. No erythema, no induration, no obvious fluctuance

## 2021-05-26 NOTE — ASSESSMENT
[FreeTextEntry1] : The patient is a 56 year old female with R breast IDC, moderately differentiated, DCIS intermediate grade, ER/WI positive, HER2 negative s/p R lumpectomy SLNB 5/17/2021. Path revealed 1.5 cm IDC, grade 2; 7 mm DCIS, low to intermediate grade. Margins negative. 0/3 SLNB. AJCC pT1cN0. Stage 1A.\par \par Plan:\par - Oncotype pending\par - Med Onc and rad onc referral\par - Next imaging 3/19/2022 L SM, R DM, B/L US\par

## 2021-05-26 NOTE — HISTORY OF PRESENT ILLNESS
[FreeTextEntry1] : The patient is a 56 year old female with R breast IDC, moderately differentiated, DCIS intermediate grade, ER/IA positive, HER2 negative s/p R lumpectomy SLNB 5/17/2021. Discussion facilitated by [employer]  Casey [id number].\par \par Prior imaging:\par 3/19/2021 B/L SM (NW) revealed scattered fibroglandular densities. TC 18.3%\par  - L upper breast ribbon clip\par  - R outer/upper outer breast 2.5 cm mass, N5\par  - R lateral to mass 6 mm cluster of calcs\par \par 3/24/2021 R DM \par  - R upper outer breast 1.4 x 1.6 cm with heterogeneous microcalcifications\par \par 3/24/2021 R US\par  - R 10:00 N5 1.5 x 1 x 0.9 cm irregular hypoechoic mass -> BR5\par  - R ax LN wnl\par \par 3/31/2021 USG-CNB\par  - R 10:00 N5: invasive moderately diff ductal carcinoma, DCIS, intermediate grade, focal. ER >98%, IA >98%, HER2 0\par \par 4/16/2021 MRI:\par  - R 1.9 x 2.6 x 2.0 cm mass with bx clip.\par  - L breast neg\par  - No axillary or internal mammary adenopathy\par  - L thyroid lobe nodule\par \par 5/17/2021 R lumpectomy SLNB revealed 1.5 cm IDC, grade 2; 7 mm DCIS, low to intermediate grade. Margins negative. 0/3 SLNB. pT1cN0.\par \par Interval history:\par Pt reports that she only took Oxycodone on the first day home; otherwise, she takes Tylenol sporadically. She notes that pain is not bad unless you touch the area, and she notes having discomfort during bumps in the car. Denies any limitations in arm movement. Reports some numbness inside her arm up to her elbow and some numbness on the breast. Denies fevers.

## 2021-06-04 ENCOUNTER — APPOINTMENT (OUTPATIENT)
Dept: RADIATION ONCOLOGY | Facility: CLINIC | Age: 56
End: 2021-06-04
Payer: MEDICAID

## 2021-06-04 ENCOUNTER — OUTPATIENT (OUTPATIENT)
Dept: OUTPATIENT SERVICES | Facility: HOSPITAL | Age: 56
LOS: 1 days | Discharge: HOME | End: 2021-06-04
Payer: MEDICAID

## 2021-06-04 VITALS
RESPIRATION RATE: 17 BRPM | BODY MASS INDEX: 31.92 KG/M2 | DIASTOLIC BLOOD PRESSURE: 90 MMHG | SYSTOLIC BLOOD PRESSURE: 161 MMHG | OXYGEN SATURATION: 99 % | WEIGHT: 198.63 LBS | TEMPERATURE: 97.9 F | HEIGHT: 66 IN | HEART RATE: 76 BPM

## 2021-06-04 DIAGNOSIS — Z98.890 OTHER SPECIFIED POSTPROCEDURAL STATES: Chronic | ICD-10-CM

## 2021-06-04 PROCEDURE — 77263 THER RADIOLOGY TX PLNG CPLX: CPT

## 2021-06-04 PROCEDURE — 99204 OFFICE O/P NEW MOD 45 MIN: CPT | Mod: 25

## 2021-06-04 RX ORDER — LIDOCAINE 40 MG/G
4 PATCH TOPICAL
Qty: 1 | Refills: 2 | Status: DISCONTINUED | COMMUNITY
Start: 2021-03-12 | End: 2021-06-04

## 2021-06-04 NOTE — HISTORY OF PRESENT ILLNESS
[FreeTextEntry1] : [employer]  Ukrainian # [id number]\par \par 55 yo female, unaccompanied\par \par Diagnosis:  R upper outer breast intraductal carcinoma  pT1c(1.5 cm) N0 (0/3), grade 2, with associated DCIS(7mm) intermediate grade, ER+ID+ HER2 negative\par \par Oncologic history/history of presenting complaint: Screening mammo 3/19/2021: R outer/upper outer breast 2.5 cm mass and R lateral to mass 6 mm cluster of calcs. Diagnostic right mammo 3/24/21 showed R upper outer breast mass 1.4 x 1.6 cm with heterogeneous microcalcifications. Sono:mass at 10:00: 1.5 x 1 x 0.9 cm irregular hypoechoic mass. \par \par 3/31/2021: Right breast biopsy 10:00: invasive moderately diff ductal carcinoma, DCIS, intermediate grade, focal. ER >98%, ID >98%, HER2 negative\par \par 2021 MRI: concordant with known malignancy right breast. No suspicious findings in left breast\par \par 2021:  R lumpectomy and  SLNB revealed 1.5 cm IDC, grade 2; 7 mm DCIS, low to intermediate grade. Margins negative(Fragmented nature of the final margin specimens preclude assessment of the distance). 0/3 SLNB. ER :  Positive, > 98%, PgR :  Positive, > 98%, HER2: Negative. pT1cN0, under care of Dr Mena.\par \par 21: Saw Dr. Mena. Referred to med/rad oncology. Oncotype DX pending\par \par Menarche:par \par FHx: brother leukemia and maternal aunt pancreatic cancer, both  \par \par Today: Feels generally well.  Right axillary burning relieved with tylenol, some numbness remaining under arm. She will see Dr. Max 21 to discuss adjuvant systemic therapy.\par \par \par \par

## 2021-06-04 NOTE — PHYSICAL EXAM
[General Appearance - Alert] : alert [General Appearance - In No Acute Distress] : in no acute distress [Sclera] : the sclera and conjunctiva were normal [] : no respiratory distress [Range of Motion to Joints] : range of motion to joints [Skin Color & Pigmentation] : normal skin color and pigmentation [No Focal Deficits] : no focal deficits [Oriented To Time, Place, And Person] : oriented to person, place, and time [Respiration, Rhythm And Depth] : normal respiratory rhythm and effort [Auscultation Breath Sounds / Voice Sounds] : lungs were clear to auscultation bilaterally [Edema] : no peripheral edema present [No UE Edema] : there is no upper extremity edema [Cervical Lymph Nodes Enlarged Posterior Bilaterally] : posterior cervical [Cervical Lymph Nodes Enlarged Anterior Bilaterally] : anterior cervical [Supraclavicular Lymph Nodes Enlarged Bilaterally] : supraclavicular [Axillary Lymph Nodes Enlarged Bilaterally] : axillary [Normal] : oriented to person, place and time, the affect was normal, the mood was normal and not anxious [de-identified] : healed right breast/axillary surgical incisions

## 2021-06-04 NOTE — REVIEW OF SYSTEMS
[Dry Eyes] : dryness of the eyes [Anxiety] : anxiety [Negative] : Heme/Lymph [Eye Pain] : no eye pain [Loss of Hearing] : no loss of hearing [Chest Pain] : no chest pain [Palpitations] : no palpitations [Shortness Of Breath] : no shortness of breath [Cough] : no cough [Urinary Frequency] : no change in urinary frequency [Vaginal Discharge] : no vaginal discharge [Dysmenorrhea/Abn Vaginal Bleeding] : no dysmenorrhea/abnormal vaginal bleeding [Joint Pain] : no joint pain [Gait Disturbance] : no gait disturbance [Skin Rash] : no skin rash [Dizziness] : no dizziness [de-identified] : healed right breast/axillary surgical incisions

## 2021-06-09 ENCOUNTER — OUTPATIENT (OUTPATIENT)
Dept: OUTPATIENT SERVICES | Facility: HOSPITAL | Age: 56
LOS: 1 days | Discharge: HOME | End: 2021-06-09

## 2021-06-09 DIAGNOSIS — Z98.890 OTHER SPECIFIED POSTPROCEDURAL STATES: Chronic | ICD-10-CM

## 2021-06-09 DIAGNOSIS — C50.919 MALIGNANT NEOPLASM OF UNSPECIFIED SITE OF UNSPECIFIED FEMALE BREAST: ICD-10-CM

## 2021-06-11 ENCOUNTER — APPOINTMENT (OUTPATIENT)
Dept: HEMATOLOGY ONCOLOGY | Facility: CLINIC | Age: 56
End: 2021-06-11
Payer: MEDICAID

## 2021-06-11 VITALS
RESPIRATION RATE: 16 BRPM | SYSTOLIC BLOOD PRESSURE: 145 MMHG | BODY MASS INDEX: 31.91 KG/M2 | TEMPERATURE: 97.6 F | WEIGHT: 196.21 LBS | DIASTOLIC BLOOD PRESSURE: 83 MMHG | HEIGHT: 65.75 IN | OXYGEN SATURATION: 98 % | HEART RATE: 82 BPM

## 2021-06-11 PROCEDURE — 99205 OFFICE O/P NEW HI 60 MIN: CPT

## 2021-06-11 RX ORDER — OXYCODONE 5 MG/1
5 TABLET ORAL AS DIRECTED
Qty: 5 | Refills: 0 | Status: DISCONTINUED | COMMUNITY
Start: 2021-05-17 | End: 2021-06-11

## 2021-06-11 RX ORDER — POTASSIUM CHLORIDE 750 MG/1
10 CAPSULE, EXTENDED RELEASE ORAL DAILY
Qty: 28 | Refills: 0 | Status: DISCONTINUED | COMMUNITY
Start: 2021-04-14 | End: 2021-06-11

## 2021-06-11 NOTE — ASSESSMENT
[FreeTextEntry1] : In summary, Ms. SHANTAL HANSON is a 56 year old postmenopausal female with stage IA (T1c, N0, M0) ER positive, NV positive, HER-2/katarzyna negative invasive ductal carcinoma of the right breast. She is status post lumpectomy and met with Dr Avila for radiation Oncology consultation. ODX 9 \par \par I discussed the treatment for stage I breast cancer with the patient including the role of chemotherapy, radiation therapy and endocrine therapy. Oncotype DX score is low and therefore chemotherapy will have no additional benefit over endocrine therapy alone (TAILORx)\par \par I recommend Arimidex 1 mg daily for five years. I discussed the risks and benefits of aromatase inhibitor therapy including fatigue, coronary artery disease, hyperlipidemia, vaginal dryness, mood changes, hot flashes, GI disturbances, arthralgias, myalgias, and osteoporosis. I will obtain bone density scan to evaluate her bone health prior to starting anastrozole. I recommend her to start calcium and vitamin D supplementation. \par \par Her medical comorbidities are managed well. She will continue to follow with her PCP for general health maintenance including fasting lipid profile every year. She will start anastrozole 2 weeks after finishing radiation therapy. She will continue annual mammograms. I will see her every 3 months to monitor treatment side effects. \par \par The patient had plenty of time to ask questions and all of her questions were answered to her satisfaction. I gave her my office phone number and encouraged her to call with any questions or additional information.

## 2021-06-11 NOTE — HISTORY OF PRESENT ILLNESS
[de-identified] : Ms. SHANTAL HANSON is a 56 year old female here for an evaluation of breast cancer. Her oncologic history is as follows:\par \par Screening mammo 3/19/2021: R outer/upper outer breast 2.5 cm mass and R lateral to mass 6 mm cluster of calcs. Diagnostic right mammo 3/24/21 showed R upper outer breast mass 1.4 x 1.6 cm with heterogeneous microcalcifications. Sono:mass at 10:00: 1.5 x 1 x 0.9 cm irregular hypoechoic mass. \par \par 3/31/2021: Right breast biopsy 10:00: invasive moderately diff ductal carcinoma, DCIS, intermediate grade, focal. ER >98%, NV >98%, HER2 negative\par \par 4/16/2021 MRI: concordant with known malignancy right breast. No suspicious findings in left breast\par \par 5/17/2021: R lumpectomy and SLNB revealed 1.5 cm IDC, grade 2; 7 mm DCIS, low to intermediate grade. Margins negative(Fragmented nature of the final margin specimens preclude assessment of the distance). 0/3 SLNB. ER : Positive, > 98%, PgR : Positive, > 98%, HER2: Negative. pT1cN0, under care of Dr Mena.\par \par  Oncotype DX 9

## 2021-06-11 NOTE — REASON FOR VISIT
[Initial Consultation] : an initial consultation [Other: _____] : [unfilled] [[employer] ] : provided by [employer]   [FreeTextEntry2] : breast ca, odx 9 [FreeTextEntry3] : [id number], zia [TWNoteComboBox1] : [place]se

## 2021-06-11 NOTE — CONSULT LETTER
[Dear  ___] : Dear  [unfilled], [Consult Letter:] : I had the pleasure of evaluating your patient, [unfilled]. [Please see my note below.] : Please see my note below. [Consult Closing:] : Thank you very much for allowing me to participate in the care of this patient.  If you have any questions, please do not hesitate to contact me. [Sincerely,] : Sincerely, [FreeTextEntry3] : Camila Max MD\par Division of Medical Oncology and Hematology\par [facility] [place]\par [employer] [facility]\par [place], [place]\par \par  [DrVenancio  ___] : Dr. MARTIN

## 2021-06-11 NOTE — OB HISTORY
[Definite:  ___ (Date)] : the last menstrual period was [unfilled] [Currently In Menopause] : currently in menopause [Menopause Age: ____] : age at menopause was [unfilled]

## 2021-06-11 NOTE — PHYSICAL EXAM
[Fully active, able to carry on all pre-disease performance without restriction] : Status 0 - Fully active, able to carry on all pre-disease performance without restriction [Normal] : bilateral breasts without nipple retraction, skin dimpling or palpable masses; the bilateral axillae are without adenopathy [de-identified] : right lumpectomy scar healing well

## 2021-06-16 PROCEDURE — 77290 THER RAD SIMULAJ FIELD CPLX: CPT | Mod: 26

## 2021-06-16 PROCEDURE — 77334 RADIATION TREATMENT AID(S): CPT | Mod: 26

## 2021-06-21 PROCEDURE — 77334 RADIATION TREATMENT AID(S): CPT | Mod: 26

## 2021-06-21 PROCEDURE — 77295 3-D RADIOTHERAPY PLAN: CPT | Mod: 26

## 2021-06-21 PROCEDURE — 77300 RADIATION THERAPY DOSE PLAN: CPT | Mod: 26

## 2021-06-22 ENCOUNTER — OUTPATIENT (OUTPATIENT)
Dept: OUTPATIENT SERVICES | Facility: HOSPITAL | Age: 56
LOS: 1 days | End: 2021-06-22

## 2021-06-22 ENCOUNTER — RESULT REVIEW (OUTPATIENT)
Age: 56
End: 2021-06-22

## 2021-06-22 ENCOUNTER — APPOINTMENT (OUTPATIENT)
Dept: INTERNAL MEDICINE | Facility: CLINIC | Age: 56
End: 2021-06-22

## 2021-06-22 DIAGNOSIS — A04.8 OTHER SPECIFIED BACTERIAL INTESTINAL INFECTIONS: ICD-10-CM

## 2021-06-22 DIAGNOSIS — Z98.890 OTHER SPECIFIED POSTPROCEDURAL STATES: Chronic | ICD-10-CM

## 2021-06-22 DIAGNOSIS — I10 ESSENTIAL (PRIMARY) HYPERTENSION: ICD-10-CM

## 2021-06-22 DIAGNOSIS — K76.0 FATTY (CHANGE OF) LIVER, NOT ELSEWHERE CLASSIFIED: ICD-10-CM

## 2021-06-22 DIAGNOSIS — K21.9 GASTRO-ESOPHAGEAL REFLUX DISEASE WITHOUT ESOPHAGITIS: ICD-10-CM

## 2021-06-22 DIAGNOSIS — E11.9 TYPE 2 DIABETES MELLITUS WITHOUT COMPLICATIONS: ICD-10-CM

## 2021-06-22 LAB
BASOPHILS # BLD AUTO: 0.07 K/UL — SIGNIFICANT CHANGE UP (ref 0–0.2)
BASOPHILS NFR BLD AUTO: 0.9 % — SIGNIFICANT CHANGE UP (ref 0–2)
EOSINOPHIL # BLD AUTO: 0.31 K/UL — SIGNIFICANT CHANGE UP (ref 0–0.5)
EOSINOPHIL NFR BLD AUTO: 4.2 % — SIGNIFICANT CHANGE UP (ref 0–6)
HCT VFR BLD CALC: 41.8 % — SIGNIFICANT CHANGE UP (ref 34.5–45)
HGB BLD-MCNC: 13.4 G/DL — SIGNIFICANT CHANGE UP (ref 11.5–15.5)
IANC: 4.17 K/UL — SIGNIFICANT CHANGE UP (ref 1.5–8.5)
IMM GRANULOCYTES NFR BLD AUTO: 0.3 % — SIGNIFICANT CHANGE UP (ref 0–1.5)
LYMPHOCYTES # BLD AUTO: 2.33 K/UL — SIGNIFICANT CHANGE UP (ref 1–3.3)
LYMPHOCYTES # BLD AUTO: 31.3 % — SIGNIFICANT CHANGE UP (ref 13–44)
MCHC RBC-ENTMCNC: 27.2 PG — SIGNIFICANT CHANGE UP (ref 27–34)
MCHC RBC-ENTMCNC: 32.1 GM/DL — SIGNIFICANT CHANGE UP (ref 32–36)
MCV RBC AUTO: 84.8 FL — SIGNIFICANT CHANGE UP (ref 80–100)
MONOCYTES # BLD AUTO: 0.54 K/UL — SIGNIFICANT CHANGE UP (ref 0–0.9)
MONOCYTES NFR BLD AUTO: 7.3 % — SIGNIFICANT CHANGE UP (ref 2–14)
NEUTROPHILS # BLD AUTO: 4.17 K/UL — SIGNIFICANT CHANGE UP (ref 1.8–7.4)
NEUTROPHILS NFR BLD AUTO: 56 % — SIGNIFICANT CHANGE UP (ref 43–77)
NRBC # BLD: 0 /100 WBCS — SIGNIFICANT CHANGE UP
NRBC # FLD: 0 K/UL — SIGNIFICANT CHANGE UP
PLATELET # BLD AUTO: 338 K/UL — SIGNIFICANT CHANGE UP (ref 150–400)
RBC # BLD: 4.93 M/UL — SIGNIFICANT CHANGE UP (ref 3.8–5.2)
RBC # FLD: 12.3 % — SIGNIFICANT CHANGE UP (ref 10.3–14.5)
WBC # BLD: 7.44 K/UL — SIGNIFICANT CHANGE UP (ref 3.8–10.5)
WBC # FLD AUTO: 7.44 K/UL — SIGNIFICANT CHANGE UP (ref 3.8–10.5)

## 2021-06-23 ENCOUNTER — OUTPATIENT (OUTPATIENT)
Dept: OUTPATIENT SERVICES | Facility: HOSPITAL | Age: 56
LOS: 1 days | End: 2021-06-23
Payer: MEDICAID

## 2021-06-23 ENCOUNTER — APPOINTMENT (OUTPATIENT)
Dept: RADIOLOGY | Facility: IMAGING CENTER | Age: 56
End: 2021-06-23
Payer: MEDICAID

## 2021-06-23 DIAGNOSIS — C50.911 MALIGNANT NEOPLASM OF UNSPECIFIED SITE OF RIGHT FEMALE BREAST: ICD-10-CM

## 2021-06-23 DIAGNOSIS — Z00.8 ENCOUNTER FOR OTHER GENERAL EXAMINATION: ICD-10-CM

## 2021-06-23 DIAGNOSIS — Z98.890 OTHER SPECIFIED POSTPROCEDURAL STATES: Chronic | ICD-10-CM

## 2021-06-23 PROCEDURE — 77080 DXA BONE DENSITY AXIAL: CPT

## 2021-06-23 PROCEDURE — 77080 DXA BONE DENSITY AXIAL: CPT | Mod: 26

## 2021-06-25 ENCOUNTER — NON-APPOINTMENT (OUTPATIENT)
Age: 56
End: 2021-06-25

## 2021-06-29 PROCEDURE — 77280 THER RAD SIMULAJ FIELD SMPL: CPT | Mod: 26

## 2021-07-06 NOTE — HISTORY OF PRESENT ILLNESS
[FreeTextEntry1] : Diagnosis: R upper outer breast intraductal carcinoma pT1c(1.5 cm) N0 (0/3), grade 2, with associated DCIS(7mm) intermediate grade, ER+MA+ HER2 negative\par \par \par 4/20 fx: feels as if she has lost her taste, only drinking Ensure drinks and water, skincare reviewed

## 2021-07-06 NOTE — DISEASE MANAGEMENT
[Pathological] : TNM Stage: p [TTNM] : 1b [NTNM] : 0 [IA] : IA [MTNM] : 0 [de-identified] : Right breast

## 2021-07-06 NOTE — REVIEW OF SYSTEMS
[Nausea: Grade 1 - Loss of appetite without alteration in eating habits] : Nausea: Grade 1 - Loss of appetite without alteration in eating habits [Fatigue: Grade 1 - Fatigue relieved by rest] : Fatigue: Grade 1 - Fatigue relieved by rest [Dermatitis Radiation: Grade 1 - Faint erythema or dry desquamation] : Dermatitis Radiation: Grade 1 - Faint erythema or dry desquamation

## 2021-07-07 PROCEDURE — 77427 RADIATION TX MANAGEMENT X5: CPT

## 2021-07-10 ENCOUNTER — INPATIENT (INPATIENT)
Facility: HOSPITAL | Age: 56
LOS: 1 days | Discharge: HOME | End: 2021-07-12
Attending: STUDENT IN AN ORGANIZED HEALTH CARE EDUCATION/TRAINING PROGRAM
Payer: MEDICAID

## 2021-07-10 ENCOUNTER — NON-APPOINTMENT (OUTPATIENT)
Age: 56
End: 2021-07-10

## 2021-07-10 VITALS
OXYGEN SATURATION: 98 % | RESPIRATION RATE: 20 BRPM | HEIGHT: 64 IN | SYSTOLIC BLOOD PRESSURE: 147 MMHG | HEART RATE: 110 BPM | TEMPERATURE: 102 F | DIASTOLIC BLOOD PRESSURE: 79 MMHG

## 2021-07-10 DIAGNOSIS — Z98.890 OTHER SPECIFIED POSTPROCEDURAL STATES: Chronic | ICD-10-CM

## 2021-07-10 LAB
BASOPHILS # BLD AUTO: 0.05 K/UL — SIGNIFICANT CHANGE UP (ref 0–0.2)
BASOPHILS NFR BLD AUTO: 0.3 % — SIGNIFICANT CHANGE UP (ref 0–2)
BLOOD GAS VENOUS COMPREHENSIVE RESULT: SIGNIFICANT CHANGE UP
EOSINOPHIL # BLD AUTO: 0.14 K/UL — SIGNIFICANT CHANGE UP (ref 0–0.5)
EOSINOPHIL NFR BLD AUTO: 0.8 % — SIGNIFICANT CHANGE UP (ref 0–6)
HCT VFR BLD CALC: 35.4 % — SIGNIFICANT CHANGE UP (ref 34.5–45)
HGB BLD-MCNC: 11.8 G/DL — SIGNIFICANT CHANGE UP (ref 11.5–15.5)
IANC: 14.37 K/UL — HIGH (ref 1.5–8.5)
IMM GRANULOCYTES NFR BLD AUTO: 1 % — SIGNIFICANT CHANGE UP (ref 0–1.5)
LYMPHOCYTES # BLD AUTO: 1.7 K/UL — SIGNIFICANT CHANGE UP (ref 1–3.3)
LYMPHOCYTES # BLD AUTO: 9.6 % — LOW (ref 13–44)
MCHC RBC-ENTMCNC: 26.8 PG — LOW (ref 27–34)
MCHC RBC-ENTMCNC: 33.3 GM/DL — SIGNIFICANT CHANGE UP (ref 32–36)
MCV RBC AUTO: 80.3 FL — SIGNIFICANT CHANGE UP (ref 80–100)
MONOCYTES # BLD AUTO: 1.18 K/UL — HIGH (ref 0–0.9)
MONOCYTES NFR BLD AUTO: 6.7 % — SIGNIFICANT CHANGE UP (ref 2–14)
NEUTROPHILS # BLD AUTO: 14.37 K/UL — HIGH (ref 1.8–7.4)
NEUTROPHILS NFR BLD AUTO: 81.6 % — HIGH (ref 43–77)
NRBC # BLD: 0 /100 WBCS — SIGNIFICANT CHANGE UP
NRBC # FLD: 0 K/UL — SIGNIFICANT CHANGE UP
PLATELET # BLD AUTO: 430 K/UL — HIGH (ref 150–400)
RBC # BLD: 4.41 M/UL — SIGNIFICANT CHANGE UP (ref 3.8–5.2)
RBC # FLD: 11.7 % — SIGNIFICANT CHANGE UP (ref 10.3–14.5)
WBC # BLD: 17.62 K/UL — HIGH (ref 3.8–10.5)
WBC # FLD AUTO: 17.62 K/UL — HIGH (ref 3.8–10.5)

## 2021-07-10 PROCEDURE — 99285 EMERGENCY DEPT VISIT HI MDM: CPT

## 2021-07-10 RX ORDER — SODIUM CHLORIDE 9 MG/ML
1000 INJECTION INTRAMUSCULAR; INTRAVENOUS; SUBCUTANEOUS ONCE
Refills: 0 | Status: COMPLETED | OUTPATIENT
Start: 2021-07-10 | End: 2021-07-10

## 2021-07-10 RX ORDER — ACETAMINOPHEN 500 MG
650 TABLET ORAL ONCE
Refills: 0 | Status: COMPLETED | OUTPATIENT
Start: 2021-07-10 | End: 2021-07-10

## 2021-07-10 RX ORDER — PIPERACILLIN AND TAZOBACTAM 4; .5 G/20ML; G/20ML
3.38 INJECTION, POWDER, LYOPHILIZED, FOR SOLUTION INTRAVENOUS ONCE
Refills: 0 | Status: COMPLETED | OUTPATIENT
Start: 2021-07-10 | End: 2021-07-10

## 2021-07-10 RX ORDER — VANCOMYCIN HCL 1 G
1000 VIAL (EA) INTRAVENOUS ONCE
Refills: 0 | Status: COMPLETED | OUTPATIENT
Start: 2021-07-10 | End: 2021-07-10

## 2021-07-10 RX ADMIN — PIPERACILLIN AND TAZOBACTAM 200 GRAM(S): 4; .5 INJECTION, POWDER, LYOPHILIZED, FOR SOLUTION INTRAVENOUS at 23:09

## 2021-07-10 RX ADMIN — Medication 650 MILLIGRAM(S): at 23:32

## 2021-07-10 RX ADMIN — PIPERACILLIN AND TAZOBACTAM 3.38 GRAM(S): 4; .5 INJECTION, POWDER, LYOPHILIZED, FOR SOLUTION INTRAVENOUS at 23:40

## 2021-07-10 RX ADMIN — SODIUM CHLORIDE 1000 MILLILITER(S): 9 INJECTION INTRAMUSCULAR; INTRAVENOUS; SUBCUTANEOUS at 23:09

## 2021-07-10 RX ADMIN — Medication 250 MILLIGRAM(S): at 23:41

## 2021-07-10 NOTE — ED PROVIDER NOTE - PROGRESS NOTE DETAILS
Gong: Patient seen and examined. Pt endorsed to me from Dr. Dickson pending CT for possible breast absces.  Noted to have R breast abscess on exam with erythema possibly 2/2 ongoing radiation treatments vs surrounding cellulitis, CT confirmed abscess.  Pending surgery re-eval.

## 2021-07-10 NOTE — ED PROVIDER NOTE - PHYSICAL EXAMINATION
GENERAL: Awake, alert, NAD  HEENT: NC/AT, moist mucous membranes  LUNGS: CTAB, no wheezes or crackles   BREAST: chaperone Dr. Dickson: indurated and fluctuant area overlying lateral upper outer quadrant of R breast, incision appears clean and intact  CARDIAC: tachycardic, regular rhythm, no m/r/g  ABDOMEN: Soft, normal BS, non tender, non distended, no rebound, no guarding  BACK: No midline spinal tenderness, no CVA tenderness  EXT: No edema, no calf tenderness, 2+ DP pulses bilaterally, no deformities.  NEURO: A&Ox3. Moving all extremities.  SKIN: Warm and dry. No rash.  PSYCH: Normal affect.

## 2021-07-10 NOTE — ED PROVIDER NOTE - CLINICAL SUMMARY MEDICAL DECISION MAKING FREE TEXT BOX
55 y/o F with hx of breast cancer s/p lumpectomy and radiation presenting to the ED with breast abscess. Patient febrile and tachycardic on arrival. Concern for sepsis secondary to abscess. Plan for labs, CT chest to evaluate extension of abscess, antibiotics. Margarito Regan, DO PGY3

## 2021-07-10 NOTE — ED ADULT TRIAGE NOTE - CHIEF COMPLAINT QUOTE
"yellow" drainage from right breast where PT had a tumor removed about 2 weeks ago. In triage PT showed a napkin with what appears to be serosanguinous fluid. Pt is actively draining from site. PMH breast CA, HTN, DM,

## 2021-07-10 NOTE — ED PROVIDER NOTE - NS ED ROS FT
CONST: + fevers, + chills  EYES: no pain, no vision changes  ENT: no sore throat, no ear pain, no change in hearing  CV: no chest pain, no leg swelling  RESP: no shortness of breath, no cough  ABD: no abdominal pain, no nausea, no vomiting, no diarrhea  : no dysuria, no flank pain, no hematuria  MSK: no back pain, no extremity pain  NEURO: no headache or additional neurologic complaints  HEME: no easy bleeding  SKIN:  no rash

## 2021-07-10 NOTE — ED PROVIDER NOTE - OBJECTIVE STATEMENT
57 y/o F with hx of breast cancer s/p lumpectomy and radiation presenting to the ED c/o drainage from the left breast for the past day or so. Reports noticing fluid and pus coming from the breast. Also endorses fever and chills. No nausea or vomiting. She has not followed up with her breast surgeon since her lumpectomy was performed 5/17.

## 2021-07-10 NOTE — ED PROVIDER NOTE - ATTENDING CONTRIBUTION TO CARE
56F with hx of right breast CA s/p lumpectomy 5/17/21, currently receiving RTX to right breast p/w fever and right breast pain and drainage. patient states she has noted a lump there for some time with mild tenderness but area stated draining fluid today. there is redness to her breast as well but thought it was a result of the rtx. + fever today.    ***GEN - NAD; well appearing; A+O x3 ***HEAD - NC/AT ***EYES/NOSE - PERRL, EOMI, mucous membranes moist, no discharge ***THROAT: Oral cavity and pharynx normal. No inflammation, swelling, exudate, or lesions.  ***NECK: Neck supple, non-tender without lymphadenopathy, no masses, no thyromegaly.   ***PULMONARY - CTA b/l, symmetric breath sounds. ***CARDIAC -s1s2, RRR, no M,G,R  ***ABDOMEN - +BS, ND, NT, soft, no guarding, no rebound, no masses   ***BACK - no CVA tenderness, Normal  spine ***EXTREMITIES - symmetric pulses, 2+ dp, capillary refill < 2 seconds, no clubbing, no cyanosis, no edema ***SKIN - redness to entire right breast with warmth to right upper outer/axillary area with serous drainage.  ***NEUROLOGIC - alert, CN 2-12 intact    MDM: 56F with right breast infection. sepsis w/u. abdominal, labs, cultures, imaging, breast sx eval.

## 2021-07-11 DIAGNOSIS — N61.1 ABSCESS OF THE BREAST AND NIPPLE: ICD-10-CM

## 2021-07-11 LAB
ALBUMIN SERPL ELPH-MCNC: 3.5 G/DL — SIGNIFICANT CHANGE UP (ref 3.3–5)
ALP SERPL-CCNC: 160 U/L — HIGH (ref 40–120)
ALT FLD-CCNC: 161 U/L — HIGH (ref 4–33)
ANION GAP SERPL CALC-SCNC: 16 MMOL/L — HIGH (ref 7–14)
AST SERPL-CCNC: 101 U/L — HIGH (ref 4–32)
BILIRUB SERPL-MCNC: 0.4 MG/DL — SIGNIFICANT CHANGE UP (ref 0.2–1.2)
BUN SERPL-MCNC: 8 MG/DL — SIGNIFICANT CHANGE UP (ref 7–23)
CALCIUM SERPL-MCNC: 9.4 MG/DL — SIGNIFICANT CHANGE UP (ref 8.4–10.5)
CHLORIDE SERPL-SCNC: 94 MMOL/L — LOW (ref 98–107)
CO2 SERPL-SCNC: 21 MMOL/L — LOW (ref 22–31)
CREAT SERPL-MCNC: 0.59 MG/DL — SIGNIFICANT CHANGE UP (ref 0.5–1.3)
GLUCOSE BLDC GLUCOMTR-MCNC: 110 MG/DL — HIGH (ref 70–99)
GLUCOSE BLDC GLUCOMTR-MCNC: 134 MG/DL — HIGH (ref 70–99)
GLUCOSE BLDC GLUCOMTR-MCNC: 91 MG/DL — SIGNIFICANT CHANGE UP (ref 70–99)
GLUCOSE SERPL-MCNC: 141 MG/DL — HIGH (ref 70–99)
POTASSIUM SERPL-MCNC: 3.5 MMOL/L — SIGNIFICANT CHANGE UP (ref 3.5–5.3)
POTASSIUM SERPL-SCNC: 3.5 MMOL/L — SIGNIFICANT CHANGE UP (ref 3.5–5.3)
PROT SERPL-MCNC: 7.7 G/DL — SIGNIFICANT CHANGE UP (ref 6–8.3)
SARS-COV-2 RNA SPEC QL NAA+PROBE: SIGNIFICANT CHANGE UP
SODIUM SERPL-SCNC: 131 MMOL/L — LOW (ref 135–145)

## 2021-07-11 PROCEDURE — 71260 CT THORAX DX C+: CPT | Mod: 26

## 2021-07-11 RX ORDER — LISINOPRIL 2.5 MG/1
20 TABLET ORAL EVERY 24 HOURS
Refills: 0 | Status: DISCONTINUED | OUTPATIENT
Start: 2021-07-11 | End: 2021-07-12

## 2021-07-11 RX ORDER — SODIUM CHLORIDE 9 MG/ML
1000 INJECTION, SOLUTION INTRAVENOUS
Refills: 0 | Status: DISCONTINUED | OUTPATIENT
Start: 2021-07-11 | End: 2021-07-12

## 2021-07-11 RX ORDER — GLUCAGON INJECTION, SOLUTION 0.5 MG/.1ML
1 INJECTION, SOLUTION SUBCUTANEOUS ONCE
Refills: 0 | Status: DISCONTINUED | OUTPATIENT
Start: 2021-07-11 | End: 2021-07-12

## 2021-07-11 RX ORDER — HYDROMORPHONE HYDROCHLORIDE 2 MG/ML
0.5 INJECTION INTRAMUSCULAR; INTRAVENOUS; SUBCUTANEOUS ONCE
Refills: 0 | Status: COMPLETED | OUTPATIENT
Start: 2021-07-11 | End: 2021-07-11

## 2021-07-11 RX ORDER — DEXTROSE 50 % IN WATER 50 %
25 SYRINGE (ML) INTRAVENOUS ONCE
Refills: 0 | Status: DISCONTINUED | OUTPATIENT
Start: 2021-07-11 | End: 2021-07-12

## 2021-07-11 RX ORDER — VANCOMYCIN HCL 1 G
1000 VIAL (EA) INTRAVENOUS EVERY 12 HOURS
Refills: 0 | Status: DISCONTINUED | OUTPATIENT
Start: 2021-07-11 | End: 2021-07-12

## 2021-07-11 RX ORDER — MORPHINE SULFATE 50 MG/1
2 CAPSULE, EXTENDED RELEASE ORAL ONCE
Refills: 0 | Status: DISCONTINUED | OUTPATIENT
Start: 2021-07-11 | End: 2021-07-12

## 2021-07-11 RX ORDER — INSULIN LISPRO 100/ML
VIAL (ML) SUBCUTANEOUS AT BEDTIME
Refills: 0 | Status: DISCONTINUED | OUTPATIENT
Start: 2021-07-11 | End: 2021-07-12

## 2021-07-11 RX ORDER — PANTOPRAZOLE SODIUM 20 MG/1
40 TABLET, DELAYED RELEASE ORAL
Refills: 0 | Status: DISCONTINUED | OUTPATIENT
Start: 2021-07-11 | End: 2021-07-12

## 2021-07-11 RX ORDER — PIPERACILLIN AND TAZOBACTAM 4; .5 G/20ML; G/20ML
3.38 INJECTION, POWDER, LYOPHILIZED, FOR SOLUTION INTRAVENOUS EVERY 8 HOURS
Refills: 0 | Status: DISCONTINUED | OUTPATIENT
Start: 2021-07-11 | End: 2021-07-12

## 2021-07-11 RX ORDER — LIDOCAINE HYDROCHLORIDE AND EPINEPHRINE 10; 10 MG/ML; UG/ML
20 INJECTION, SOLUTION INFILTRATION; PERINEURAL ONCE
Refills: 0 | Status: DISCONTINUED | OUTPATIENT
Start: 2021-07-11 | End: 2021-07-12

## 2021-07-11 RX ORDER — ACETAMINOPHEN 500 MG
975 TABLET ORAL EVERY 6 HOURS
Refills: 0 | Status: DISCONTINUED | OUTPATIENT
Start: 2021-07-11 | End: 2021-07-12

## 2021-07-11 RX ORDER — DEXTROSE 50 % IN WATER 50 %
12.5 SYRINGE (ML) INTRAVENOUS ONCE
Refills: 0 | Status: DISCONTINUED | OUTPATIENT
Start: 2021-07-11 | End: 2021-07-12

## 2021-07-11 RX ORDER — LISINOPRIL 2.5 MG/1
20 TABLET ORAL EVERY 24 HOURS
Refills: 0 | Status: DISCONTINUED | OUTPATIENT
Start: 2021-07-11 | End: 2021-07-11

## 2021-07-11 RX ORDER — INSULIN LISPRO 100/ML
VIAL (ML) SUBCUTANEOUS
Refills: 0 | Status: DISCONTINUED | OUTPATIENT
Start: 2021-07-11 | End: 2021-07-12

## 2021-07-11 RX ORDER — DEXTROSE 50 % IN WATER 50 %
15 SYRINGE (ML) INTRAVENOUS ONCE
Refills: 0 | Status: DISCONTINUED | OUTPATIENT
Start: 2021-07-11 | End: 2021-07-12

## 2021-07-11 RX ORDER — HYDROCHLOROTHIAZIDE 25 MG
25 TABLET ORAL EVERY 24 HOURS
Refills: 0 | Status: DISCONTINUED | OUTPATIENT
Start: 2021-07-11 | End: 2021-07-12

## 2021-07-11 RX ORDER — ENOXAPARIN SODIUM 100 MG/ML
40 INJECTION SUBCUTANEOUS EVERY 24 HOURS
Refills: 0 | Status: DISCONTINUED | OUTPATIENT
Start: 2021-07-11 | End: 2021-07-12

## 2021-07-11 RX ADMIN — Medication 975 MILLIGRAM(S): at 12:21

## 2021-07-11 RX ADMIN — SODIUM CHLORIDE 1000 MILLILITER(S): 9 INJECTION INTRAMUSCULAR; INTRAVENOUS; SUBCUTANEOUS at 00:10

## 2021-07-11 RX ADMIN — Medication 250 MILLIGRAM(S): at 22:48

## 2021-07-11 RX ADMIN — Medication 250 MILLIGRAM(S): at 12:21

## 2021-07-11 RX ADMIN — Medication 650 MILLIGRAM(S): at 01:30

## 2021-07-11 RX ADMIN — PIPERACILLIN AND TAZOBACTAM 25 GRAM(S): 4; .5 INJECTION, POWDER, LYOPHILIZED, FOR SOLUTION INTRAVENOUS at 17:30

## 2021-07-11 RX ADMIN — Medication 975 MILLIGRAM(S): at 13:21

## 2021-07-11 RX ADMIN — PIPERACILLIN AND TAZOBACTAM 25 GRAM(S): 4; .5 INJECTION, POWDER, LYOPHILIZED, FOR SOLUTION INTRAVENOUS at 08:12

## 2021-07-11 RX ADMIN — Medication 25 MILLIGRAM(S): at 12:26

## 2021-07-11 RX ADMIN — Medication 975 MILLIGRAM(S): at 17:30

## 2021-07-11 RX ADMIN — Medication 1000 MILLIGRAM(S): at 00:45

## 2021-07-11 RX ADMIN — LISINOPRIL 20 MILLIGRAM(S): 2.5 TABLET ORAL at 12:26

## 2021-07-11 NOTE — PROVIDER CONTACT NOTE (MEDICATION) - SITUATION
For Pt Rosio Sepulveda Rm [id number]. Pts R-breast dsg is saturated with drainage and pt is requesting for dsg to be cleaned and redressed. Can dsg be changed?

## 2021-07-11 NOTE — PATIENT PROFILE ADULT - STATED REASON FOR ADMISSION
"I have breast cancer, and I receive radiation. The radiation caused pus to come out of the swelling on my Right breast."

## 2021-07-11 NOTE — ED ADULT NURSE REASSESSMENT NOTE - NS ED NURSE REASSESS COMMENT FT1
Report given to CDU RN. Pt resting in bed comfortably in non acute distress. Respirations even and unlabored. Pt rates pain 4/10 tolerable at this time.

## 2021-07-11 NOTE — H&P ADULT - HISTORY OF PRESENT ILLNESS
57 y/o F with hx of breast cancer s/p lumpectomy and currently undergoing radiation presenting to the ED c/o drainage from the RIGHT breast for the past day or so. Reports noticing fluid and pus coming from the breast. Also endorses fever and chills. No nausea or vomiting. She has not followed up with her breast surgeon since her lumpectomy was performed 5/17.    In ED patient was febrile to 102, Tachy 110, WBC 17.  CT was done showing a fluid collection in the RIGHT axilla which was concerning for abscess.

## 2021-07-11 NOTE — H&P ADULT - NSHPLABSRESULTS_GEN_ALL_CORE
LABS:                          11.8   17.62 )-----------( 430      ( 10 Jul 2021 23:32 )             35.4     07-10    131<L>  |  94<L>  |  8   ----------------------------<  141<H>  3.5   |  21<L>  |  0.59    Ca    9.4      10 Jul 2021 23:32    TPro  7.7  /  Alb  3.5  /  TBili  0.4  /  DBili  x   /  AST  101<H>  /  ALT  161<H>  /  AlkPhos  160<H>  07-10      IMAGING:   EXAM:  CT CHEST IC        PROCEDURE DATE:  Jul 11 2021         INTERPRETATION:  CLINICAL INFORMATION: Drainage from the left breast. Evaluate for breast abscess status post lumpectomy. History of breast cancer.    COMPARISON: Ultrasound of the breast from 5/10/2021.    CONTRAST/COMPLICATIONS:  IV Contrast: Omnipaque 350  46 cc administered   4 cc discarded  Oral Contrast: NONE  Complications: None reported at time of study completion    PROCEDURE:  CT of the Chest was performed.  Sagittal and coronal reformats were performed.    FINDINGS:    LUNGS AND AIRWAYS: Patent central airways.  Bibasilar dependent atelectasis.  PLEURA: No pleural effusion.  MEDIASTINUM AND MOOK: No lymphadenopathy.  VESSELS: Within normal limits.  HEART: Heart size is normal. No pericardial effusion.  CHEST WALL AND LOWER NECK: Heterogeneous left thyroid with internal calcifications measuring 5.1 x 4.4 x 6.5 cm.  No collection within the left breast. Partially visualized right breast.  Right axillary region heterogeneous collection measuring approximately 3.2 x 4.3 x 5.7 cm (2-49).  VISUALIZED UPPER ABDOMEN: Within normal limits.  BONES: Degenerative changes.    IMPRESSION:  Heterogeneous left thyroid with internal calcifications measuring 5.1 x 4.4 x 6.5 cm. Recommend follow-up with nonemergent thyroid ultrasound.  No collection within the left breast. Partially visualized right breast.  Right axillary heterogeneous collection measuring approximately 3.2 x 4.3 x 5.7 cm (2-49). Findings are concerning for an abscess.

## 2021-07-11 NOTE — PROVIDER CONTACT NOTE (MEDICATION) - BACKGROUND
Pt s/p R-Lumpectomy   PMH Diabetes, thyroid nodule, HTN  Pt had incision and drainage of R-breast abscess in ED-

## 2021-07-11 NOTE — ED ADULT NURSE NOTE - OBJECTIVE STATEMENT
56 year old female received to rm 4 AAOx4, Canadian speaking, ambulatory. Patient's son at bedside for translation. Pt PMHx breast CA c/o right breast pain and yellow drainage starting today. Pt had tumor removed from R breast ~2 wks ago. Redness and serosanguinous fluid noted to right lateral breast. Pt febrile 100.7. Pt denies headache, dizziness, cp, sob, n/v/d. Respirations even and unlabored. NSR on cardiac monitor. PIV #20 right AC, labs drawn and sent. Covid swab sent. VS as noted. Medicated pt per MD orders. Bed in lowest position, call bell within reach.

## 2021-07-11 NOTE — H&P ADULT - ASSESSMENT
55 y/o F with hx of breast cancer s/p lumpectomy and currently undergoing radiation presenting to the ED c/o drainage from the RIGHT breast for the past day.  CT concerning for abscess.    Admit to Dr. Milla Mena  Will attempt to needle decompress under US guidance  Continue IV ABx  Continue home meds    Surgical Oncology  D team   [id number]

## 2021-07-11 NOTE — H&P ADULT - ATTENDING COMMENTS
56F s/p R lumpectomy [facility] 5/17/21 now undergoing adjuvant radiation (completed 2 weeks of planned weeks), now with an abscess draining from the axillary incision. Fevers, WBC 17K, and tachycardic on arrival to ED, now improved s/p needle aspiration. Admit for IV antibiotics and observation. Will plan to DC with po antibiotics and close outpatient surveillance. Discussed with RT attending Dr. Merle Avila--will hold off on RT treatments until f/u this week.

## 2021-07-11 NOTE — H&P ADULT - NSHPPHYSICALEXAM_GEN_ALL_CORE
Vital Signs Last 24 Hrs  T(C): 36.3 (11 Jul 2021 03:20), Max: 39 (10 Jul 2021 22:07)  T(F): 97.3 (11 Jul 2021 03:20), Max: 102.2 (10 Jul 2021 22:07)  HR: 69 (11 Jul 2021 03:20) (69 - 110)  BP: 121/76 (11 Jul 2021 03:20) (121/76 - 147/79)  BP(mean): --  RR: 21 (11 Jul 2021 03:20) (20 - 21)  SpO2: 97% (11 Jul 2021 03:20) (97% - 99%)    GEN: Pleasant, appears stated age  HEENT: NCAT, EOMI  CARDS: RRR, Appears well perfused  RESP: CTA B/L, B/L chest wall rise  BREAST: RIGHT Breast erythematous, edematous, Axillary incision with small opening and seropurulent drainage  ABD: NT, ND, No rebound or guarding  EXTREM: WWP, Symmetric

## 2021-07-11 NOTE — PATIENT PROFILE ADULT - HEALTH LITERACY
DATE OF PROCEDURE:  09/25/2019

 

AGE: 83

GENDER: Male

HEIGHT: 67 inches

WEIGHT: 194 pounds

BODY SURFACE AREA: 1.99 m2

 

PATIENT LOCATION: Inpatient, [address] [place] [address], room 4213

 

REFERRING PHYSICIAN: Diego Campos MD

 

INDICATION:   Leg swelling.

 

2-D MEASUREMENTS:

RV: 4.2 cm

LV: 5.0 cm

Septum: 1.6 cm

Posterior wall: 1.44 cm

Aortic root: 4.2 cm

LA:  4.8 cm

LVEF: 60%

 

DOPPLER MEASUREMENTS

AV: 1.56 m/s

LVOT: 0.99 m/s

LVOT diameter:  2.2 cm

MV-E: 95, A: 52, EA ratio 1.8

Early mitral deceleration time: 186 ms

E prime: 6.3, A prime: 5.8, E/E prime ratio: 15

PCWP: 17.3 mmHg

PV: 1.0 m/s

Pulmonary artery acceleration time: 95 ms

RVSP: 40 mmHg

IVC:  2.4 cm

 

COMMENTS

Sinus arrhythmia without intraventricular conduction disturbance.  Rate averaging

70 bpm.

 

Technically challenging study in light of the patient's body habitus, but

diagnostically useful information was still obtained.

 

M-mode and two-dimensional echocardiography was performed with pulsed, continuous

wave, color flow and moderate concentric left ventricle hypertrophy with subtle

straightening of the septum suggestive of right ventricular pressure overload yet

preserved global resting left ventricular systolic function.

 

Moderately dilated left atrium with Doppler evidence of impairment of LV

diastolic function and at least mildly elevated mean left atrial pressure.

 

Mildly dilated right heart chambers with normal wall motion with Doppler evidence

of at least moderate pulmonary hypertension.

 

Mildly dilated inferior vena cava with slightly reduced respiratory collapse in

keeping with elevated central venous pressure.

 

Mild aortic valvular sclerosis without functional abnormality.

 

Mildly dilated aortic root.

 

Moderate mitral annular calcification without inflow tract obstruction and only

very mild insufficiency.

 

Normal appearing tricuspid valve with very mild insufficiency.

 

No apparent intracardiac mass or pericardial effusion.
MTM referral from: [facility] visit (referral by provider)    MTM referral outreach attempt #2 on September 24, 2019 at 1:57 PM      Outcome: Patient not reachable after several attempts, will route to MTM Pharmacist/Provider as an FYI. Thank you for the referral.    Lesa Medrano, MTM Coordinator      
no

## 2021-07-12 ENCOUNTER — TRANSCRIPTION ENCOUNTER (OUTPATIENT)
Age: 56
End: 2021-07-12

## 2021-07-12 ENCOUNTER — NON-APPOINTMENT (OUTPATIENT)
Age: 56
End: 2021-07-12

## 2021-07-12 VITALS
TEMPERATURE: 98 F | HEART RATE: 79 BPM | OXYGEN SATURATION: 98 % | SYSTOLIC BLOOD PRESSURE: 135 MMHG | DIASTOLIC BLOOD PRESSURE: 62 MMHG | RESPIRATION RATE: 18 BRPM

## 2021-07-12 LAB
A1C WITH ESTIMATED AVERAGE GLUCOSE RESULT: 6.5 % — HIGH (ref 4–5.6)
ANION GAP SERPL CALC-SCNC: 14 MMOL/L — SIGNIFICANT CHANGE UP (ref 7–14)
BUN SERPL-MCNC: 9 MG/DL — SIGNIFICANT CHANGE UP (ref 7–23)
CALCIUM SERPL-MCNC: 9 MG/DL — SIGNIFICANT CHANGE UP (ref 8.4–10.5)
CHLORIDE SERPL-SCNC: 103 MMOL/L — SIGNIFICANT CHANGE UP (ref 98–107)
CO2 SERPL-SCNC: 21 MMOL/L — LOW (ref 22–31)
COVID-19 SPIKE DOMAIN AB INTERP: POSITIVE
COVID-19 SPIKE DOMAIN ANTIBODY RESULT: >250 U/ML — HIGH
CREAT SERPL-MCNC: 0.59 MG/DL — SIGNIFICANT CHANGE UP (ref 0.5–1.3)
ESTIMATED AVERAGE GLUCOSE: 140 — SIGNIFICANT CHANGE UP
GLUCOSE BLDC GLUCOMTR-MCNC: 123 MG/DL — HIGH (ref 70–99)
GLUCOSE BLDC GLUCOMTR-MCNC: 129 MG/DL — HIGH (ref 70–99)
GLUCOSE BLDC GLUCOMTR-MCNC: 84 MG/DL — SIGNIFICANT CHANGE UP (ref 70–99)
GLUCOSE SERPL-MCNC: 124 MG/DL — HIGH (ref 70–99)
HCT VFR BLD CALC: 36.5 % — SIGNIFICANT CHANGE UP (ref 34.5–45)
HGB BLD-MCNC: 11.8 G/DL — SIGNIFICANT CHANGE UP (ref 11.5–15.5)
MAGNESIUM SERPL-MCNC: 2 MG/DL — SIGNIFICANT CHANGE UP (ref 1.6–2.6)
MCHC RBC-ENTMCNC: 27.3 PG — SIGNIFICANT CHANGE UP (ref 27–34)
MCHC RBC-ENTMCNC: 32.3 GM/DL — SIGNIFICANT CHANGE UP (ref 32–36)
MCV RBC AUTO: 84.3 FL — SIGNIFICANT CHANGE UP (ref 80–100)
NRBC # BLD: 0 /100 WBCS — SIGNIFICANT CHANGE UP
NRBC # FLD: 0 K/UL — SIGNIFICANT CHANGE UP
PHOSPHATE SERPL-MCNC: 4.2 MG/DL — SIGNIFICANT CHANGE UP (ref 2.5–4.5)
PLATELET # BLD AUTO: 431 K/UL — HIGH (ref 150–400)
POTASSIUM SERPL-MCNC: 4 MMOL/L — SIGNIFICANT CHANGE UP (ref 3.5–5.3)
POTASSIUM SERPL-SCNC: 4 MMOL/L — SIGNIFICANT CHANGE UP (ref 3.5–5.3)
RBC # BLD: 4.33 M/UL — SIGNIFICANT CHANGE UP (ref 3.8–5.2)
RBC # FLD: 11.9 % — SIGNIFICANT CHANGE UP (ref 10.3–14.5)
SARS-COV-2 IGG+IGM SERPL QL IA: >250 U/ML — HIGH
SARS-COV-2 IGG+IGM SERPL QL IA: POSITIVE
SODIUM SERPL-SCNC: 138 MMOL/L — SIGNIFICANT CHANGE UP (ref 135–145)
WBC # BLD: 8.8 K/UL — SIGNIFICANT CHANGE UP (ref 3.8–10.5)
WBC # FLD AUTO: 8.8 K/UL — SIGNIFICANT CHANGE UP (ref 3.8–10.5)

## 2021-07-12 RX ORDER — METOPROLOL TARTRATE 50 MG
2 TABLET ORAL
Qty: 0 | Refills: 0 | DISCHARGE

## 2021-07-12 RX ORDER — ACETAMINOPHEN 500 MG
3 TABLET ORAL
Qty: 0 | Refills: 0 | DISCHARGE
Start: 2021-07-12

## 2021-07-12 RX ADMIN — PIPERACILLIN AND TAZOBACTAM 25 GRAM(S): 4; .5 INJECTION, POWDER, LYOPHILIZED, FOR SOLUTION INTRAVENOUS at 00:04

## 2021-07-12 RX ADMIN — Medication 25 MILLIGRAM(S): at 12:25

## 2021-07-12 RX ADMIN — PANTOPRAZOLE SODIUM 40 MILLIGRAM(S): 20 TABLET, DELAYED RELEASE ORAL at 05:28

## 2021-07-12 RX ADMIN — ENOXAPARIN SODIUM 40 MILLIGRAM(S): 100 INJECTION SUBCUTANEOUS at 12:25

## 2021-07-12 RX ADMIN — LISINOPRIL 20 MILLIGRAM(S): 2.5 TABLET ORAL at 12:25

## 2021-07-12 RX ADMIN — PIPERACILLIN AND TAZOBACTAM 25 GRAM(S): 4; .5 INJECTION, POWDER, LYOPHILIZED, FOR SOLUTION INTRAVENOUS at 06:15

## 2021-07-12 RX ADMIN — Medication 1 TABLET(S): at 17:24

## 2021-07-12 NOTE — DISCHARGE NOTE PROVIDER - NSDCFUADDINST_GEN_ALL_CORE_FT
WOUND CARE: ***__________***  BATHING: Please do not submerge wound underwater.  You may shower and / or sponge bathe.  ACTIVITY: No heavy lifting or straining.  Otherwise, you may return to your usual level of physical activity.  If you are taking narcotic pain medication (such as Oxycodone or Percocet) DO NOT drive a car, operate machinery or make important decisions.  DIET: Resume a regular, consistent carbohydrate diet as tolerated.  NOTIFY YOUR SURGEON IF: You have any bleeding that does not stop, any pus draining from your wound(s), any fever (over 100.4 F) or chills, persistent nausea / vomiting, persistent diarrhea or if your pain is not controlled on your discharge pain medications.  FOLLOW-UP: Please follow up with your primary care physician in one week regarding your hospitalization.  Please follow-up with your surgeon, Dr. Mena within 7 days following discharge.  Please call [phone] to schedule an appointment.  WOUND CARE: Keep incision clean and dry. Do not apply lotions or powders.   BATHING: Please do not submerge wound underwater.  You may shower and / or sponge bathe.  ACTIVITY: No heavy lifting or straining.  Otherwise, you may return to your usual level of physical activity.  If you are taking narcotic pain medication (such as Oxycodone or Percocet) DO NOT drive a car, operate machinery or make important decisions.  DIET: Resume a regular, consistent carbohydrate diet as tolerated.  NOTIFY YOUR SURGEON IF: You have any bleeding that does not stop, any pus draining from your wound(s), any fever (over 100.4 F) or chills, persistent nausea / vomiting, persistent diarrhea or if your pain is not controlled on your discharge pain medications.  FOLLOW-UP: Please follow up with your primary care physician in one week regarding your hospitalization.

## 2021-07-12 NOTE — DISCHARGE NOTE PROVIDER - NSDCMRMEDTOKEN_GEN_ALL_CORE_FT
hydroCHLOROthiazide 25 mg oral tablet: 1 tab(s) orally once a day  lisinopril 20 mg oral tablet: 1 tab(s) orally once a day  metFORMIN 500 mg oral tablet: 1 tab(s) orally once a day  Metoprolol Tartrate 50 mg oral tablet: 2 tab(s) orally once a day  omeprazole 40 mg oral delayed release capsule: 1 cap(s) orally once a day  oxyCODONE 5 mg oral capsule: 1 cap(s) orally every 6 hours as needed for moderate to severe pain MDD:4  potassium chloride 10 mEq oral capsule, extended release: 2 cap(s) orally once a day   acetaminophen 325 mg oral tablet: 3 tab(s) orally every 6 hours  amoxicillin-clavulanate 875 mg-125 mg oral tablet: 1 tab(s) orally every 12 hours  hydroCHLOROthiazide 25 mg oral tablet: 1 tab(s) orally once a day  lisinopril 20 mg oral tablet: 1 tab(s) orally once a day  metFORMIN 500 mg oral tablet: 1 tab(s) orally once a day  omeprazole 40 mg oral delayed release capsule: 1 cap(s) orally once a day  potassium chloride 10 mEq oral capsule, extended release: 2 cap(s) orally once a day

## 2021-07-12 NOTE — DISCHARGE NOTE NURSING/CASE MANAGEMENT/SOCIAL WORK - NSDCFUADDAPPT_GEN_ALL_CORE_FT
**Heterogeneous left thyroid with internal calcifications measuring 5.1 x 4.4 x 6.5 cm incidentally found on CT scan.  Please make an appointment with your primary care physician to schedule a thyroid ultrasound for further evaluation.      Follow-up with Dr. Mena on Wednesday (7/14). Call to schedule an appointment.

## 2021-07-12 NOTE — DISCHARGE NOTE NURSING/CASE MANAGEMENT/SOCIAL WORK - NSDCPNINST_GEN_ALL_CORE
Please return to ED if you develop any nausea, vomiting, diarrhea or temp of 100.4 and greater. Notify your provider if you develop any pus like drainage from incision sites.

## 2021-07-12 NOTE — DISCHARGE NOTE NURSING/CASE MANAGEMENT/SOCIAL WORK - PATIENT PORTAL LINK FT
You can access the FollowMyHealth Patient Portal offered by [facility] by registering at the following website: [email or web address]. By joining [email or web address]’s FollowMyHealth portal, you will also be able to view your health information using other applications (apps) compatible with our system.

## 2021-07-12 NOTE — PROGRESS NOTE ADULT - ASSESSMENT
57 y/o F with hx of breast cancer s/p lumpectomy and currently undergoing radiation presenting to the ED c/o drainage from the R breast. CT concerning for abscess. Status improved s/p needle aspiration and administration of IV antibiotics.    - F/u WBC  - Discharge home on Augmentin if WBC count improved  - F/u w/ Dr. Mena on Wednesday  - Hold off on TR treatments until f/u 57 y/o F with hx of breast cancer s/p lumpectomy and currently undergoing radiation presenting to the ED c/o drainage from the R breast. CT concerning for abscess. Status improved s/p needle aspiration and administration of IV antibiotics.    - F/u WBC  - Discharge home on Augmentin if WBC count improved  - IV site pain likely due to extravasation; place at another site  - F/u w/ Dr. Mena on Wednesday  - Hold off on TR treatments until f/u

## 2021-07-12 NOTE — DISCHARGE NOTE PROVIDER - HOSPITAL COURSE
Patient is a 56 year old female with a PMHx of breast cancer (S/P lumpectomy and currently undergoing radiation) who presented to the ED with drainage from the right breast.    On 7/11 CT Chest performed showing no collection within the left breast.  Partially visualized right breast.  Right axillary heterogeneous collection measuring approximately 3.2 x 4.3 x 5.7 cm (2-49).  Findings are concerning for an abscess.  I&D performed with 10cc of purulent fluid aspirated.  Patient was started on IV Vancomycin and IV Zosyn.    At the time of discharge, the patient was hemodynamically stable, was tolerating PO diet, was voiding urine and passing stool.  She was ambulating and was comfortable with adequate pain control.  The patient was instructed to follow up with Dr. Mena within 1 week after discharge from the hospital.  The patient / family felt comfortable with discharge.  The patient had no other issues.    Per attending, patient deemed medically stable and cleared for discharge at this time.      ***COMPLETE UP TO 7/11*** Patient is a 56 year old female with a PMHx of breast cancer (S/P lumpectomy and currently undergoing radiation) who presented to the ED with drainage from the right breast.    On 7/11 CT Chest performed showing heterogeneous left thyroid with internal calcifications measuring 5.1 x 4.4 x 6.5 cm.  No collection within the left breast.  Partially visualized right breast.  Right axillary heterogeneous collection measuring approximately 3.2 x 4.3 x 5.7 cm (2-49).  Findings are concerning for an abscess.  I&D performed with 10cc of purulent fluid aspirated.  Patient was started on IV Vancomycin and IV Zosyn.    At the time of discharge, the patient was hemodynamically stable, was tolerating PO diet, was voiding urine and passing stool.  She was ambulating and was comfortable with adequate pain control.  The patient was instructed to follow up with Dr. Mena within 1 week after discharge from the hospital.  The patient / family felt comfortable with discharge.  The patient had no other issues.    Per attending, patient deemed medically stable and cleared for discharge at this time.      ***COMPLETE UP TO 7/11*** Patient is a 56 year old female with a PMHx of breast cancer (S/P lumpectomy and currently undergoing radiation) who presented to the ED with drainage from the right breast.    On 7/11 CT Chest performed showing heterogeneous left thyroid with internal calcifications measuring 5.1 x 4.4 x 6.5 cm.  No collection within the left breast.  Partially visualized right breast.  Right axillary heterogeneous collection measuring approximately 3.2 x 4.3 x 5.7 cm (2-49).  Findings are concerning for an abscess.  I&D performed with 10cc of purulent fluid aspirated.  Patient was started on IV Vancomycin and IV Zosyn.    At the time of discharge, the patient was hemodynamically stable, was tolerating PO diet, was voiding urine and passing stool.  She was ambulating and was comfortable with adequate pain control.  The patient was instructed to follow up with Dr. Mena within 1 week after discharge from the hospital.  The patient / family felt comfortable with discharge.  The patient had no other issues.    Per attending, patient deemed medically stable and cleared for discharge at this time.

## 2021-07-12 NOTE — DISCHARGE NOTE PROVIDER - NSDCCPCAREPLAN_GEN_ALL_CORE_FT
PRINCIPAL DISCHARGE DIAGNOSIS  Diagnosis: Breast abscess  Assessment and Plan of Treatment:        PRINCIPAL DISCHARGE DIAGNOSIS  Diagnosis: Breast abscess  Assessment and Plan of Treatment:       SECONDARY DISCHARGE DIAGNOSES  Diagnosis: Hypertension  Assessment and Plan of Treatment: One of your home medications (Metoprolol) was NOT restarted while you were in the hospital as your blood pressure was well controlled without it.   Please follow-up with your PCP to have your blood pressure checked in 5-7 days following discharge and discuss restarting.

## 2021-07-12 NOTE — DISCHARGE NOTE PROVIDER - NSDCFUADDAPPT_GEN_ALL_CORE_FT
Heterogeneous left thyroid with internal calcifications measuring 5.1 x 4.4 x 6.5 cm incidentally found on CT scan.  Please make an appointment with your primary care physician to schedule a thyroid ultrasound for further evaluation. **Heterogeneous left thyroid with internal calcifications measuring 5.1 x 4.4 x 6.5 cm incidentally found on CT scan.  Please make an appointment with your primary care physician to schedule a thyroid ultrasound for further evaluation.      Follow-up with Dr. Mena on Wednesday (7/14). Call to schedule an appointment.

## 2021-07-12 NOTE — PROGRESS NOTE ADULT - SUBJECTIVE AND OBJECTIVE BOX
SUBJECTIVE: Pt seen this morning. Denies pain at site of breast surgery, nausea, vomiting. Has been passing gas and had a BM today. Complains of pain at site of IV.      Vital Signs Last 24 Hrs  T(C): 36.8 (12 Jul 2021 09:34), Max: 36.8 (11 Jul 2021 17:14)  T(F): 98.2 (12 Jul 2021 09:34), Max: 98.3 (11 Jul 2021 17:14)  HR: 79 (12 Jul 2021 09:34) (69 - 84)  BP: 135/62 (12 Jul 2021 09:34) (105/71 - 135/62)  BP(mean): 75 (11 Jul 2021 20:51) (75 - 78)  RR: 18 (12 Jul 2021 09:34) (17 - 18)  SpO2: 98% (12 Jul 2021 09:34) (94% - 100%)      Pain: [ ] YES [ ] NO  Pain (0-10):              Pain Control Adequate: [ ] YES [ ] NO  SOB: [ ]YES [ ] NO  Chest Discomfort: [ ] YES [ ] NO    Nausea: [ ] YES [ ] NO           Vomiting: [ ] YES [ ] NO  Flatus: [ ] YES [ ] NO             Bowel Movement: [ ] YES [ ] NO     Void: [ ]YES [ ]No    Duckworth:  NGT:  CABRERA:    General Appearance: Appears well, NAD  Neck: Supple  Chest: Equal expansion bilaterally, equal breath sounds. Dressing saturated. No tenderness of incision.  CV: Pulse regular presently  Abdomen: Soft, nontender to palpation.  Extremities: Grossly symmetric, SCD's in place     I&O's Summary    11 Jul 2021 07:01  -  12 Jul 2021 07:00  --------------------------------------------------------  IN: 840 mL / OUT: 0 mL / NET: 840 mL    12 Jul 2021 07:01  -  12 Jul 2021 14:03  --------------------------------------------------------  IN: 200 mL / OUT: 0 mL / NET: 200 mL      I&O's Detail    11 Jul 2021 07:01  -  12 Jul 2021 07:00  --------------------------------------------------------  IN:    IV PiggyBack: 300 mL    Oral Fluid: 540 mL  Total IN: 840 mL    OUT:    Voided (mL): 0 mL  Total OUT: 0 mL    Total NET: 840 mL      12 Jul 2021 07:01  -  12 Jul 2021 14:03  --------------------------------------------------------  IN:    Oral Fluid: 200 mL  Total IN: 200 mL    OUT:  Total OUT: 0 mL    Total NET: 200 mL          MEDICATIONS  (STANDING):  acetaminophen   Tablet .. 975 milliGRAM(s) Oral every 6 hours  amoxicillin  875 milliGRAM(s)/clavulanate 1 Tablet(s) Oral every 12 hours  dextrose 40% Gel 15 Gram(s) Oral once  dextrose 50% Injectable 25 Gram(s) IV Push once  dextrose 50% Injectable 12.5 Gram(s) IV Push once  dextrose 50% Injectable 25 Gram(s) IV Push once  enoxaparin Injectable 40 milliGRAM(s) SubCutaneous every 24 hours  glucagon  Injectable 1 milliGRAM(s) IntraMuscular once  hydrochlorothiazide 25 milliGRAM(s) Oral every 24 hours  insulin lispro (ADMELOG) corrective regimen sliding scale   SubCutaneous three times a day before meals  insulin lispro (ADMELOG) corrective regimen sliding scale   SubCutaneous at bedtime  lisinopril 20 milliGRAM(s) Oral every 24 hours  pantoprazole    Tablet 40 milliGRAM(s) Oral before breakfast    MEDICATIONS  (PRN):      LABS:                        11.8   8.80  )-----------( 431      ( 12 Jul 2021 06:59 )             36.5     07-12    138  |  103  |  9   ----------------------------<  124<H>  4.0   |  21<L>  |  0.59    Ca    9.0      12 Jul 2021 06:59  Phos  4.2     07-12  Mg     2.00     07-12    TPro  7.7  /  Alb  3.5  /  TBili  0.4  /  DBili  x   /  AST  101<H>  /  ALT  161<H>  /  AlkPhos  160<H>  07-10 SUBJECTIVE: Pt seen this morning. Denies pain at site of breast surgery, nausea, vomiting. Has been passing gas and had a BM today. Complains of pain and swelling at site of IV.      Vital Signs Last 24 Hrs  T(C): 36.8 (12 Jul 2021 09:34), Max: 36.8 (11 Jul 2021 17:14)  T(F): 98.2 (12 Jul 2021 09:34), Max: 98.3 (11 Jul 2021 17:14)  HR: 79 (12 Jul 2021 09:34) (69 - 84)  BP: 135/62 (12 Jul 2021 09:34) (105/71 - 135/62)  BP(mean): 75 (11 Jul 2021 20:51) (75 - 78)  RR: 18 (12 Jul 2021 09:34) (17 - 18)  SpO2: 98% (12 Jul 2021 09:34) (94% - 100%)      Pain: [ ] YES [ ] NO  Pain (0-10):              Pain Control Adequate: [ ] YES [ ] NO  SOB: [ ]YES [ ] NO  Chest Discomfort: [ ] YES [ ] NO    Nausea: [ ] YES [ ] NO           Vomiting: [ ] YES [ ] NO  Flatus: [ ] YES [ ] NO             Bowel Movement: [ ] YES [ ] NO     Void: [ ]YES [ ]No    Duckworth:  NGT:  CABRERA:    General Appearance: Appears well, NAD  Neck: Supple  Chest: Equal expansion bilaterally, equal breath sounds. Dressing saturated. No tenderness of incision.  CV: Pulse regular presently  Abdomen: Soft, nontender to palpation.  Extremities: Grossly symmetric, SCD's in place     I&O's Summary    11 Jul 2021 07:01  -  12 Jul 2021 07:00  --------------------------------------------------------  IN: 840 mL / OUT: 0 mL / NET: 840 mL    12 Jul 2021 07:01  -  12 Jul 2021 14:03  --------------------------------------------------------  IN: 200 mL / OUT: 0 mL / NET: 200 mL      I&O's Detail    11 Jul 2021 07:01  -  12 Jul 2021 07:00  --------------------------------------------------------  IN:    IV PiggyBack: 300 mL    Oral Fluid: 540 mL  Total IN: 840 mL    OUT:    Voided (mL): 0 mL  Total OUT: 0 mL    Total NET: 840 mL      12 Jul 2021 07:01  -  12 Jul 2021 14:03  --------------------------------------------------------  IN:    Oral Fluid: 200 mL  Total IN: 200 mL    OUT:  Total OUT: 0 mL    Total NET: 200 mL          MEDICATIONS  (STANDING):  acetaminophen   Tablet .. 975 milliGRAM(s) Oral every 6 hours  amoxicillin  875 milliGRAM(s)/clavulanate 1 Tablet(s) Oral every 12 hours  dextrose 40% Gel 15 Gram(s) Oral once  dextrose 50% Injectable 25 Gram(s) IV Push once  dextrose 50% Injectable 12.5 Gram(s) IV Push once  dextrose 50% Injectable 25 Gram(s) IV Push once  enoxaparin Injectable 40 milliGRAM(s) SubCutaneous every 24 hours  glucagon  Injectable 1 milliGRAM(s) IntraMuscular once  hydrochlorothiazide 25 milliGRAM(s) Oral every 24 hours  insulin lispro (ADMELOG) corrective regimen sliding scale   SubCutaneous three times a day before meals  insulin lispro (ADMELOG) corrective regimen sliding scale   SubCutaneous at bedtime  lisinopril 20 milliGRAM(s) Oral every 24 hours  pantoprazole    Tablet 40 milliGRAM(s) Oral before breakfast    MEDICATIONS  (PRN):      LABS:                        11.8   8.80  )-----------( 431      ( 12 Jul 2021 06:59 )             36.5     07-12    138  |  103  |  9   ----------------------------<  124<H>  4.0   |  21<L>  |  0.59    Ca    9.0      12 Jul 2021 06:59  Phos  4.2     07-12  Mg     2.00     07-12    TPro  7.7  /  Alb  3.5  /  TBili  0.4  /  DBili  x   /  AST  101<H>  /  ALT  161<H>  /  AlkPhos  160<H>  07-10

## 2021-07-12 NOTE — DISCHARGE NOTE PROVIDER - CARE PROVIDER_API CALL
Milla Mena)  Surgery  [phone] [place] [address]  Phone: [phone]  Fax: [phone]  Follow Up Time: 1 week

## 2021-07-13 LAB
-  AMPICILLIN/SULBACTAM: SIGNIFICANT CHANGE UP
-  CEFAZOLIN: SIGNIFICANT CHANGE UP
-  CLINDAMYCIN: SIGNIFICANT CHANGE UP
-  ERYTHROMYCIN: SIGNIFICANT CHANGE UP
-  GENTAMICIN: SIGNIFICANT CHANGE UP
-  OXACILLIN: SIGNIFICANT CHANGE UP
-  PENICILLIN: SIGNIFICANT CHANGE UP
-  RIFAMPIN: SIGNIFICANT CHANGE UP
-  TETRACYCLINE: SIGNIFICANT CHANGE UP
-  TRIMETHOPRIM/SULFAMETHOXAZOLE: SIGNIFICANT CHANGE UP
-  VANCOMYCIN: SIGNIFICANT CHANGE UP
METHOD TYPE: SIGNIFICANT CHANGE UP

## 2021-07-14 ENCOUNTER — NON-APPOINTMENT (OUTPATIENT)
Age: 56
End: 2021-07-14

## 2021-07-14 ENCOUNTER — APPOINTMENT (OUTPATIENT)
Dept: SURGICAL ONCOLOGY | Facility: CLINIC | Age: 56
End: 2021-07-14
Payer: MEDICAID

## 2021-07-14 VITALS
HEIGHT: 65.75 IN | HEART RATE: 80 BPM | BODY MASS INDEX: 30.58 KG/M2 | RESPIRATION RATE: 16 BRPM | WEIGHT: 188 LBS | SYSTOLIC BLOOD PRESSURE: 121 MMHG | OXYGEN SATURATION: 99 % | TEMPERATURE: 98.1 F | DIASTOLIC BLOOD PRESSURE: 77 MMHG

## 2021-07-14 VITALS
DIASTOLIC BLOOD PRESSURE: 66 MMHG | SYSTOLIC BLOOD PRESSURE: 127 MMHG | OXYGEN SATURATION: 100 % | TEMPERATURE: 97.9 F | HEART RATE: 93 BPM | RESPIRATION RATE: 18 BRPM

## 2021-07-14 PROCEDURE — 99024 POSTOP FOLLOW-UP VISIT: CPT

## 2021-07-14 NOTE — HISTORY OF PRESENT ILLNESS
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 Diagnostic right mammo 3/24/21 showed R upper outer breast mass 1.4 x 1.6 cm with heterogeneous microcalcifications. Sono:mass at 10:00: 1.5 x 1 x 0.9 cm irregular hypoechoic mass. \par \par 3/31/2021: Right breast biopsy 10:00: invasive moderately diff ductal carcinoma, DCIS, intermediate grade, focal. ER >98%, NV >98%, HER2 negative\par \par 2021 MRI: concordant with known malignancy right breast. No suspicious findings in left breast\par \par 2021:  R lumpectomy and  SLNB revealed 1.5 cm IDC, grade 2; 7 mm DCIS, low to intermediate grade. Margins negative(Fragmented nature of the final margin specimens preclude assessment of the distance). 0/3 SLNB. ER :  Positive, > 98%, PgR :  Positive, > 98%, HER2: Negative. pT1cN0, under care of Dr Mena.\par \par 21: Saw Dr. Mena. Referred to med/rad oncology. Oncotype DX pending\par \par Menarche:12\par \par FHx: brother leukemia and maternal aunt pancreatic cancer, both  \par \par Today:\par : Ms. Sepulveda presents 2021 for on treatment visit. She has completed 1855/5240 cgy of radiation to the right partial breast. She was admitted 7/10- with drainage from right breast axilla and fevers. CT was concerning for abcess. She underwent I and D in the hospital, treated with vancomycin and zosyn.Saw Dr. Mena today and 2-3 cc aspirated, will see her again . sent home with augmentin until . \par \par \par \par

## 2021-07-14 NOTE — PROCEDURE
[FreeTextEntry1] : Needle Aspiration [FreeTextEntry2] : abscess [FreeTextEntry3] : After cleansing the skin with betadine, the skin was anesthetized with ethyl chloride then 4 cc of 1% lidocaine with epi. An 18g needle was inserted into the abscess and approx 2-3 cc of serosanguinous fluid was aspirated. The collection was seen to be slightly smaller, but still present. A clean dressing was applied and the patient tolerated the procedure well. [___ ml Inj] : [unfilled] ~Uml [1%] : 1% [With Epi] : with epinephrine [Betadine] : using betadine

## 2021-07-14 NOTE — PHYSICAL EXAM
[General Appearance - Alert] : alert [General Appearance - In No Acute Distress] : in no acute distress [Sclera] : the sclera and conjunctiva were normal [] : no respiratory distress [Respiration, Rhythm And Depth] : normal respiratory rhythm and effort [Auscultation Breath Sounds / Voice Sounds] : lungs were clear to auscultation bilaterally [Edema] : no peripheral edema present [No UE Edema] : there is no upper extremity edema [Cervical Lymph Nodes Enlarged Posterior Bilaterally] : posterior cervical [Cervical Lymph Nodes Enlarged Anterior Bilaterally] : anterior cervical [Supraclavicular Lymph Nodes Enlarged Bilaterally] : supraclavicular [Axillary Lymph Nodes Enlarged Bilaterally] : axillary [Range of Motion to Joints] : range of motion to joints [Skin Color & Pigmentation] : normal skin color and pigmentation [No Focal Deficits] : no focal deficits [Normal] : oriented to person, place and time, the affect was normal, the mood was normal and not anxious [Oriented To Time, Place, And Person] : oriented to person, place, and time [de-identified] : right axilla incision with slight redness

## 2021-07-14 NOTE — REVIEW OF SYSTEMS
[Dry Eyes] : dryness of the eyes [Anxiety] : anxiety [Negative] : Heme/Lymph [Eye Pain] : no eye pain [Loss of Hearing] : no loss of hearing [Chest Pain] : no chest pain [Palpitations] : no palpitations [Shortness Of Breath] : no shortness of breath [Cough] : no cough [Urinary Frequency] : no change in urinary frequency [Vaginal Discharge] : no vaginal discharge [Dysmenorrhea/Abn Vaginal Bleeding] : no dysmenorrhea/abnormal vaginal bleeding [Joint Pain] : no joint pain [Gait Disturbance] : no gait disturbance [Skin Rash] : no skin rash [Dizziness] : no dizziness [de-identified] : healed right breast/axillary surgical incisions, some drainage today

## 2021-07-14 NOTE — ASSESSMENT
[FreeTextEntry1] : The patient is a 56 year old female with R breast IDC, moderately differentiated, DCIS intermediate grade, ER/IN positive, HER2 negative s/p R lumpectomy SLNB 5/17/2021, 1.5 cm IDC, grade 2; 7 mm DCIS, low to intermediate grade. Margins negative. 0/3 SLNB. pT1cN0. Currently undergoing radiation (week 2/4) with a R axillary incision abscess s/p aspiration. On Augmentin, much improved\par \par US shows still a residual heterogeneous collection. Aspiration attempted with 2-3 cc of serosanguinous drainage.\par \par Plan:\par  - continue po antibiotics\par  - RTO Friday

## 2021-07-14 NOTE — HISTORY OF PRESENT ILLNESS
[FreeTextEntry1] : The patient is a 56 year old female with R breast IDC, moderately differentiated, DCIS intermediate grade, ER/HI positive, HER2 negative s/p R lumpectomy SLNB 5/17/2021, 1.5 cm IDC, grade 2; 7 mm DCIS, low to intermediate grade. Margins negative. 0/3 SLNB. pT1cN0. Currently undergoing radiation (week 2/4). Pt presented to the ED 7/11/21 with drainage from the right breast, and fevers.\par \par In the ED, patient was noted to be tachycardic, febrile, WBC 17K. 7/11/21 CT Chest performed Rght axillary heterogeneous collection measuring approximately 3.2 x 4.3 x 5.7 cm.  I&D performed with 10cc of purulent fluid aspirated.  Patient was started on IV Vancomycin and IV Zosyn and admitted for observation. She improved clinically and was discharged on HD2 with po Augmentin.\par \par Interval history:\par Pt is diligently taking her abx. She is continuing her warm compresses. The incision continues to drain, is less copious than before. Pain is much improved. Denies fevers/chills.

## 2021-07-16 ENCOUNTER — APPOINTMENT (OUTPATIENT)
Dept: SURGICAL ONCOLOGY | Facility: CLINIC | Age: 56
End: 2021-07-16
Payer: MEDICAID

## 2021-07-16 VITALS
BODY MASS INDEX: 30.58 KG/M2 | RESPIRATION RATE: 18 BRPM | HEIGHT: 65.75 IN | SYSTOLIC BLOOD PRESSURE: 148 MMHG | OXYGEN SATURATION: 98 % | DIASTOLIC BLOOD PRESSURE: 81 MMHG | WEIGHT: 188 LBS | HEART RATE: 94 BPM

## 2021-07-16 LAB
CULTURE RESULTS: SIGNIFICANT CHANGE UP
ORGANISM # SPEC MICROSCOPIC CNT: SIGNIFICANT CHANGE UP
ORGANISM # SPEC MICROSCOPIC CNT: SIGNIFICANT CHANGE UP
SPECIMEN SOURCE: SIGNIFICANT CHANGE UP

## 2021-07-16 PROCEDURE — 77427 RADIATION TX MANAGEMENT X5: CPT

## 2021-07-16 PROCEDURE — 99024 POSTOP FOLLOW-UP VISIT: CPT

## 2021-07-16 NOTE — PHYSICAL EXAM
[Normocephalic] : normocephalic [Atraumatic] : atraumatic [EOMI] : extra ocular movement intact [PERRL] : pupils equal, round and reactive to light [Sclera nonicteric] : sclera nonicteric [de-identified] : non-labored respirations  [de-identified] : Axillary incision slightly improved. Induration noted, no fluctuance. Still draining from anterior portion of incision. Posteriorly with small superficial fluctuance, lanced open with 18g needle. US guided aspiration again attempted today with minimal output.

## 2021-07-16 NOTE — ASSESSMENT
[FreeTextEntry1] : The patient is a 56 year old female with R breast IDC, moderately differentiated, DCIS intermediate grade, ER/IA positive, HER2 negative s/p R lumpectomy SLNB 5/17/2021, 1.5 cm IDC, grade 2; 7 mm DCIS, low to intermediate grade. Margins negative. 0/3 SLNB. pT1cN0. Currently undergoing radiation (week 2/4) with a R axillary incision abscess s/p aspiration. On Augmentin, much improved\par \par US shows still a residual heterogeneous collection. Aspiration attempted with 1 cc of serosanguinous drainage.\par \par Plan:\par  - continue po antibiotics\par  - continue warm compresses\par  - RTO Next wednesday\par

## 2021-07-16 NOTE — HISTORY OF PRESENT ILLNESS
[FreeTextEntry1] : The patient is a 56 year old female with R breast IDC, moderately differentiated, DCIS intermediate grade, ER/OR positive, HER2 negative s/p R lumpectomy SLNB 5/17/2021, 1.5 cm IDC, grade 2; 7 mm DCIS, low to intermediate grade. Margins negative. 0/3 SLNB. pT1cN0. Currently undergoing radiation (week 2/4). Pt presented to the ED 7/11/21 with drainage from the right breast, and fevers s/p aspiration of 10 cc pus. Still on antibiotics.\par \par  Shahana [id number].\par \par Interval history:\par Pt is still diligently taking her abx. She is continuing her warm compresses. The incision continues to drain, is less copious than before. Pain is much improved. Denies fevers/chills. Saw Dr. Avila and will resume RT on Monday.

## 2021-07-20 ENCOUNTER — NON-APPOINTMENT (OUTPATIENT)
Age: 56
End: 2021-07-20

## 2021-07-20 PROCEDURE — 77280 THER RAD SIMULAJ FIELD SMPL: CPT | Mod: 26

## 2021-07-20 NOTE — REVIEW OF SYSTEMS
[Fatigue: Grade 1 - Fatigue relieved by rest] : Fatigue: Grade 1 - Fatigue relieved by rest [Skin Hyperpigmentation: Grade 1 - Hyperpigmentation covering <10% BSA; no psychosocial impact] : Skin Hyperpigmentation: Grade 1 - Hyperpigmentation covering <10% BSA; no psychosocial impact [Dermatitis Radiation: Grade 2 - Moderate to brisk erythema; patchy moist desquamation, mostly confined to skin folds and creases; moderate edema] : Dermatitis Radiation: Grade 2 - Moderate to brisk erythema; patchy moist desquamation, mostly confined to skin folds and creases; moderate edema

## 2021-07-20 NOTE — VITALS
[Maximal Pain Intensity: 7/10] : 7/10 [NoTreatment Scheduled] : no treatment scheduled [OTC] : OTC [80: Normal activity with effort; some signs or symptoms of disease.] : 80: Normal activity with effort; some signs or symptoms of disease.

## 2021-07-21 ENCOUNTER — APPOINTMENT (OUTPATIENT)
Dept: SURGICAL ONCOLOGY | Facility: CLINIC | Age: 56
End: 2021-07-21
Payer: MEDICAID

## 2021-07-21 VITALS
HEIGHT: 65.75 IN | WEIGHT: 188 LBS | RESPIRATION RATE: 16 BRPM | DIASTOLIC BLOOD PRESSURE: 82 MMHG | OXYGEN SATURATION: 98 % | BODY MASS INDEX: 30.58 KG/M2 | HEART RATE: 94 BPM | SYSTOLIC BLOOD PRESSURE: 138 MMHG

## 2021-07-21 PROCEDURE — 99024 POSTOP FOLLOW-UP VISIT: CPT

## 2021-07-22 NOTE — ASSESSMENT
[FreeTextEntry1] : The patient is a 56 year old female with R breast IDC, moderately differentiated, DCIS intermediate grade, ER/OH positive, HER2 negative s/p R lumpectomy SLNB 5/17/2021, 1.5 cm IDC, grade 2; 7 mm DCIS, low to intermediate grade. Margins negative. 0/3 SLNB. pT1cN0. Currently undergoing radiation (week 2/4) with a R axillary incision abscess s/p aspiration. On Augmentin, improved but not yet resolved.\par \par \par Plan:\par  - continue po antibiotics\par  - continue warm compresses\par  - RTO Friday 7/23 for repeat exam and aspiration

## 2021-07-22 NOTE — PHYSICAL EXAM
[Normocephalic] : normocephalic [Atraumatic] : atraumatic [EOMI] : extra ocular movement intact [PERRL] : pupils equal, round and reactive to light [Sclera nonicteric] : sclera nonicteric [de-identified] : non-labored respirations  [de-identified] : Incision indurated with 2 superficial fluid-filled vesicles. Surrounding erythema slightly increased from before. A large irregularly marginated collection seen on US.

## 2021-07-22 NOTE — HISTORY OF PRESENT ILLNESS
[FreeTextEntry1] : The patient is a 56 year old female with R breast IDC, moderately differentiated, DCIS intermediate grade, ER/AR positive, HER2 negative s/p R lumpectomy SLNB 5/17/2021, 1.5 cm IDC, grade 2; 7 mm DCIS, low to intermediate grade. Margins negative. 0/3 SLNB. pT1cN0. Currently undergoing radiation (week 2/4). Pt presented to the ED 7/11/21 with drainage from the right breast, and fevers s/p aspiration of 10 cc pus. Still on antibiotics.\par \par  declined. Son Lisa available by phone to translate.\par \par Interval history:\par Pt was prescribed an additional course of antibiotics. She is continuing her warm compresses three times daily. The incision continues to drain, is less copious than before. Pain is much improved. Denies fevers/chills, but notes temp up to 99 once. Has resumed RT and is planned to finish at the end of next week.

## 2021-07-22 NOTE — PROCEDURE
[FreeTextEntry1] : Aspiration [FreeTextEntry2] : abscess [FreeTextEntry3] : After cleansing the skin with betadine, and instilling 5 cc of 1% lidocaine with epi, an 18g needle was inserted into the collection seen on US. Approximately 14 cc of purulent fluid was removed with visible partial collapse of the collection. Additional passes of the needle were unable to completely collapse the cavity. The patient tolerated the procedure well. [___ ml Inj] : [unfilled] ~Uml [1%] : 1% [With Epi] : with epinephrine [Betadine] : using betadine

## 2021-07-23 ENCOUNTER — APPOINTMENT (OUTPATIENT)
Dept: SURGICAL ONCOLOGY | Facility: CLINIC | Age: 56
End: 2021-07-23
Payer: MEDICAID

## 2021-07-23 VITALS
OXYGEN SATURATION: 99 % | HEART RATE: 92 BPM | WEIGHT: 188 LBS | SYSTOLIC BLOOD PRESSURE: 138 MMHG | TEMPERATURE: 97.8 F | DIASTOLIC BLOOD PRESSURE: 81 MMHG | BODY MASS INDEX: 30.58 KG/M2 | HEIGHT: 65.75 IN

## 2021-07-23 PROCEDURE — 99024 POSTOP FOLLOW-UP VISIT: CPT

## 2021-07-23 PROCEDURE — 77427 RADIATION TX MANAGEMENT X5: CPT

## 2021-07-23 NOTE — PROCEDURE
[FreeTextEntry1] : Aspiration [FreeTextEntry2] : abscess [FreeTextEntry3] : After cleansing the skin with betadine, and instilling 8 cc of 1% lidocaine with epi, an 18g needle was inserted into the collection seen on US. Approximately 4-5 cc of serosanguinous fluid was removed with visible partial collapse of the collection. Additional passes of the needle were unable to completely collapse the cavity. The patient tolerated the procedure well. \par  [___ ml Inj] : [unfilled] ~Uml [1%] : 1%

## 2021-07-23 NOTE — HISTORY OF PRESENT ILLNESS
[FreeTextEntry1] : The patient is a 56 year old female with R breast IDC, moderately differentiated, DCIS intermediate grade, ER/IA positive, HER2 negative s/p R lumpectomy SLNB 5/17/2021, 1.5 cm IDC, grade 2; 7 mm DCIS, low to intermediate grade. Margins negative. 0/3 SLNB. pT1cN0. Currently undergoing radiation (week 3/4). Pt presented to the ED 7/11/21 with drainage from the right breast, and fevers s/p aspiration of 10 cc pus. Still on antibiotics.\par \par MA in the room assisted with translation.\par \par Interval history:\par Pt still taking antibiotics and continuing warm compresses three times daily. The incision continues to drain minimally. Skin the area is tender. Denies fevers/chills. RT is ongoing, boost planned for next week.

## 2021-07-23 NOTE — ASSESSMENT
[FreeTextEntry1] : The patient is a 56 year old female with R breast IDC, moderately differentiated, DCIS intermediate grade, ER/MT positive, HER2 negative s/p R lumpectomy SLNB 5/17/2021, 1.5 cm IDC, grade 2; 7 mm DCIS, low to intermediate grade. Margins negative. 0/3 SLNB. pT1cN0. Currently undergoing radiation (week 3/4) with a R axillary incision abscess s/p aspiration. On Augmentin, improved but not yet resolved.\par \par 4-5 cc of serosanguinous fluid aspirated today.\par \par Plan:\par  - continue po antibiotics\par  - continue warm compresses\par  - RTO next Wed for repeat exam and aspiration. \par

## 2021-07-23 NOTE — PHYSICAL EXAM
[de-identified] : Axillary incision erythematous and indurated. No fluctuance appreciated. US exam reveals a heterogeneous ill-defined collection. Needle aspiration performed with US guidance

## 2021-07-27 ENCOUNTER — NON-APPOINTMENT (OUTPATIENT)
Age: 56
End: 2021-07-27

## 2021-07-27 NOTE — PHYSICAL EXAM
[de-identified] :  erythema to treated  site, 2 superficial open areas right axilla, small area of pus visible

## 2021-07-27 NOTE — DISEASE MANAGEMENT
[Pathological] : TNM Stage: p [IA] : IA [TTNM] : 1b [NTNM] : 0 [MTNM] : 0 [de-identified] : right breast

## 2021-07-27 NOTE — HISTORY OF PRESENT ILLNESS
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 Will see Dr. Mena tomorrow for drainage. On antibiotics and using aquaphor\par \par 7/20/21: 12/20 fx fatigue, soreness right breast relieved with tylenol. 2 superficial open areas right axilla. Completed abx yesterday. Using aquaphor to treated area\par \par 7/14/2021 for on treatment visit. She has completed 1855/5240 cgy of radiation to the right breast. She was admitted 7/10-7/12 with drainage from right breast axilla and fevers. CT was concerning for abcess. She underwent I and D in the hospital, treated with vancomycin and zosyn.Saw Dr. Mena today and 2-3 cc aspirated, will see her again 7/16. sent home with augmentin until 7/18.

## 2021-07-27 NOTE — DISEASE MANAGEMENT
[Pathological] : TNM Stage: p [IA] : IA [TTNM] : 1b [NTNM] : 0 [MTNM] : 0 [de-identified] : right breast

## 2021-07-27 NOTE — HISTORY OF PRESENT ILLNESS
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 2 superficial open areas right axilla. Completed abx yesterday. Using aquaphor to treated area\par \par 7/14/2021 for on treatment visit. She has completed 1855/5240 cgy of radiation to the right breast. She was admitted 7/10-7/12 with drainage from right breast axilla and fevers. CT was concerning for abcess. She underwent I and D in the hospital, treated with vancomycin and zosyn.Saw Dr. Mena today and 2-3 cc aspirated, will see her again 7/16. sent home with augmentin until 7/18.

## 2021-07-27 NOTE — REVIEW OF SYSTEMS
[Fatigue: Grade 1 - Fatigue relieved by rest] : Fatigue: Grade 1 - Fatigue relieved by rest [Breast Pain: Grade 0] : Breast Pain: Grade 0 [Dermatitis Radiation: Grade 2 - Moderate to brisk erythema; patchy moist desquamation, mostly confined to skin folds and creases; moderate edema] : Dermatitis Radiation: Grade 2 - Moderate to brisk erythema; patchy moist desquamation, mostly confined to skin folds and creases; moderate edema

## 2021-07-27 NOTE — PHYSICAL EXAM
[General Appearance - Alert] : alert [General Appearance - In No Acute Distress] : in no acute distress [No UE Edema] : there is no upper extremity edema [de-identified] : moderate erythema to treated  site and blister right axilla

## 2021-07-28 ENCOUNTER — APPOINTMENT (OUTPATIENT)
Dept: SURGICAL ONCOLOGY | Facility: CLINIC | Age: 56
End: 2021-07-28
Payer: MEDICAID

## 2021-07-28 VITALS
WEIGHT: 191.25 LBS | DIASTOLIC BLOOD PRESSURE: 79 MMHG | TEMPERATURE: 98.6 F | OXYGEN SATURATION: 100 % | HEART RATE: 95 BPM | SYSTOLIC BLOOD PRESSURE: 166 MMHG | RESPIRATION RATE: 16 BRPM | BODY MASS INDEX: 31.1 KG/M2

## 2021-07-28 VITALS
HEIGHT: 65.75 IN | WEIGHT: 191 LBS | BODY MASS INDEX: 31.07 KG/M2 | RESPIRATION RATE: 16 BRPM | TEMPERATURE: 98.1 F | HEART RATE: 98 BPM | DIASTOLIC BLOOD PRESSURE: 86 MMHG | OXYGEN SATURATION: 99 % | SYSTOLIC BLOOD PRESSURE: 135 MMHG

## 2021-07-28 PROCEDURE — 99024 POSTOP FOLLOW-UP VISIT: CPT

## 2021-07-29 NOTE — ASSESSMENT
[FreeTextEntry1] : The patient is a 56 year old female with R breast IDC, moderately differentiated, DCIS intermediate grade, ER/WA positive, HER2 negative s/p R lumpectomy SLNB 5/17/2021, 1.5 cm IDC, grade 2; 7 mm DCIS, low to intermediate grade. Margins negative. 0/3 SLNB. pT1cN0. Currently undergoing radiation (week 4/4) with a R axillary incision abscess s/p aspiration with minimal output (1 cc). On Augmentin, improved but not yet resolved.\par \par Plan:\par  - continue po antibiotics\par  - continue warm compresses\par  - RTO 1 week, continue to monitor\par

## 2021-07-29 NOTE — HISTORY OF PRESENT ILLNESS
[FreeTextEntry1] : The patient is a 56 year old female with R breast IDC, moderately differentiated, DCIS intermediate grade, ER/IN positive, HER2 negative s/p R lumpectomy SLNB 5/17/2021, 1.5 cm IDC, grade 2; 7 mm DCIS, low to intermediate grade. Margins negative. 0/3 SLNB. pT1cN0. Currently undergoing radiation (week 4/4). Pt presented to the ED 7/11/21 with drainage from the right breast, and fevers s/p aspiration of 10 cc pus. Still on antibiotics.\par \par Accompanied by her daughter who assisted in translation.\par \par Interval history:\par Pt still taking antibiotics and continuing warm compresses three times daily. The incision continues to drain minimally. Skin the area is tender. Denies fevers/chills. RT is ongoing--in last week of Boost, to be completed this Friday.

## 2021-07-29 NOTE — PROCEDURE
[FreeTextEntry1] : aspiration [FreeTextEntry2] : abscess [FreeTextEntry3] : After cleansing the skin with betadine, and instilling 6 cc of 1% lidocaine with epi, an 18g needle was inserted into the heterogeneous collection seen on US. Approximately 1 cc of serosanguinous fluid was removed with minimal change to the collection. Additional passes of the needle were unable to completely collapse the cavity. The patient tolerated the procedure well. \par  [___ ml Inj] : [unfilled] ~Uml [1%] : 1% [With Epi] : with epinephrine

## 2021-07-29 NOTE — PHYSICAL EXAM
[Normocephalic] : normocephalic [Atraumatic] : atraumatic [EOMI] : extra ocular movement intact [PERRL] : pupils equal, round and reactive to light [Sclera nonicteric] : sclera nonicteric [Breast Nipple Inversion] : nipples not inverted [Breast Nipple Retraction] : nipples not retracted [Breast Nipple Flattening] : nipples not flattened [Breast Nipple Fissures] : nipples not fissured [Breast Abnormal Lactation (Galactorrhea)] : no galactorrhea [Breast Abnormal Secretion Bloody Fluid] : no bloody discharge [Breast Abnormal Secretion Serous Fluid] : no serous discharge [Breast Abnormal Secretion Opalescent Fluid] : no milky discharge [de-identified] : non-labored respirations  [de-identified] : incision with 2 superficial fluctuant areas, induration, unchanged hyperpigmentation surrounding incision, and now mild erythema seen encompassing entire breast, likely from radiation

## 2021-07-30 PROCEDURE — 77427 RADIATION TX MANAGEMENT X5: CPT

## 2021-08-04 ENCOUNTER — APPOINTMENT (OUTPATIENT)
Dept: SURGICAL ONCOLOGY | Facility: CLINIC | Age: 56
End: 2021-08-04
Payer: MEDICAID

## 2021-08-04 VITALS
WEIGHT: 190 LBS | HEIGHT: 65.75 IN | SYSTOLIC BLOOD PRESSURE: 132 MMHG | OXYGEN SATURATION: 99 % | BODY MASS INDEX: 30.9 KG/M2 | HEART RATE: 82 BPM | TEMPERATURE: 98.1 F | DIASTOLIC BLOOD PRESSURE: 81 MMHG | RESPIRATION RATE: 16 BRPM

## 2021-08-04 PROCEDURE — 99024 POSTOP FOLLOW-UP VISIT: CPT

## 2021-08-04 NOTE — HISTORY OF PRESENT ILLNESS
[FreeTextEntry1] : The patient is a 56 year old female with R breast IDC, moderately differentiated, DCIS intermediate grade, ER/DE positive, HER2 negative s/p R lumpectomy SLNB 5/17/2021, 1.5 cm IDC, grade 2; 7 mm DCIS, low to intermediate grade. Margins negative. 0/3 SLNB. pT1cN0.  Pt presented to the ED 7/11/21 with drainage from the right breast while in the middle of radiation. Managed with serial aspirations and po antibiotics. \par \par Interval history:\par Pt is still continuing warm compresses three times daily. Has completed her antibiotics x 14 days. The incision continues to drain minimally. Skin the area is tender. Denies fevers/chills.

## 2021-08-04 NOTE — PHYSICAL EXAM
[Normocephalic] : normocephalic [Atraumatic] : atraumatic [EOMI] : extra ocular movement intact [PERRL] : pupils equal, round and reactive to light [Sclera nonicteric] : sclera nonicteric [Supple] : supple [No Supraclavicular Adenopathy] : no supraclavicular adenopathy [No Cervical Adenopathy] : no cervical adenopathy [de-identified] : non-labored respirations  [de-identified] : Axillary incision improved, less edematous. Surrounding skin diffusely erythematous.

## 2021-08-04 NOTE — ASSESSMENT
[FreeTextEntry1] : The patient is a 56 year old female with R breast IDC, moderately differentiated, DCIS intermediate grade, ER/AK positive, HER2 negative s/p R lumpectomy SLNB 5/17/2021, 1.5 cm IDC, grade 2; 7 mm DCIS, low to intermediate grade. Margins negative. 0/3 SLNB. pT1cN0.  With a R axillary incision abscess while undergoing RT (completed 7/30), s/p serial aspiration and po abx.\par \par Overall appearance improved. Aspiration of seroma cavity attempted again with little output. \par \par \par Plan:\par  - continue po antibiotics\par  - continue warm compresses\par  - Pt to follow with rad onc\par  - RTO 3 weeks, continue to monitor\par . \par

## 2021-08-16 ENCOUNTER — NON-APPOINTMENT (OUTPATIENT)
Age: 56
End: 2021-08-16

## 2021-08-18 ENCOUNTER — RX RENEWAL (OUTPATIENT)
Age: 56
End: 2021-08-18

## 2021-08-25 ENCOUNTER — APPOINTMENT (OUTPATIENT)
Dept: SURGICAL ONCOLOGY | Facility: CLINIC | Age: 56
End: 2021-08-25
Payer: MEDICAID

## 2021-08-25 VITALS
OXYGEN SATURATION: 99 % | HEART RATE: 94 BPM | BODY MASS INDEX: 31.39 KG/M2 | DIASTOLIC BLOOD PRESSURE: 85 MMHG | SYSTOLIC BLOOD PRESSURE: 132 MMHG | WEIGHT: 193 LBS

## 2021-08-25 DIAGNOSIS — N61.1 ABSCESS OF THE BREAST AND NIPPLE: ICD-10-CM

## 2021-08-25 PROCEDURE — 99212 OFFICE O/P EST SF 10 MIN: CPT

## 2021-08-25 NOTE — HISTORY OF PRESENT ILLNESS
[FreeTextEntry1] : The patient is a 56 year old female with R breast IDC, moderately differentiated, DCIS intermediate grade, ER/GA positive, HER2 negative s/p R lumpectomy SLNB 5/17/2021, 1.5 cm IDC, grade 2; 7 mm DCIS, low to intermediate grade. Margins negative. 0/3 SLNB. pT1cN0.  Pt presented to the ED 7/11/21 with drainage from the right breast while in the middle of radiation. Managed with serial aspirations and po antibiotics. \par \par Interval history:\par The patient reports that she has healed since the last visit. The axilla is no longer draining and the incision has closed. The skin is still dark with lighter patches. Otherwise, no redness. Denies fevers.

## 2021-08-25 NOTE — ASSESSMENT
[FreeTextEntry1] : The patient is a 56 year old female with R breast IDC, moderately differentiated, DCIS intermediate grade, ER/WY positive, HER2 negative s/p R lumpectomy SLNB 5/17/2021, 1.5 cm IDC, grade 2; 7 mm DCIS, low to intermediate grade. Margins negative. 0/3 SLNB. pT1cN0. s/p adjuvant RT with a R axillary incision abscess s/p aspirations and antibiotics. Exam today reveals no evidence of residual infection.\par \par Plan:\par  - Next imaging 3/2022 B/L SM/US\par  - Screening MRI 9/2022\par  - RTO after 3/2022 mammogram\par

## 2021-08-25 NOTE — CONSULT LETTER
[Dear  ___] : Dear  [unfilled], [Courtesy Letter:] : I had the pleasure of seeing your patient, [unfilled], in my office today. [Please see my note below.] : Please see my note below. [Consult Closing:] : Thank you very much for allowing me to participate in the care of this patient.  If you have any questions, please do not hesitate to contact me. [Sincerely,] : Sincerely, [FreeTextEntry3] : Milla Mena MD\par Breast Surgeon\par Division of Surgical Oncology\par Department of Surgery\par [address] [place] [address]\[place] [address] \par Tel: [phone]\par Fax: [phone]\par Email: [email or web address]

## 2021-08-25 NOTE — PHYSICAL EXAM
[Normocephalic] : normocephalic [Atraumatic] : atraumatic [EOMI] : extra ocular movement intact [PERRL] : pupils equal, round and reactive to light [Sclera nonicteric] : sclera nonicteric [Supple] : supple [No Supraclavicular Adenopathy] : no supraclavicular adenopathy [No Cervical Adenopathy] : no cervical adenopathy [No Axillary Lymphadenopathy] : no right axillary lymphadenopathy [No Edema] : no edema [No Swelling] : no swelling [Full ROM] : full range of motion [de-identified] : non-labored respirations  [de-identified] : axillary incision closed, c/d/i. Overlying hyperpigmentation. No erythema. No fluctuance. Breast also with hyperpigmentation, but no erythema. Incision well-healed.

## 2021-09-07 ENCOUNTER — APPOINTMENT (OUTPATIENT)
Dept: RADIATION ONCOLOGY | Facility: CLINIC | Age: 56
End: 2021-09-07
Payer: MEDICAID

## 2021-09-07 VITALS
BODY MASS INDEX: 31.87 KG/M2 | TEMPERATURE: 96.08 F | OXYGEN SATURATION: 98 % | RESPIRATION RATE: 17 BRPM | HEART RATE: 74 BPM | SYSTOLIC BLOOD PRESSURE: 157 MMHG | DIASTOLIC BLOOD PRESSURE: 82 MMHG | WEIGHT: 195.99 LBS

## 2021-09-07 PROCEDURE — 99024 POSTOP FOLLOW-UP VISIT: CPT

## 2021-09-07 RX ORDER — AMOXICILLIN AND CLAVULANATE POTASSIUM 500; 125 MG/1; MG/1
500-125 TABLET, FILM COATED ORAL
Qty: 21 | Refills: 0 | Status: DISCONTINUED | COMMUNITY
Start: 2021-07-20 | End: 2021-09-07

## 2021-09-07 NOTE — DISEASE MANAGEMENT
[Pathological] : TNM Stage: p [IA] : IA [TTNM] : 1b [NTNM] : 0 [MTNM] : 0 [de-identified] : right breast

## 2021-09-07 NOTE — PHYSICAL EXAM
[General Appearance - Alert] : alert [General Appearance - In No Acute Distress] : in no acute distress [Oriented To Time, Place, And Person] : oriented to person, place, and time [Normal] : normal heart rate and rhythm, normal S1 and S2, and no murmurs present [No UE Edema] : there is no upper extremity edema [de-identified] : hyperpigmentation radiation field right breast [de-identified] : residual hyperpigmentation treated site

## 2021-09-07 NOTE — HISTORY OF PRESENT ILLNESS
[FreeTextEntry1] :  - # [id number]\par \par 55 yo female \par \par Diagnosis:  R upper outer breast intraductal carcinoma  pT1c(1.5 cm) N0 (0/3), grade 2, with associated DCIS(7mm) intermediate grade, ER+DE+ HER2 negative\par \par Radiation \par Treatment: Breast _R   4,240 cGy from  6/30/2021 -7/30/2021    \par Clinical course:  Tolerated the treatment well.  \par \par Today: Feels well. Infection settled. Denies breast pain, discharge or masses. taking anastrazole.\par \par \par \par \par \par \par \par

## 2021-09-07 NOTE — REVIEW OF SYSTEMS
[Fatigue: Grade 0] : Fatigue: Grade 0 [Breast Pain: Grade 0] : Breast Pain: Grade 0 [Skin Hyperpigmentation: Grade 1 - Hyperpigmentation covering <10% BSA; no psychosocial impact] : Skin Hyperpigmentation: Grade 1 - Hyperpigmentation covering <10% BSA; no psychosocial impact [Dermatitis Radiation: Grade 0] : Dermatitis Radiation: Grade 0

## 2021-09-15 ENCOUNTER — APPOINTMENT (OUTPATIENT)
Dept: OBGYN | Facility: HOSPITAL | Age: 56
End: 2021-09-15
Payer: MEDICAID

## 2021-09-15 ENCOUNTER — OUTPATIENT (OUTPATIENT)
Dept: OUTPATIENT SERVICES | Facility: HOSPITAL | Age: 56
LOS: 1 days | End: 2021-09-15

## 2021-09-15 VITALS
DIASTOLIC BLOOD PRESSURE: 89 MMHG | BODY MASS INDEX: 31.99 KG/M2 | HEIGHT: 65 IN | TEMPERATURE: 208.4 F | HEART RATE: 90 BPM | WEIGHT: 192 LBS | SYSTOLIC BLOOD PRESSURE: 156 MMHG

## 2021-09-15 DIAGNOSIS — Z98.890 OTHER SPECIFIED POSTPROCEDURAL STATES: Chronic | ICD-10-CM

## 2021-09-15 PROCEDURE — ZZZZZ: CPT | Mod: GE

## 2021-09-16 DIAGNOSIS — Z30.432 ENCOUNTER FOR REMOVAL OF INTRAUTERINE CONTRACEPTIVE DEVICE: ICD-10-CM

## 2021-09-18 NOTE — PROCEDURE
[IUD Removal] : intrauterine device (IUD) removal [ IUD] :  IUD [Risks] : risks [Benefits] : benefits [Alternatives] : alternatives [Patient] : patient [Speculum Placed] : speculum placed [Strings Visualized] : strings visualized [IUD Discarded] : IUD discarded [Tolerated Well] : Patient tolerated the procedure well [No Complications] : no complications [Pelvic Pain] : for pelvic pain [Heavy Vaginal Bleeding] : for heavy vaginal bleeding [de-identified] : IUD removed with ring forceps

## 2021-09-18 NOTE — PHYSICAL EXAM
[Appropriately responsive] : appropriately responsive [Alert] : alert [No Acute Distress] : no acute distress [No Lymphadenopathy] : no lymphadenopathy [Regular Rate Rhythm] : regular rate rhythm [No Murmurs] : no murmurs [Clear to Auscultation B/L] : clear to auscultation bilaterally [Soft] : soft [Non-tender] : non-tender [Non-distended] : non-distended [No HSM] : No HSM [No Lesions] : no lesions [No Mass] : no mass [Oriented x3] : oriented x3 [Examination Of The Breasts] : a normal appearance [No Masses] : no breast masses were palpable [Labia Majora] : normal [Labia Minora] : normal [No Bleeding] : There was no active vaginal bleeding [IUD String] : an IUD string was noted [Normal] : normal [Uterine Adnexae] : normal [Tenderness] : nontender [Enlarged ___ wks] : not enlarged [Mass ___ cm] : no uterine mass was palpated

## 2021-10-13 ENCOUNTER — OUTPATIENT (OUTPATIENT)
Dept: OUTPATIENT SERVICES | Facility: HOSPITAL | Age: 56
LOS: 1 days | Discharge: HOME | End: 2021-10-13

## 2021-10-13 DIAGNOSIS — Z98.890 OTHER SPECIFIED POSTPROCEDURAL STATES: Chronic | ICD-10-CM

## 2021-10-13 DIAGNOSIS — C50.919 MALIGNANT NEOPLASM OF UNSPECIFIED SITE OF UNSPECIFIED FEMALE BREAST: ICD-10-CM

## 2021-10-15 ENCOUNTER — RESULT REVIEW (OUTPATIENT)
Age: 56
End: 2021-10-15

## 2021-10-15 ENCOUNTER — APPOINTMENT (OUTPATIENT)
Dept: HEMATOLOGY ONCOLOGY | Facility: CLINIC | Age: 56
End: 2021-10-15
Payer: MEDICAID

## 2021-10-15 VITALS
HEIGHT: 65 IN | DIASTOLIC BLOOD PRESSURE: 79 MMHG | HEART RATE: 73 BPM | WEIGHT: 193.54 LBS | SYSTOLIC BLOOD PRESSURE: 121 MMHG | RESPIRATION RATE: 18 BRPM | OXYGEN SATURATION: 98 % | TEMPERATURE: 208.4 F | BODY MASS INDEX: 32.25 KG/M2

## 2021-10-15 LAB
BASOPHILS # BLD AUTO: 0.06 K/UL — SIGNIFICANT CHANGE UP (ref 0–0.2)
BASOPHILS NFR BLD AUTO: 0.9 % — SIGNIFICANT CHANGE UP (ref 0–2)
EOSINOPHIL # BLD AUTO: 0.31 K/UL — SIGNIFICANT CHANGE UP (ref 0–0.5)
EOSINOPHIL NFR BLD AUTO: 4.7 % — SIGNIFICANT CHANGE UP (ref 0–6)
HCT VFR BLD CALC: 43.8 % — SIGNIFICANT CHANGE UP (ref 34.5–45)
HGB BLD-MCNC: 14.4 G/DL — SIGNIFICANT CHANGE UP (ref 11.5–15.5)
IMM GRANULOCYTES NFR BLD AUTO: 0.5 % — SIGNIFICANT CHANGE UP (ref 0–1.5)
LYMPHOCYTES # BLD AUTO: 1.85 K/UL — SIGNIFICANT CHANGE UP (ref 1–3.3)
LYMPHOCYTES # BLD AUTO: 27.9 % — SIGNIFICANT CHANGE UP (ref 13–44)
MCHC RBC-ENTMCNC: 27.4 PG — SIGNIFICANT CHANGE UP (ref 27–34)
MCHC RBC-ENTMCNC: 32.9 G/DL — SIGNIFICANT CHANGE UP (ref 32–36)
MCV RBC AUTO: 83.3 FL — SIGNIFICANT CHANGE UP (ref 80–100)
MONOCYTES # BLD AUTO: 0.46 K/UL — SIGNIFICANT CHANGE UP (ref 0–0.9)
MONOCYTES NFR BLD AUTO: 6.9 % — SIGNIFICANT CHANGE UP (ref 2–14)
NEUTROPHILS # BLD AUTO: 3.93 K/UL — SIGNIFICANT CHANGE UP (ref 1.8–7.4)
NEUTROPHILS NFR BLD AUTO: 59.1 % — SIGNIFICANT CHANGE UP (ref 43–77)
NRBC # BLD: 0 /100 WBCS — SIGNIFICANT CHANGE UP (ref 0–0)
PLATELET # BLD AUTO: 308 K/UL — SIGNIFICANT CHANGE UP (ref 150–400)
RBC # BLD: 5.26 M/UL — HIGH (ref 3.8–5.2)
RBC # FLD: 12.3 % — SIGNIFICANT CHANGE UP (ref 10.3–14.5)
WBC # BLD: 6.64 K/UL — SIGNIFICANT CHANGE UP (ref 3.8–10.5)
WBC # FLD AUTO: 6.64 K/UL — SIGNIFICANT CHANGE UP (ref 3.8–10.5)

## 2021-10-15 PROCEDURE — 99214 OFFICE O/P EST MOD 30 MIN: CPT

## 2021-10-15 NOTE — REASON FOR VISIT
[Other: _____] : [unfilled] [Follow-Up Visit] : a follow-up [FreeTextEntry2] : breast ca, odx 9 [FreeTextEntry3] : pt is Filipino speaking. DtR and SL translating

## 2021-10-15 NOTE — PHYSICAL EXAM
[Fully active, able to carry on all pre-disease performance without restriction] : Status 0 - Fully active, able to carry on all pre-disease performance without restriction [Normal] : affect appropriate [de-identified] : Post RT skin changes noted, right lumpectomy scar healing well

## 2021-10-15 NOTE — CONSULT LETTER
[Please see my note below.] : Please see my note below. [Consult Closing:] : Thank you very much for allowing me to participate in the care of this patient.  If you have any questions, please do not hesitate to contact me. [Sincerely,] : Sincerely, [FreeTextEntry3] : Camila Max MD\par Division of Medical Oncology and Hematology\par [facility] [place]\par [employer] [facility]\par [place], [place]\par \par

## 2021-10-15 NOTE — HISTORY OF PRESENT ILLNESS
[Date: ____________] : Patient's last distress assessment performed on [unfilled]. [7 - Distress Level] : Distress Level: 7 [Patient given social work contact information and resource sheet] : Patient was given social work contact information and resource sheet [de-identified] : Ms. SHANTAL HANSON is a 56 year old female here for an evaluation of breast cancer. Her oncologic history is as follows:\par \par Screening mammo 3/19/2021: R outer/upper outer breast 2.5 cm mass and R lateral to mass 6 mm cluster of calcs. Diagnostic right mammo 3/24/21 showed R upper outer breast mass 1.4 x 1.6 cm with heterogeneous microcalcifications. Sono:mass at 10:00: 1.5 x 1 x 0.9 cm irregular hypoechoic mass. \par \par 3/31/2021: Right breast biopsy 10:00: invasive moderately diff ductal carcinoma, DCIS, intermediate grade, focal. ER >98%, MA >98%, HER2 negative\par \par 4/16/2021 MRI: concordant with known malignancy right breast. No suspicious findings in left breast\par \par 5/17/2021: R lumpectomy and SLNB revealed 1.5 cm IDC, grade 2; 7 mm DCIS, low to intermediate grade. Margins negative(Fragmented nature of the final margin specimens preclude assessment of the distance). 0/3 SLNB. ER : Positive, > 98%, PgR : Positive, > 98%, HER2: Negative. pT1cN0, under care of Dr Mena.\par \par  Oncotype DX 9\par \par LMP 10 years ago [de-identified] : Ms. SHANTAL HANSON  is here for a follow up appt for stage IA (T1c, N0, M0) ER positive, NC positive, HER-2/katarzyna negative invasive ductal carcinoma of the right breast. ODX 9. Completed RT 9/7/21. Started anastrozole 9/22/21 \par 7/9/21 admitted for post lumpectomy infection, resolved now\par Takes Anastrozole daily, good compliance. She reports hot flashes,  denies aches/pain, vag dryness, excessive fatigue, GI s/e, hair loss. She is active, no change in energy, wt or appetite. \par mammogram -  3/2021\par DEXA-  06/23/2021 DEXA Normal.  She takes ca+vit \par

## 2021-10-15 NOTE — REVIEW OF SYSTEMS
Per orders of Dr. Pathak, injection of MMR given by Olga Lidia Haynes. Patient instructed to remain in clinic for 15 minutes afterwards, and to report any adverse reaction to me immediately.  Prior to injection verified patient identity using patient's name and date of birth.      
[Negative] : Allergic/Immunologic

## 2021-10-15 NOTE — ASSESSMENT
[FreeTextEntry1] : In summary, Ms. SHANTAL HANSON is a 56 year old postmenopausal female with stage IA (T1c, N0, M0) ER positive, NM positive, HER-2/katarzyna negative invasive ductal carcinoma of the right breast. She is status post lumpectomy, met with Dr Avila completed RT on 9/7/21. ODX 9 . She started anastrozole 9/22/21\par \par 1. Breast ca- Ms. SHANTAL HANSON  is tolerating AI well, good compliance. She has mild side effects from the treatment. Continue treatment with Arimidex 1 mg daily for five years.  Annual breast imaging by Dr. Milla Mena\par 2. Concern for worsening bone density and fractures due to anastrozole. Rec to  continue calcium and vit D. DEXA 1-2 yrs Baseline DEXA  06/23/2021 DEXA Normal.\par 3. Low Vitamin D: concern for worsening bone loss due to anastrozole and low vit D. Discussed to increase Vit D intake. Will check Vit D level today\par 4. Concern for worsening cholesterol/CAD risk factors due to anastrozole. Lipid profile annually. Life style modifications d/w her.\par \par 	\par RTC 6 m

## 2021-10-18 ENCOUNTER — NON-APPOINTMENT (OUTPATIENT)
Age: 56
End: 2021-10-18

## 2021-10-19 LAB
25(OH)D3 SERPL-MCNC: 30.3 NG/ML
ALBUMIN SERPL ELPH-MCNC: 4.6 G/DL
ALP BLD-CCNC: 61 U/L
ALT SERPL-CCNC: 27 U/L
ANION GAP SERPL CALC-SCNC: 13 MMOL/L
AST SERPL-CCNC: 29 U/L
BILIRUB SERPL-MCNC: 0.2 MG/DL
BUN SERPL-MCNC: 10 MG/DL
CALCIUM SERPL-MCNC: 10.1 MG/DL
CHLORIDE SERPL-SCNC: 104 MMOL/L
CO2 SERPL-SCNC: 26 MMOL/L
CREAT SERPL-MCNC: 0.65 MG/DL
GLUCOSE SERPL-MCNC: 108 MG/DL
POTASSIUM SERPL-SCNC: 3.9 MMOL/L
PROT SERPL-MCNC: 7.8 G/DL
SODIUM SERPL-SCNC: 143 MMOL/L

## 2021-12-17 ENCOUNTER — APPOINTMENT (OUTPATIENT)
Dept: RADIATION ONCOLOGY | Facility: CLINIC | Age: 56
End: 2021-12-17
Payer: MEDICAID

## 2021-12-17 VITALS
HEART RATE: 74 BPM | HEIGHT: 65 IN | DIASTOLIC BLOOD PRESSURE: 79 MMHG | RESPIRATION RATE: 16 BRPM | WEIGHT: 200.18 LBS | BODY MASS INDEX: 33.35 KG/M2 | OXYGEN SATURATION: 100 % | SYSTOLIC BLOOD PRESSURE: 145 MMHG

## 2021-12-17 PROCEDURE — 99212 OFFICE O/P EST SF 10 MIN: CPT

## 2021-12-17 NOTE — HISTORY OF PRESENT ILLNESS
[FreeTextEntry1] :  - # [id number]\par \par 57 yo female \par \par Diagnosis:  R upper outer breast intraductal carcinoma  pT1c(1.5 cm) N0 (0/3), grade 2, with associated DCIS(7mm) intermediate grade, ER+OH+ HER2 negative\par \par Radiation \par Treatment: Breast _R   4,240 cGy from  6/30/2021 -7/30/2021    \par Clinical course:  Tolerated the treatment well despite breast abcess during treatment\par \par Today: Feels well. Infection settled. Denies breast pain, discharge or masses. taking anastrazole.\par \par 12/17/21:  Ms Sepulveda presents today for follow up.  Via  # [id number] pt reports feeling fine and that her skin is healing well.  She denies pain, masses or discharge.  Continues to take anastrazole.

## 2021-12-17 NOTE — PHYSICAL EXAM
[Normal] : normal heart rate and rhythm, normal S1 and S2, and no murmurs present [No UE Edema] : there is no upper extremity edema [Cervical Lymph Nodes Enlarged Posterior Bilaterally] : posterior cervical [Cervical Lymph Nodes Enlarged Anterior Bilaterally] : anterior cervical [Supraclavicular Lymph Nodes Enlarged Bilaterally] : supraclavicular [Axillary Lymph Nodes Enlarged Bilaterally] : axillary [de-identified] : mild skin thickening and hyperpigmentation right breast [de-identified] : hyperpigmentation right breast

## 2021-12-17 NOTE — DISEASE MANAGEMENT
[Pathological] : TNM Stage: p [IA] : IA [TTNM] : 1b [NTNM] : 0 [MTNM] : 0 [de-identified] : right breast

## 2022-02-10 ENCOUNTER — RX RENEWAL (OUTPATIENT)
Age: 57
End: 2022-02-10

## 2022-03-02 ENCOUNTER — RX RENEWAL (OUTPATIENT)
Age: 57
End: 2022-03-02

## 2022-03-15 ENCOUNTER — APPOINTMENT (OUTPATIENT)
Dept: OBGYN | Facility: HOSPITAL | Age: 57
End: 2022-03-15
Payer: MEDICAID

## 2022-03-15 ENCOUNTER — OUTPATIENT (OUTPATIENT)
Dept: OUTPATIENT SERVICES | Facility: HOSPITAL | Age: 57
LOS: 1 days | End: 2022-03-15

## 2022-03-15 ENCOUNTER — RESULT REVIEW (OUTPATIENT)
Age: 57
End: 2022-03-15

## 2022-03-15 VITALS
WEIGHT: 202 LBS | HEART RATE: 90 BPM | BODY MASS INDEX: 33.66 KG/M2 | DIASTOLIC BLOOD PRESSURE: 84 MMHG | SYSTOLIC BLOOD PRESSURE: 146 MMHG | TEMPERATURE: 208.4 F | HEIGHT: 65 IN

## 2022-03-15 DIAGNOSIS — Z98.890 OTHER SPECIFIED POSTPROCEDURAL STATES: Chronic | ICD-10-CM

## 2022-03-15 PROCEDURE — ZZZZZ: CPT

## 2022-03-16 DIAGNOSIS — Z01.419 ENCOUNTER FOR GYNECOLOGICAL EXAMINATION (GENERAL) (ROUTINE) WITHOUT ABNORMAL FINDINGS: ICD-10-CM

## 2022-03-16 DIAGNOSIS — C50.911 MALIGNANT NEOPLASM OF UNSPECIFIED SITE OF RIGHT FEMALE BREAST: ICD-10-CM

## 2022-03-27 ENCOUNTER — RESULT CHARGE (OUTPATIENT)
Age: 57
End: 2022-03-27

## 2022-03-28 ENCOUNTER — RESULT REVIEW (OUTPATIENT)
Age: 57
End: 2022-03-28

## 2022-03-28 ENCOUNTER — APPOINTMENT (OUTPATIENT)
Dept: INTERNAL MEDICINE | Facility: CLINIC | Age: 57
End: 2022-03-28
Payer: MEDICAID

## 2022-03-28 ENCOUNTER — NON-APPOINTMENT (OUTPATIENT)
Age: 57
End: 2022-03-28

## 2022-03-28 ENCOUNTER — OUTPATIENT (OUTPATIENT)
Dept: OUTPATIENT SERVICES | Facility: HOSPITAL | Age: 57
LOS: 1 days | End: 2022-03-28

## 2022-03-28 VITALS
HEART RATE: 77 BPM | BODY MASS INDEX: 33.49 KG/M2 | WEIGHT: 201 LBS | TEMPERATURE: 206.6 F | OXYGEN SATURATION: 98 % | DIASTOLIC BLOOD PRESSURE: 80 MMHG | HEIGHT: 65 IN | SYSTOLIC BLOOD PRESSURE: 150 MMHG

## 2022-03-28 VITALS — SYSTOLIC BLOOD PRESSURE: 141 MMHG | DIASTOLIC BLOOD PRESSURE: 90 MMHG

## 2022-03-28 DIAGNOSIS — Z00.00 ENCOUNTER FOR GENERAL ADULT MEDICAL EXAMINATION W/OUT ABNORMAL FINDINGS: ICD-10-CM

## 2022-03-28 DIAGNOSIS — Z98.890 OTHER SPECIFIED POSTPROCEDURAL STATES: Chronic | ICD-10-CM

## 2022-03-28 LAB
24R-OH-CALCIDIOL SERPL-MCNC: 23.2 NG/ML — LOW (ref 30–80)
A1C WITH ESTIMATED AVERAGE GLUCOSE RESULT: 7.6 % — HIGH (ref 4–5.6)
ALBUMIN SERPL ELPH-MCNC: 4.6 G/DL — SIGNIFICANT CHANGE UP (ref 3.3–5)
ALP SERPL-CCNC: 57 U/L — SIGNIFICANT CHANGE UP (ref 40–120)
ALT FLD-CCNC: 38 U/L — HIGH (ref 4–33)
ANION GAP SERPL CALC-SCNC: 12 MMOL/L — SIGNIFICANT CHANGE UP (ref 7–14)
AST SERPL-CCNC: 31 U/L — SIGNIFICANT CHANGE UP (ref 4–32)
BASOPHILS # BLD AUTO: 0.05 K/UL — SIGNIFICANT CHANGE UP (ref 0–0.2)
BASOPHILS NFR BLD AUTO: 0.7 % — SIGNIFICANT CHANGE UP (ref 0–2)
BILIRUB SERPL-MCNC: 0.2 MG/DL — SIGNIFICANT CHANGE UP (ref 0.2–1.2)
BUN SERPL-MCNC: 12 MG/DL — SIGNIFICANT CHANGE UP (ref 7–23)
CALCIUM SERPL-MCNC: 10 MG/DL — SIGNIFICANT CHANGE UP (ref 8.4–10.5)
CHLORIDE SERPL-SCNC: 106 MMOL/L — SIGNIFICANT CHANGE UP (ref 98–107)
CHOLEST SERPL-MCNC: 161 MG/DL — SIGNIFICANT CHANGE UP
CO2 SERPL-SCNC: 24 MMOL/L — SIGNIFICANT CHANGE UP (ref 22–31)
CREAT SERPL-MCNC: 0.64 MG/DL — SIGNIFICANT CHANGE UP (ref 0.5–1.3)
EGFR: 104 ML/MIN/1.73M2 — SIGNIFICANT CHANGE UP
EOSINOPHIL # BLD AUTO: 0.31 K/UL — SIGNIFICANT CHANGE UP (ref 0–0.5)
EOSINOPHIL NFR BLD AUTO: 4.5 % — SIGNIFICANT CHANGE UP (ref 0–6)
ESTIMATED AVERAGE GLUCOSE: 171 — SIGNIFICANT CHANGE UP
GLUCOSE SERPL-MCNC: 125 MG/DL — HIGH (ref 70–99)
HCT VFR BLD CALC: 42.4 % — SIGNIFICANT CHANGE UP (ref 34.5–45)
HDLC SERPL-MCNC: 48 MG/DL — LOW
HGB BLD-MCNC: 14.4 G/DL — SIGNIFICANT CHANGE UP (ref 11.5–15.5)
IANC: 3.99 K/UL — SIGNIFICANT CHANGE UP (ref 1.8–7.4)
IMM GRANULOCYTES NFR BLD AUTO: 0.3 % — SIGNIFICANT CHANGE UP (ref 0–1.5)
LIPID PNL WITH DIRECT LDL SERPL: 77 MG/DL — SIGNIFICANT CHANGE UP
LYMPHOCYTES # BLD AUTO: 2.06 K/UL — SIGNIFICANT CHANGE UP (ref 1–3.3)
LYMPHOCYTES # BLD AUTO: 29.7 % — SIGNIFICANT CHANGE UP (ref 13–44)
MCHC RBC-ENTMCNC: 28.3 PG — SIGNIFICANT CHANGE UP (ref 27–34)
MCHC RBC-ENTMCNC: 34 GM/DL — SIGNIFICANT CHANGE UP (ref 32–36)
MCV RBC AUTO: 83.3 FL — SIGNIFICANT CHANGE UP (ref 80–100)
MONOCYTES # BLD AUTO: 0.5 K/UL — SIGNIFICANT CHANGE UP (ref 0–0.9)
MONOCYTES NFR BLD AUTO: 7.2 % — SIGNIFICANT CHANGE UP (ref 2–14)
NEUTROPHILS # BLD AUTO: 3.99 K/UL — SIGNIFICANT CHANGE UP (ref 1.8–7.4)
NEUTROPHILS NFR BLD AUTO: 57.6 % — SIGNIFICANT CHANGE UP (ref 43–77)
NON HDL CHOLESTEROL: 113 MG/DL — SIGNIFICANT CHANGE UP
NRBC # BLD: 0 /100 WBCS — SIGNIFICANT CHANGE UP
NRBC # FLD: 0 K/UL — SIGNIFICANT CHANGE UP
PLATELET # BLD AUTO: 320 K/UL — SIGNIFICANT CHANGE UP (ref 150–400)
POTASSIUM SERPL-MCNC: 4.1 MMOL/L — SIGNIFICANT CHANGE UP (ref 3.5–5.3)
POTASSIUM SERPL-SCNC: 4.1 MMOL/L — SIGNIFICANT CHANGE UP (ref 3.5–5.3)
PROT SERPL-MCNC: 8.1 G/DL — SIGNIFICANT CHANGE UP (ref 6–8.3)
RBC # BLD: 5.09 M/UL — SIGNIFICANT CHANGE UP (ref 3.8–5.2)
RBC # FLD: 12.1 % — SIGNIFICANT CHANGE UP (ref 10.3–14.5)
SODIUM SERPL-SCNC: 142 MMOL/L — SIGNIFICANT CHANGE UP (ref 135–145)
TRIGL SERPL-MCNC: 179 MG/DL — HIGH
WBC # BLD: 6.93 K/UL — SIGNIFICANT CHANGE UP (ref 3.8–10.5)
WBC # FLD AUTO: 6.93 K/UL — SIGNIFICANT CHANGE UP (ref 3.8–10.5)

## 2022-03-28 PROCEDURE — 99396 PREV VISIT EST AGE 40-64: CPT | Mod: GC

## 2022-03-29 NOTE — PHYSICAL EXAM
[Well Developed] : well developed [Normal Sclera/Conjunctiva] : normal sclera/conjunctiva [PERRL] : pupils equal round and reactive to light [No Lymphadenopathy] : no lymphadenopathy [Thyroid Normal, No Nodules] : the thyroid was normal and there were no nodules present [Clear to Auscultation] : lungs were clear to auscultation bilaterally [Normal] : normal rate, regular rhythm, normal S1 and S2 and no murmur heard [Pedal Pulses Present] : the pedal pulses are present [No Edema] : there was no peripheral edema [Soft] : abdomen soft [Non Tender] : non-tender [Grossly Normal Strength/Tone] : grossly normal strength/tone [No Rash] : no rash [No Skin Lesions] : no skin lesions

## 2022-03-29 NOTE — DISCUSSION/SUMMARY
[FreeTextEntry1] : 56 y/o P4 with PMhx of right DCIS presents for routine annual exam with no acute complaints\par \par #HCM\par - Pap March 2021 NILM HPV neg. Repeat 2026 \par - Colonoscopy refferal given\par - DEXA wnl June 2021\par \par #Breast Cancer\par - Follow up with breast surgeon for exam \par - Mammogram and ultrasound orders per note from surgery\par - Anastrozole per surgery\par \par RTC one year for annual \par GEN Enriquez PGY4\par d/w Dr. Green

## 2022-03-29 NOTE — PHYSICAL EXAM
[Appropriately responsive] : appropriately responsive [Alert] : alert [No Acute Distress] : no acute distress [No Lymphadenopathy] : no lymphadenopathy [Regular Rate Rhythm] : regular rate rhythm [No Murmurs] : no murmurs [Clear to Auscultation B/L] : clear to auscultation bilaterally [Tender] : tender [Soft] : soft [Non-tender] : non-tender [Non-distended] : non-distended [No HSM] : No HSM [No Lesions] : no lesions [No Mass] : no mass [Oriented x3] : oriented x3 [Chaperone Present] : A chaperone was present in the examining room during all aspects of the physical examination [Breast Reconstruction Right] : breast reconstruction [Labia Majora] : normal [Labia Minora] : normal [Normal] : normal [Uterine Adnexae] : normal [FreeTextEntry6] : right breast indurated,

## 2022-03-29 NOTE — REASON FOR VISIT
[[employer] ] : provided by [employer]   [Annual] : an annual visit. [Interpreters_IDNumber] : [id number]

## 2022-03-29 NOTE — HISTORY OF PRESENT ILLNESS
[FreeTextEntry1] : 56 y/o P4 LMP 10 years ago, presents for annual visit. Since last years annual visit she was diagnosed with stage IA ER, OH positive, HER 2 negative invasive ductal carcinoma of the right breast  s/p R lumpectomy in 2021. No acute complaints. Patient had Paragrd IUD in place which was removed . Denies vaginal bleeding since then, vaginal discharge. She is sexually active with her  has no sexual concerns nor STI concerns, declines STI testing today.  \par \par Pap smear: 2021 NILM HPV neg\par Mamogram: abnormal 2021 s/p lumpectomt\par Colonoscopy yearly FIT testing: Amenable to colonoscopy. \par \par Obhx:  x4\par Gynhx: Remote abnormal paps, last few have been normal \par PMhx: HTN/T2DM\par Psurghx: R lumpectomy \par Meds: Lisinopril 10 mg, Metoprolol-HCTZ, Metformin 500 mg, Anastrozole started 2021 \par All:NKDA\par Social: Lives with  and three children feels safe\par

## 2022-03-30 RX ORDER — MULTIVIT-MIN/FOLIC/VIT K/LYCOP 400-300MCG
25 MCG TABLET ORAL DAILY
Qty: 30 | Refills: 2 | Status: ACTIVE | COMMUNITY
Start: 2022-03-30 | End: 1900-01-01

## 2022-03-31 ENCOUNTER — OUTPATIENT (OUTPATIENT)
Dept: OUTPATIENT SERVICES | Facility: HOSPITAL | Age: 57
LOS: 1 days | End: 2022-03-31
Payer: MEDICAID

## 2022-03-31 DIAGNOSIS — Z98.890 OTHER SPECIFIED POSTPROCEDURAL STATES: Chronic | ICD-10-CM

## 2022-03-31 DIAGNOSIS — R00.1 BRADYCARDIA, UNSPECIFIED: ICD-10-CM

## 2022-03-31 PROCEDURE — 93227 XTRNL ECG REC<48 HR R&I: CPT

## 2022-04-01 DIAGNOSIS — R00.2 PALPITATIONS: ICD-10-CM

## 2022-04-01 DIAGNOSIS — E11.9 TYPE 2 DIABETES MELLITUS WITHOUT COMPLICATIONS: ICD-10-CM

## 2022-04-01 DIAGNOSIS — I10 ESSENTIAL (PRIMARY) HYPERTENSION: ICD-10-CM

## 2022-04-01 DIAGNOSIS — Z23 ENCOUNTER FOR IMMUNIZATION: ICD-10-CM

## 2022-04-01 DIAGNOSIS — Z00.00 ENCOUNTER FOR GENERAL ADULT MEDICAL EXAMINATION WITHOUT ABNORMAL FINDINGS: ICD-10-CM

## 2022-04-01 NOTE — ASSESSMENT
[FreeTextEntry1] : Patient is a 58 y/o female, PMH of HTN, DM, GERD multinodular goiter, hepatic steatosis, DCIS of R. breast s/p lumpectomy, radiation, on anastrazole daily.\par \par #Palpitations\par - EKG today showed normal sinus\par - Possibly related to delayed dose of metoprolol, anxiety, ischemic heart disease (increased risk with anastrazole), hyperthyroidism\par - Increased metoprolol to 75mg BID, prescribed holter monitor\par \par #HTN\par - BPs typically elevated at home- in 130s\par - BP not well controlled today, manual repeat was 141/90.\par - Increased metoprolol to 75mg BID \par - C/w lisinopril 20, HCTZ 25mg daily\par - Patient instructed to keep BP log\par \par #Type 2 DM\par - Sugars range 130s-150s, but patient does not check consistently\par - A1c today\par - C/w metformin 500mg daily\par - Patient instructed to keep blood sugar log\par \par #HCM\par - PCV 20 today\par - Shingrix prescribed, patient will contact pharmacy to see if covered with insurance. Will schedule for nurse visit if it does\par - RTC in 5 weeks to monitor palpitations, HTN

## 2022-04-01 NOTE — REVIEW OF SYSTEMS
[Hot Flashes] : hot flashes [Palpitations] : palpitations [Negative] : Musculoskeletal [FreeTextEntry2] : Hot flashes since taking anastrazole

## 2022-04-01 NOTE — HISTORY OF PRESENT ILLNESS
[FreeTextEntry1] : Patient here for CPE [Interpreters_IDNumber] : [id number] [Interpreters_FullName] : Juve [TWNoteComboBox1] : Senegalese [de-identified] : Patient is a 58 y/o female, PMH of HTN, DM, GERD multinodular goiter, hepatic steatosis, DCIS of R. breast s/p lumpectomy, radiation, on anastrazole daily.\par \par Patient states that she has intermittent palpitations, typically at night, usually ~once weekly. Patient unsure how long this has occurred. She believes it may have started around January. She denies chest pain, shortness of breath, orthopnea, abdominal pain, lightheadedness, dizziness, changes in vision. Patient checks her BP every 2-3 days, typically around 130-170s/70s, but patient states that she sometimes checks BP after exercise. \par \par Patient also checks blood sugars every 2-3 days. They typically range around 130s-150s. Patient states she eats a lot of carbs. She denies symptoms of hypo or hyperglycemia, including diaphoresis, headaches, lightheadedness, changes in vision, abdominal pain, N/V, polyuria, polydipsia, polyphagia. \par \par Patient states that she tolerated treatment for her breast cancer without issues.

## 2022-04-01 NOTE — HEALTH RISK ASSESSMENT
[Very Good] : ~his/her~ current health as very good [Good] : ~his/her~  mood as  good [Former] : Former [No] : No [PHQ-2 Negative - No further assessment needed] : PHQ-2 Negative - No further assessment needed [de-identified] : smoked for 3-4 years, 1/2 pack daily. Quit 23 years ago

## 2022-04-05 ENCOUNTER — NON-APPOINTMENT (OUTPATIENT)
Age: 57
End: 2022-04-05

## 2022-04-05 RX ORDER — METOPROLOL TARTRATE 75 MG/1
75 TABLET, FILM COATED ORAL
Qty: 30 | Refills: 2 | Status: DISCONTINUED | COMMUNITY
Start: 2022-03-28 | End: 2022-04-05

## 2022-04-11 ENCOUNTER — OUTPATIENT (OUTPATIENT)
Dept: OUTPATIENT SERVICES | Facility: HOSPITAL | Age: 57
LOS: 1 days | Discharge: HOME | End: 2022-04-11

## 2022-04-11 DIAGNOSIS — Z98.890 OTHER SPECIFIED POSTPROCEDURAL STATES: Chronic | ICD-10-CM

## 2022-04-11 DIAGNOSIS — C50.919 MALIGNANT NEOPLASM OF UNSPECIFIED SITE OF UNSPECIFIED FEMALE BREAST: ICD-10-CM

## 2022-04-12 ENCOUNTER — APPOINTMENT (OUTPATIENT)
Dept: ULTRASOUND IMAGING | Facility: IMAGING CENTER | Age: 57
End: 2022-04-12
Payer: MEDICAID

## 2022-04-12 ENCOUNTER — APPOINTMENT (OUTPATIENT)
Dept: MAMMOGRAPHY | Facility: IMAGING CENTER | Age: 57
End: 2022-04-12
Payer: MEDICAID

## 2022-04-12 ENCOUNTER — OUTPATIENT (OUTPATIENT)
Dept: OUTPATIENT SERVICES | Facility: HOSPITAL | Age: 57
LOS: 1 days | End: 2022-04-12
Payer: MEDICAID

## 2022-04-12 ENCOUNTER — RESULT REVIEW (OUTPATIENT)
Age: 57
End: 2022-04-12

## 2022-04-12 DIAGNOSIS — Z00.8 ENCOUNTER FOR OTHER GENERAL EXAMINATION: ICD-10-CM

## 2022-04-12 DIAGNOSIS — Z98.890 OTHER SPECIFIED POSTPROCEDURAL STATES: Chronic | ICD-10-CM

## 2022-04-12 PROCEDURE — 77066 DX MAMMO INCL CAD BI: CPT | Mod: 26

## 2022-04-12 PROCEDURE — 77066 DX MAMMO INCL CAD BI: CPT

## 2022-04-12 PROCEDURE — 76641 ULTRASOUND BREAST COMPLETE: CPT

## 2022-04-12 PROCEDURE — 77062 BREAST TOMOSYNTHESIS BI: CPT | Mod: 26

## 2022-04-12 PROCEDURE — G0279: CPT

## 2022-04-12 PROCEDURE — 76641 ULTRASOUND BREAST COMPLETE: CPT | Mod: 26,50

## 2022-04-20 ENCOUNTER — APPOINTMENT (OUTPATIENT)
Dept: SURGICAL ONCOLOGY | Facility: CLINIC | Age: 57
End: 2022-04-20
Payer: MEDICAID

## 2022-04-20 VITALS
HEIGHT: 65 IN | DIASTOLIC BLOOD PRESSURE: 86 MMHG | BODY MASS INDEX: 33.32 KG/M2 | HEART RATE: 68 BPM | OXYGEN SATURATION: 98 % | RESPIRATION RATE: 16 BRPM | WEIGHT: 200 LBS | TEMPERATURE: 97.4 F | SYSTOLIC BLOOD PRESSURE: 145 MMHG

## 2022-04-20 PROCEDURE — 99213 OFFICE O/P EST LOW 20 MIN: CPT

## 2022-04-20 PROCEDURE — T1013: CPT

## 2022-04-20 NOTE — HISTORY OF PRESENT ILLNESS
[FreeTextEntry1] : The patient is a 57 year old female with R breast IDC, moderately differentiated, DCIS intermediate grade, ER/CT positive, HER2 negative s/p R lumpectomy SLNB 5/17/2021, 1.5 cm IDC, grade 2; 7 mm DCIS, low to intermediate grade. Margins negative. 0/3 SLNB. pT1cN0.  Pt presented to the ED 7/11/21 with drainage from the right breast while in the middle of radiation. Managed with serial aspirations and po antibiotics. \par \par Prior imaging:\par 3/19/2021 B/L SM (NW) revealed scattered fibroglandular densities. TC 18.3%\par  - L upper breast ribbon clip\par  - R outer/upper outer breast 2.5 cm mass, N5\par  - R lateral to mass 6 mm cluster of calcs\par \par 3/24/2021 R DM \par  - R upper outer breast 1.4 x 1.6 cm with heterogeneous microcalcifications\par \par 3/24/2021 R US\par  - R 10:00 N5 1.5 x 1 x 0.9 cm irregular hypoechoic mass -> BR5\par  - R ax LN wnl\par \par 3/31/2021 USG-CNB\par  - R 10:00 N5: invasive moderately diff ductal carcinoma, DCIS, intermediate grade, focal. ER >98%, CT >98%, HER2 0\par \par 4/16/2021 MRI:\par  - R 1.9 x 2.6 x 2.0 cm mass with bx clip.\par  - L breast neg\par  - No axillary or internal mammary adenopathy\par  - L thyroid lobe nodule\par \par 5/17/2021 R lumpectomy SLNB revealed 1.5 cm IDC, grade 2; 7 mm DCIS, low to intermediate grade. Margins negative. 0/3 SLNB. pT1cN0.\par \par Postop:\par Pt reports that she only took Oxycodone on the first day home; otherwise, she takes Tylenol sporadically. She notes that pain is not bad unless you touch the area, and she notes having discomfort during bumps in the car. Denies any limitations in arm movement. Reports some numbness inside her arm up to her elbow and some numbness on the breast. Denies fevers. \par \par 6/30/21-7/30/2021 RT\par \par 7/21/2021 Admitted from ED for abscess.\par \par 4/12/2022 B/L DM (NW), heterogeneously dense breasts \par  - No suspicious mass, suspicious microcalcifications, or other sign of malignancy is identified. \par  - Postlumpectomy changes within the right outer breast.\par  - Stable ribbon shaped biopsy marker within the left upper outer breast.\par \par 4/12/2022 B/L US\par  - R 10:00 scar\par  - L neg\par  - BR2\par \par Interval history:\par Pt is recovering well after radiation and previous infection. She is taking Anastrazole, good compliance. But she is concerned that she is gaining weight and she is having difficulty sleeping. She takes the medication in the mornings. Otherwise, she notes some residual discomfort in the axilla and breast. She notes the breast is still hard. She has sensitivity in the axilla. No lumps. No nipple discharge. No fevers.\par \par \par

## 2022-04-20 NOTE — REASON FOR VISIT
[Follow-Up: _____] : a [unfilled] follow-up visit [[employer] ] : provided by [employer]   [Time Spent: ____ minutes] : Total time spent using  services: [unfilled] minutes. The patient's primary language is not English thus required  services. [Interpreters_IDNumber] : [id number] [Interpreters_FullName] : Shahram [TWNoteComboBox1] : Djib[place]ian

## 2022-04-20 NOTE — CONSULT LETTER
[Dear  ___] : Dear  [unfilled], [Courtesy Letter:] : I had the pleasure of seeing your patient, [unfilled], in my office today. [Please see my note below.] : Please see my note below. [Consult Closing:] : Thank you very much for allowing me to participate in the care of this patient.  If you have any questions, please do not hesitate to contact me. [Sincerely,] : Sincerely, [FreeTextEntry3] : Milla Mena MD\par Breast Surgeon\par Division of Surgical Oncology\par Department of Surgery\par [address] [place] [address]\[place] [address] \par Tel: [phone]\par Fax: [phone]\par Email: [email or web address]

## 2022-04-20 NOTE — PHYSICAL EXAM
[Normocephalic] : normocephalic [Atraumatic] : atraumatic [EOMI] : extra ocular movement intact [PERRL] : pupils equal, round and reactive to light [Sclera nonicteric] : sclera nonicteric [Supple] : supple [No Supraclavicular Adenopathy] : no supraclavicular adenopathy [No Cervical Adenopathy] : no cervical adenopathy [Examined in the supine and seated position] : examined in the supine and seated position [Asymmetrical] : asymmetrical [Grade 1] : Ptosis Grade 1 [No dominant masses] : no dominant masses in right breast  [No dominant masses] : no dominant masses left breast [No Nipple Retraction] : no left nipple retraction [No Nipple Discharge] : no left nipple discharge [Breast Mass Right Breast ___cm] : no masses [Breast Mass Left Breast ___cm] : no masses [Breast Nipple Inversion] : nipples not inverted [Breast Nipple Retraction] : nipples not retracted [Breast Nipple Flattening] : nipples not flattened [Breast Nipple Fissures] : nipples not fissured [Breast Abnormal Lactation (Galactorrhea)] : no galactorrhea [Breast Abnormal Secretion Bloody Fluid] : no bloody discharge [Breast Abnormal Secretion Serous Fluid] : no serous discharge [Breast Abnormal Secretion Opalescent Fluid] : no milky discharge [No Axillary Lymphadenopathy] : no right axillary lymphadenopathy [No Edema] : no edema [No Swelling] : no swelling [Full ROM] : full range of motion [de-identified] : non-labored respirations  [de-identified] : hyperpigmented spots over areola [de-identified] : axillary incision closed, well-healed. Hyperpigmentation improving. No erythema. No fluctuance. Breast also with some hyperpigmentation, but no erythema. Incision well-healed. Induration noted, no fluctuance

## 2022-04-20 NOTE — REVIEW OF SYSTEMS
[Recent Weight Gain (___ Lbs)] : recent [unfilled] ~Ulb weight gain [Breast Pain] : breast pain [Sleep Disturbances] : sleep disturbances [Negative] : Heme/Lymph

## 2022-04-20 NOTE — ASSESSMENT
[FreeTextEntry1] : The patient is a 57 year old female with R breast IDC, moderately differentiated, DCIS intermediate grade, ER/NC positive, HER2 negative s/p R lumpectomy SLNB 5/17/2021, 1.5 cm IDC, grade 2; 7 mm DCIS, low to intermediate grade. Margins negative. 0/3 SLNB. pT1cN0.  Pt presented to the ED 7/11/21 with drainage from the right breast while in the middle of radiation. Managed with serial aspirations and po antibiotics. now s/p adjuvant RT, currently on Anastrazole.\par \par Most recent imaging reviewed today 4/12/2022 B/L mmg and US, BR2.\par \par Exam today shows well-healed incisions. Still some induration in the breast and hyperpigmentation from radiation. No masses noted.\par \par Plan:\par  - f/u med onc, continue anastrazole\par  - Next screening MRI 10/2022\par  - RTO after MRI\par \par

## 2022-05-02 ENCOUNTER — OUTPATIENT (OUTPATIENT)
Dept: OUTPATIENT SERVICES | Facility: HOSPITAL | Age: 57
LOS: 1 days | End: 2022-05-02

## 2022-05-02 ENCOUNTER — APPOINTMENT (OUTPATIENT)
Dept: INTERNAL MEDICINE | Facility: CLINIC | Age: 57
End: 2022-05-02
Payer: MEDICAID

## 2022-05-02 VITALS
OXYGEN SATURATION: 98 % | HEIGHT: 65 IN | SYSTOLIC BLOOD PRESSURE: 126 MMHG | TEMPERATURE: 97.6 F | HEART RATE: 81 BPM | WEIGHT: 200 LBS | BODY MASS INDEX: 33.32 KG/M2 | DIASTOLIC BLOOD PRESSURE: 74 MMHG | RESPIRATION RATE: 16 BRPM

## 2022-05-02 DIAGNOSIS — Z98.890 OTHER SPECIFIED POSTPROCEDURAL STATES: Chronic | ICD-10-CM

## 2022-05-02 DIAGNOSIS — Z01.419 ENCOUNTER FOR GYNECOLOGICAL EXAMINATION (GENERAL) (ROUTINE) W/OUT ABNORMAL FINDINGS: ICD-10-CM

## 2022-05-02 DIAGNOSIS — Z30.432 ENCOUNTER FOR REMOVAL OF INTRAUTERINE CONTRACEPTIVE DEVICE: ICD-10-CM

## 2022-05-02 DIAGNOSIS — R21 RASH AND OTHER NONSPECIFIC SKIN ERUPTION: ICD-10-CM

## 2022-05-02 DIAGNOSIS — Z01.818 ENCOUNTER FOR OTHER PREPROCEDURAL EXAMINATION: ICD-10-CM

## 2022-05-02 PROCEDURE — 99213 OFFICE O/P EST LOW 20 MIN: CPT

## 2022-05-02 NOTE — PHYSICAL EXAM
[No Acute Distress] : no acute distress [Well Nourished] : well nourished [Well Developed] : well developed [Well-Appearing] : well-appearing [Normal Sclera/Conjunctiva] : normal sclera/conjunctiva [PERRL] : pupils equal round and reactive to light [EOMI] : extraocular movements intact [Normal Outer Ear/Nose] : the outer ears and nose were normal in appearance [Supple] : supple [Thyroid Normal, No Nodules] : the thyroid was normal and there were no nodules present [No Respiratory Distress] : no respiratory distress  [No Accessory Muscle Use] : no accessory muscle use [Clear to Auscultation] : lungs were clear to auscultation bilaterally [Normal Rate] : normal rate  [Regular Rhythm] : with a regular rhythm [Normal S1, S2] : normal S1 and S2 [No Murmur] : no murmur heard [No Edema] : there was no peripheral edema [Soft] : abdomen soft [Non Tender] : non-tender [Non-distended] : non-distended [Normal Bowel Sounds] : normal bowel sounds [No Joint Swelling] : no joint swelling [Grossly Normal Strength/Tone] : grossly normal strength/tone [No Rash] : no rash [Coordination Grossly Intact] : coordination grossly intact [No Focal Deficits] : no focal deficits [Normal Gait] : normal gait [Normal Affect] : the affect was normal [Normal Insight/Judgement] : insight and judgment were intact

## 2022-05-06 ENCOUNTER — RESULT REVIEW (OUTPATIENT)
Age: 57
End: 2022-05-06

## 2022-05-06 ENCOUNTER — APPOINTMENT (OUTPATIENT)
Dept: HEMATOLOGY ONCOLOGY | Facility: CLINIC | Age: 57
End: 2022-05-06
Payer: MEDICAID

## 2022-05-06 VITALS
RESPIRATION RATE: 16 BRPM | HEIGHT: 65 IN | HEART RATE: 67 BPM | WEIGHT: 200.84 LBS | OXYGEN SATURATION: 99 % | TEMPERATURE: 97.5 F | DIASTOLIC BLOOD PRESSURE: 83 MMHG | SYSTOLIC BLOOD PRESSURE: 157 MMHG | BODY MASS INDEX: 33.46 KG/M2

## 2022-05-06 LAB
BASOPHILS # BLD AUTO: 0.09 K/UL — SIGNIFICANT CHANGE UP (ref 0–0.2)
BASOPHILS NFR BLD AUTO: 1.1 % — SIGNIFICANT CHANGE UP (ref 0–2)
EOSINOPHIL # BLD AUTO: 0.51 K/UL — HIGH (ref 0–0.5)
EOSINOPHIL NFR BLD AUTO: 6.3 % — HIGH (ref 0–6)
HCT VFR BLD CALC: 41 % — SIGNIFICANT CHANGE UP (ref 34.5–45)
HGB BLD-MCNC: 13.7 G/DL — SIGNIFICANT CHANGE UP (ref 11.5–15.5)
IMM GRANULOCYTES NFR BLD AUTO: 0.2 % — SIGNIFICANT CHANGE UP (ref 0–1.5)
LYMPHOCYTES # BLD AUTO: 2.06 K/UL — SIGNIFICANT CHANGE UP (ref 1–3.3)
LYMPHOCYTES # BLD AUTO: 25.5 % — SIGNIFICANT CHANGE UP (ref 13–44)
MCHC RBC-ENTMCNC: 28.2 PG — SIGNIFICANT CHANGE UP (ref 27–34)
MCHC RBC-ENTMCNC: 33.4 G/DL — SIGNIFICANT CHANGE UP (ref 32–36)
MCV RBC AUTO: 84.4 FL — SIGNIFICANT CHANGE UP (ref 80–100)
MONOCYTES # BLD AUTO: 0.62 K/UL — SIGNIFICANT CHANGE UP (ref 0–0.9)
MONOCYTES NFR BLD AUTO: 7.7 % — SIGNIFICANT CHANGE UP (ref 2–14)
NEUTROPHILS # BLD AUTO: 4.77 K/UL — SIGNIFICANT CHANGE UP (ref 1.8–7.4)
NEUTROPHILS NFR BLD AUTO: 59.2 % — SIGNIFICANT CHANGE UP (ref 43–77)
NRBC # BLD: 0 /100 WBCS — SIGNIFICANT CHANGE UP (ref 0–0)
PLATELET # BLD AUTO: 312 K/UL — SIGNIFICANT CHANGE UP (ref 150–400)
RBC # BLD: 4.86 M/UL — SIGNIFICANT CHANGE UP (ref 3.8–5.2)
RBC # FLD: 12 % — SIGNIFICANT CHANGE UP (ref 10.3–14.5)
WBC # BLD: 8.07 K/UL — SIGNIFICANT CHANGE UP (ref 3.8–10.5)
WBC # FLD AUTO: 8.07 K/UL — SIGNIFICANT CHANGE UP (ref 3.8–10.5)

## 2022-05-06 PROCEDURE — 99214 OFFICE O/P EST MOD 30 MIN: CPT

## 2022-05-06 NOTE — CONSULT LETTER
[Please see my note below.] : Please see my note below. [Consult Closing:] : Thank you very much for allowing me to participate in the care of this patient.  If you have any questions, please do not hesitate to contact me. [Sincerely,] : Sincerely, [FreeTextEntry3] : Camila Max MD\par Division of Medical Oncology and Hematology\par [facility] [place]\par [employer] [facility]\par [place], [place]\par \par

## 2022-05-06 NOTE — PHYSICAL EXAM
[Fully active, able to carry on all pre-disease performance without restriction] : Status 0 - Fully active, able to carry on all pre-disease performance without restriction [Normal] : affect appropriate [de-identified] : Post RT skin changes noted, right lumpectomy scar healing well

## 2022-05-06 NOTE — HISTORY OF PRESENT ILLNESS
[de-identified] : Ms. SHANTAL HANSON is a 56 year old female here for an evaluation of breast cancer. Her oncologic history is as follows:\par \par Screening mammo 3/19/2021: R outer/upper outer breast 2.5 cm mass and R lateral to mass 6 mm cluster of calcs. Diagnostic right mammo 3/24/21 showed R upper outer breast mass 1.4 x 1.6 cm with heterogeneous microcalcifications. Sono:mass at 10:00: 1.5 x 1 x 0.9 cm irregular hypoechoic mass. \par \par 3/31/2021: Right breast biopsy 10:00: invasive moderately diff ductal carcinoma, DCIS, intermediate grade, focal. ER >98%, OR >98%, HER2 negative\par \par 4/16/2021 MRI: concordant with known malignancy right breast. No suspicious findings in left breast\par \par 5/17/2021: R lumpectomy and SLNB revealed 1.5 cm IDC, grade 2; 7 mm DCIS, low to intermediate grade. Margins negative(Fragmented nature of the final margin specimens preclude assessment of the distance). 0/3 SLNB. ER : Positive, > 98%, PgR : Positive, > 98%, HER2: Negative. pT1cN0, under care of Dr Mena.\par \par  Oncotype DX 9\par \par LMP 10 years ago [de-identified] : Ms. SHANTAL HANSON  is here for a follow up appt for stage IA (T1c, N0, M0) ER positive, NJ positive, HER-2/katarzyna negative invasive ductal carcinoma of the right breast. ODX 9. Completed RT 9/7/21. Started anastrozole 9/22/21 \par \par Takes Anastrozole daily, good compliance. She reports hot flashes,  denies aches/pain, vag dryness, excessive fatigue, GI s/e, hair loss. She is active, no change in energy, wt or appetite. \par mammogram - 4/2022 birads 2\par DEXA-  06/23/2021 DEXA Normal.  She takes ca+vit \par  [Date: ____________] : Patient's last distress assessment performed on [unfilled]. [7 - Distress Level] : Distress Level: 7 [Patient given social work contact information and resource sheet] : Patient was given social work contact information and resource sheet

## 2022-05-06 NOTE — REASON FOR VISIT
[Follow-Up Visit] : a follow-up [Other: _____] : [unfilled] [[employer] ] : provided by [employer]   [FreeTextEntry2] : breast ca, odx 9 [FreeTextEntry3] : sarah grimes, [id number], Slovenian

## 2022-05-10 LAB
25(OH)D3 SERPL-MCNC: 31.9 NG/ML
ALBUMIN SERPL ELPH-MCNC: 4.5 G/DL
ALP BLD-CCNC: 62 U/L
ALT SERPL-CCNC: 39 U/L
ANION GAP SERPL CALC-SCNC: 15 MMOL/L
AST SERPL-CCNC: 34 U/L
BILIRUB SERPL-MCNC: 0.2 MG/DL
BUN SERPL-MCNC: 12 MG/DL
CALCIUM SERPL-MCNC: 10.1 MG/DL
CHLORIDE SERPL-SCNC: 105 MMOL/L
CO2 SERPL-SCNC: 22 MMOL/L
CREAT SERPL-MCNC: 0.64 MG/DL
EGFR: 104 ML/MIN/1.73M2
GLUCOSE SERPL-MCNC: 136 MG/DL
POTASSIUM SERPL-SCNC: 3.8 MMOL/L
PROT SERPL-MCNC: 7.5 G/DL
SODIUM SERPL-SCNC: 141 MMOL/L

## 2022-05-11 PROBLEM — Z30.432 ENCOUNTER FOR IUD REMOVAL: Status: RESOLVED | Noted: 2021-09-18 | Resolved: 2022-05-11

## 2022-05-11 NOTE — HISTORY OF PRESENT ILLNESS
[FreeTextEntry1] : Follow-up on palpitations, blood pressure, and diabetes [de-identified] : Patient is a 57 year-old female with HTN, DM, GERD, multinodular goiter, hepatic steatosis, DCIS of R. Breast s/p lumpectomy, radiation, and now on anastrazole daily presenting for a follow-up appointment on palpitations, blood pressure, and diabetes. \par \par In regards to her palpitations, patient states she still has intermittent palpitations, usually at night and around once a week. These have been occurring since January of this year. She denies having headaches, dizziness, chest pain, shortness of breath, dyspnea, abdominal pain, nor changes in vision. Since her last visit, patient states she has had a Holter monitor placed for 24 hours and removed but has not received any of the results (she states they told her they were supposed to call her with results). \par \par In regards to her blood pressure, patient checks every 2-3 days and the blood pressure averages 120-130s systolic since her last visit after her metoprolol was increased. \par \par In regards to her diabetes, patient checks her sugars every 2-3 days at random times. They typically range around 130-150s and patient endorses she eats a lot of carbs. She also has been having difficulty sleeping since being on the anastrazole, sometimes  sleeping for only 3 hours a night. She denies having headaches, dizziness, nausea/vomiting, abdominal pain, nor polyuria or polydypsia.

## 2022-05-11 NOTE — ASSESSMENT
[FreeTextEntry1] : Patient is a 57 year-old female with HTN, DM, GERD, multinodular goiter, hepatic steatosis, DCIS of R. Breast s/p lumpectomy, radiation, and now on anastrazole daily presenting for a follow-up appointment on palpitations, blood pressure, and diabetes. \par \par Assessment & Plan as above with additions below:\par \par HCM\par - Patient received PCV 20 during last visit\par - Has not received Shingrix yet\par \par RTC in 5 weeks for follow-up appointment T2DM and to obtain A1c as well as palpitations s/p Holter monitor results. \par Case discussed with Dr. Ross.\par \par Roger Paul, PGY-2\par Internal Medicine, [facility]

## 2022-05-11 NOTE — REVIEW OF SYSTEMS
[Palpitations] : palpitations [Fever] : no fever [Chills] : no chills [Fatigue] : no fatigue [Recent Change In Weight] : ~T no recent weight change [Pain] : no pain [Vision Problems] : no vision problems [Earache] : no earache [Hearing Loss] : no hearing loss [Sore Throat] : no sore throat [Chest Pain] : no chest pain [Lower Ext Edema] : no lower extremity edema [Orthopnea] : no orthopnea [Shortness Of Breath] : no shortness of breath [Wheezing] : no wheezing [Cough] : no cough [Dyspnea on Exertion] : no dyspnea on exertion [Abdominal Pain] : no abdominal pain [Nausea] : no nausea [Constipation] : no constipation [Diarrhea] : diarrhea [Dysuria] : no dysuria [Vomiting] : no vomiting [Incontinence] : no incontinence [Frequency] : no frequency [Joint Pain] : no joint pain [Joint Stiffness] : no joint stiffness [Itching] : no itching [Skin Rash] : no skin rash [Headache] : no headache [Dizziness] : no dizziness [Anxiety] : no anxiety [Depression] : no depression

## 2022-05-12 DIAGNOSIS — R00.2 PALPITATIONS: ICD-10-CM

## 2022-05-12 DIAGNOSIS — I10 ESSENTIAL (PRIMARY) HYPERTENSION: ICD-10-CM

## 2022-05-12 DIAGNOSIS — E11.9 TYPE 2 DIABETES MELLITUS WITHOUT COMPLICATIONS: ICD-10-CM

## 2022-05-12 DIAGNOSIS — K21.9 GASTRO-ESOPHAGEAL REFLUX DISEASE WITHOUT ESOPHAGITIS: ICD-10-CM

## 2022-05-27 NOTE — PATIENT PROFILE ADULT - FLU SEASON?
E-VISIT PROGRESS NOTE    HISTORY    The patient is a 52 year old female who is requesting an asynchronous E-Visit for evaluation of urinary tract condition. Dysuria, frequency and urgency without fevers, flank pain or vaginal symptoms. Has history of UTI. No antibiotics in past 3 months.     The patient's responses to the questions contained in the condition-specific questionnaire submission were reviewed.    MEDICATIONS  The medication list in the medical record as well as updates to the medication list submitted by the patient with this E-Visit were reviewed.      ALLERGIES  Allergies as of 05/27/2022 - Reviewed 05/27/2022   Allergen Reaction Noted   • Amoxicillin HIVES 07/31/2002   • Seasonal Runny Nose 11/07/2005       HISTORIES  I have personally reviewed and updated the following electronic medical record sections: Current medications, Allergies, Problem list, Past Medical History, Past Surgical History, Social History and Family History      ASSESSMENT/PLAN  Acute cystitis without hematuria  - nitrofurantoin, macrocrystal-monohydrate, (Macrobid) 100 MG capsule; Take 1 capsule by mouth 2 times daily for 5 days.  Dispense: 10 capsule; Refill: 0  Given constellation of symptoms reported by patient, Acute Simple Cystitis is likely at a 90% probability. Follow up with PCP if symptoms unresolved at end of antibiotic course.    No

## 2022-06-07 ENCOUNTER — OUTPATIENT (OUTPATIENT)
Dept: OUTPATIENT SERVICES | Facility: HOSPITAL | Age: 57
LOS: 1 days | End: 2022-06-07

## 2022-06-07 ENCOUNTER — APPOINTMENT (OUTPATIENT)
Dept: INTERNAL MEDICINE | Facility: CLINIC | Age: 57
End: 2022-06-07
Payer: MEDICAID

## 2022-06-07 VITALS
DIASTOLIC BLOOD PRESSURE: 75 MMHG | SYSTOLIC BLOOD PRESSURE: 157 MMHG | TEMPERATURE: 96.8 F | WEIGHT: 199 LBS | BODY MASS INDEX: 33.15 KG/M2 | HEART RATE: 65 BPM | HEIGHT: 65 IN | OXYGEN SATURATION: 98 %

## 2022-06-07 DIAGNOSIS — Z98.890 OTHER SPECIFIED POSTPROCEDURAL STATES: Chronic | ICD-10-CM

## 2022-06-07 PROCEDURE — 99214 OFFICE O/P EST MOD 30 MIN: CPT | Mod: GC

## 2022-06-07 RX ORDER — ONDANSETRON 4 MG/1
4 TABLET ORAL 3 TIMES DAILY
Qty: 42 | Refills: 0 | Status: DISCONTINUED | COMMUNITY
Start: 2021-07-06 | End: 2022-06-07

## 2022-06-07 RX ORDER — SIMETHICONE 125 MG/1
125 TABLET, CHEWABLE ORAL
Qty: 90 | Refills: 1 | Status: DISCONTINUED | COMMUNITY
Start: 2021-03-12 | End: 2022-06-07

## 2022-06-07 RX ORDER — HYDROCHLOROTHIAZIDE 25 MG/1
25 TABLET ORAL
Qty: 90 | Refills: 1 | Status: DISCONTINUED | COMMUNITY
Start: 2019-05-15 | End: 2022-06-07

## 2022-06-08 LAB
ESTIMATED AVERAGE GLUCOSE: 157 MG/DL
HBA1C MFR BLD HPLC: 7.1 %
TSH SERPL-ACNC: 0.77 UIU/ML

## 2022-06-08 NOTE — PHYSICAL EXAM
[No Acute Distress] : no acute distress [Well Nourished] : well nourished [Well Developed] : well developed [Normal Sclera/Conjunctiva] : normal sclera/conjunctiva [PERRL] : pupils equal round and reactive to light [EOMI] : extraocular movements intact [Normal Outer Ear/Nose] : the outer ears and nose were normal in appearance [Normal Oropharynx] : the oropharynx was normal [No JVD] : no jugular venous distention [No Lymphadenopathy] : no lymphadenopathy [Supple] : supple [No Respiratory Distress] : no respiratory distress  [No Accessory Muscle Use] : no accessory muscle use [Clear to Auscultation] : lungs were clear to auscultation bilaterally [Normal Rate] : normal rate  [Regular Rhythm] : with a regular rhythm [Normal S1, S2] : normal S1 and S2 [No Murmur] : no murmur heard [No Carotid Bruits] : no carotid bruits [No Edema] : there was no peripheral edema [Soft] : abdomen soft [Non Tender] : non-tender [Non-distended] : non-distended [No HSM] : no HSM [Normal Posterior Cervical Nodes] : no posterior cervical lymphadenopathy [Normal Anterior Cervical Nodes] : no anterior cervical lymphadenopathy [No CVA Tenderness] : no CVA  tenderness [No Joint Swelling] : no joint swelling [Grossly Normal Strength/Tone] : grossly normal strength/tone [No Rash] : no rash [Coordination Grossly Intact] : coordination grossly intact [No Focal Deficits] : no focal deficits [Speech Grossly Normal] : speech grossly normal [Normal Affect] : the affect was normal [Alert and Oriented x3] : oriented to person, place, and time [Normal Mood] : the mood was normal [Normal Insight/Judgement] : insight and judgment were intact

## 2022-06-10 DIAGNOSIS — C50.919 MALIGNANT NEOPLASM OF UNSPECIFIED SITE OF UNSPECIFIED FEMALE BREAST: ICD-10-CM

## 2022-06-10 DIAGNOSIS — E11.9 TYPE 2 DIABETES MELLITUS WITHOUT COMPLICATIONS: ICD-10-CM

## 2022-06-10 DIAGNOSIS — I10 ESSENTIAL (PRIMARY) HYPERTENSION: ICD-10-CM

## 2022-06-10 DIAGNOSIS — R00.2 PALPITATIONS: ICD-10-CM

## 2022-06-10 NOTE — REVIEW OF SYSTEMS
[Palpitations] : palpitations [Fever] : no fever [Chills] : no chills [Fatigue] : no fatigue [Night Sweats] : no night sweats [Discharge] : no discharge [Pain] : no pain [Vision Problems] : no vision problems [Earache] : no earache [Hearing Loss] : no hearing loss [Nosebleed] : no nosebleeds [Sore Throat] : no sore throat [Chest Pain] : no chest pain [Lower Ext Edema] : no lower extremity edema [Orthopnea] : no orthopnea [Shortness Of Breath] : no shortness of breath [Wheezing] : no wheezing [Cough] : no cough [Dyspnea on Exertion] : no dyspnea on exertion [Abdominal Pain] : no abdominal pain [Nausea] : no nausea [Constipation] : no constipation [Diarrhea] : diarrhea [Vomiting] : no vomiting [Heartburn] : no heartburn [Dysuria] : no dysuria [Incontinence] : no incontinence [Hematuria] : no hematuria [Frequency] : no frequency [Joint Pain] : no joint pain [Joint Stiffness] : no joint stiffness [Muscle Pain] : no muscle pain [Itching] : no itching [Skin Rash] : no skin rash [Headache] : no headache [Dizziness] : no dizziness [Fainting] : no fainting [Suicidal] : not suicidal [Anxiety] : no anxiety [Depression] : no depression

## 2022-06-10 NOTE — HISTORY OF PRESENT ILLNESS
[FreeTextEntry1] : Follow up for chronic conditions [de-identified] : Patient is a 57 year-old female with HTN, DM, GERD, multinodular goiter, hepatic steatosis, DCIS of R. Breast s/p lumpectomy/radiation, and now on anastrazole daily presenting for a follow-up appointment on palpitations, blood pressure, and diabetes. \par \par Patient reportedly has been experiencing "palpitations" for approximately 6 months. Upon further clarification, patient explained that the palpitations that she has been experiencing are felt mostly on the sides of her throat. She reports that she doesn’t feel a fast heartbeat, but more of a "strong" pulse in her throat. These episodes occur 2-3 times per week, although she feels as though these episodes are becoming less frequent. They last for about 20-30 minutes and don’t have any inciting factors, often occurring at rest. She denies any recent stressors or worsening anxiety. Patient was previously prescribed a holter monitor, had it placed and removed at [facility] however no results have been available and the patient has also been unable to obtain any results. Previous EKGs performed in office have all shown NSR. She otherwise denies any other associated symptoms during these episodes. She denies any lightheadedness, CP, or SOB.\par \par In terms of her HTN, Ms. Sepulveda reports that she has been taking her BP meds as prescribed. At home her BPs have been ranging from 130-170 systolics. She has not had any headaches or vision changes. She remains on metformin for her DM.

## 2022-06-10 NOTE — ASSESSMENT
[FreeTextEntry1] : HCM\par - Patient received PCV 20 during last visit\par - Has not received Shingrix yet\par \par RTC in 3 months for BP monitoring and medication adjustment.\par \par Case discussed with Dr. Jackson\par \par MI, [facility]

## 2022-06-17 ENCOUNTER — APPOINTMENT (OUTPATIENT)
Dept: RADIATION ONCOLOGY | Facility: CLINIC | Age: 57
End: 2022-06-17
Payer: MEDICAID

## 2022-06-17 VITALS
DIASTOLIC BLOOD PRESSURE: 88 MMHG | HEIGHT: 65 IN | TEMPERATURE: 97.88 F | OXYGEN SATURATION: 99 % | RESPIRATION RATE: 18 BRPM | HEART RATE: 71 BPM | BODY MASS INDEX: 32.95 KG/M2 | WEIGHT: 197.75 LBS | SYSTOLIC BLOOD PRESSURE: 154 MMHG

## 2022-06-17 DIAGNOSIS — Z92.3 PERSONAL HISTORY OF IRRADIATION: ICD-10-CM

## 2022-06-17 DIAGNOSIS — Z92.29 PERSONAL HISTORY OF OTHER DRUG THERAPY: ICD-10-CM

## 2022-06-17 DIAGNOSIS — U07.1 COVID-19: ICD-10-CM

## 2022-06-17 PROCEDURE — 99212 OFFICE O/P EST SF 10 MIN: CPT

## 2022-06-17 NOTE — REASON FOR VISIT
[Routine Follow-Up] : routine follow-up visit for [Breast Cancer] : breast cancer [[employer] ] : provided by [employer]   [Interpreters_IDNumber] : [id number] [Interpreters_FullName] : Huber  [TWNoteComboBox1] : Indonesian

## 2022-06-17 NOTE — HISTORY OF PRESENT ILLNESS
[FreeTextEntry1] : Ms. Sepulveda is a 58 yo Wolof speaking female who is being seen for follow up visit.  She is unaccompanied for today's visit. \par \par Diagnosis:  RIGHT upper outer breast intraductal carcinoma  pT1c(1.5 cm) N0 (0/3), grade 2, with associated DCIS(7mm) intermediate grade, ER+MO+ HER2 negative,  Oncotype DX score 9\par \par Oncologic Management:\par 3/31/2021 -  Right breast biopsy 10:00: invasive moderately diff ductal carcinoma, DCIS, intermediate grade, focal. ER >98%, MO >98%, HER2 negative\par \par 5/17/2021 -  RIGHT lumpectomy and SLNB revealed 1.5 cm IDC, grade 2; 7 mm DCIS, low to intermediate grade. Margins negative(Fragmented nature of the final margin specimens preclude assessment of the distance). 0/3 SLNB. ER : Positive, > 98%, PgR : Positive, > 98%, HER2: Negative. pT1cN0, under care of Dr Mena.\par Oncotype DX score 9\par \par 6/30/21 -7/30/21:  received Radiation therapy to RIGHT Breast 4240 cGy in 16 fx, with Boost 1000 cGy in 4 fx \par Clinical course:  Tolerated the treatment well despite breast abscess during treatment\par \par 9/2021 -  started on Anastrozole (Dr. JULIANNE Max - oncology)\par \par 4/12/22 - Mammogram/US:  No mammographic or sonographic evidence of malignancy.  BIRADS 2\par \par 6/17/22 - presents for follow up visit. Ms. Sepulveda is overall feeling well, still has some mild discomfort at times on her right side of her breast.  Mild hyperpigmentation remains on the skin.  Tolerating Anastrozole, has some hot flashes at night.  \par Continues to follow with Dr. Mena (surgery) and Dr. Max (oncology).

## 2022-06-17 NOTE — REVIEW OF SYSTEMS
[Hot Flashes] : hot flashes [Negative] : Heme/Lymph [de-identified] : residual hyperpigmentation on the right breast  [Pruritus: Grade 0] : Pruritus: Grade 0 [Skin Atrophy: Grade 0] : Skin Atrophy: Grade 0 [Skin Hyperpigmentation: Grade 1 - Hyperpigmentation covering <10% BSA; no psychosocial impact] : Skin Hyperpigmentation: Grade 1 - Hyperpigmentation covering <10% BSA; no psychosocial impact [Skin Induration: Grade 1 - Mild induration, able to move skin parallel to plane (sliding) and perpendicular to skin (pinching up)] : Skin Induration: Grade 1 - Mild induration, able to move skin parallel to plane (sliding) and perpendicular to skin (pinching up) [Dermatitis Radiation: Grade 0] : Dermatitis Radiation: Grade 0

## 2022-06-17 NOTE — PHYSICAL EXAM
[Normal] : normal heart rate and rhythm, normal S1 and S2, and no murmurs present [No UE Edema] : there is no upper extremity edema [de-identified] : induration and hyperpigmentation treatment site

## 2022-08-15 ENCOUNTER — RX RENEWAL (OUTPATIENT)
Age: 57
End: 2022-08-15

## 2022-09-21 ENCOUNTER — MED ADMIN CHARGE (OUTPATIENT)
Age: 57
End: 2022-09-21

## 2022-09-21 ENCOUNTER — APPOINTMENT (OUTPATIENT)
Dept: INTERNAL MEDICINE | Facility: CLINIC | Age: 57
End: 2022-09-21

## 2022-09-21 ENCOUNTER — OUTPATIENT (OUTPATIENT)
Dept: OUTPATIENT SERVICES | Facility: HOSPITAL | Age: 57
LOS: 1 days | End: 2022-09-21

## 2022-09-21 VITALS
HEART RATE: 78 BPM | WEIGHT: 193 LBS | HEIGHT: 65 IN | OXYGEN SATURATION: 99 % | SYSTOLIC BLOOD PRESSURE: 140 MMHG | BODY MASS INDEX: 32.15 KG/M2 | TEMPERATURE: 98 F | DIASTOLIC BLOOD PRESSURE: 80 MMHG

## 2022-09-21 DIAGNOSIS — R00.2 PALPITATIONS: ICD-10-CM

## 2022-09-21 DIAGNOSIS — Z98.890 OTHER SPECIFIED POSTPROCEDURAL STATES: Chronic | ICD-10-CM

## 2022-09-21 DIAGNOSIS — K21.9 GASTRO-ESOPHAGEAL REFLUX DISEASE W/OUT ESOPHAGITIS: ICD-10-CM

## 2022-09-21 DIAGNOSIS — Z23 ENCOUNTER FOR IMMUNIZATION: ICD-10-CM

## 2022-09-21 DIAGNOSIS — M79.606 PAIN IN LEG, UNSPECIFIED: ICD-10-CM

## 2022-09-21 PROCEDURE — 99214 OFFICE O/P EST MOD 30 MIN: CPT | Mod: GC

## 2022-09-21 NOTE — PHYSICAL EXAM
[No Acute Distress] : no acute distress [Well Nourished] : well nourished [Well Developed] : well developed [Well-Appearing] : well-appearing [Normal Sclera/Conjunctiva] : normal sclera/conjunctiva [PERRL] : pupils equal round and reactive to light [No JVD] : no jugular venous distention [No Respiratory Distress] : no respiratory distress  [No Accessory Muscle Use] : no accessory muscle use [Clear to Auscultation] : lungs were clear to auscultation bilaterally [Normal Rate] : normal rate  [Regular Rhythm] : with a regular rhythm [Normal S1, S2] : normal S1 and S2 [No Murmur] : no murmur heard [No Edema] : there was no peripheral edema [Soft] : abdomen soft [Non Tender] : non-tender [Non-distended] : non-distended [Normal Bowel Sounds] : normal bowel sounds [Coordination Grossly Intact] : coordination grossly intact [Normal Gait] : normal gait [Normal Affect] : the affect was normal [de-identified] : enlarged thyroid glands symetrical  [de-identified] : Neg Ann's sign

## 2022-09-21 NOTE — HISTORY OF PRESENT ILLNESS
[FreeTextEntry1] : Follow up for chronic conditions.  [de-identified] : Patient is a 57 year-old female with HTN, DM, GERD, multinodular goiter, hepatic steatosis, DCIS of R. Breast s/p lumpectomy/radiation, and now on anastrazole daily presenting for a follow-up appointment on her chronic conditions.\par \par #Palpations \par she states that the palpations only occurs once every 15 to 20 days. Patient was previously prescribed a holter monitor, had it placed and removed at [facility] however no results have been available and the patient has also been unable to obtain any results..She denies any lightheadedness, CP, or SOB.\par \par #Leg pain\par she is about to travel in the near future, she is worried that might be caused by a clot. states the pain is on the left hip and left knee laterally. rated 7 or 8 out of 10, non radiating. tried Tylenol at home without any relieve. she can only sleep 4 hours at night partly because of the pain. \par \par #HTN\par she has been taking her home medications, and she takes BP at home regularly, recently trending just below 140 systolic pressure. denied any headaches, or hypertensive cries. \par \par #DM2\par no acute complains, consistently taking home meds for DM ie Metformin 1000mg BID\par \par #Breast Cancer\par Will go see her oncologist in Oct for a MRI of breast\par

## 2022-09-21 NOTE — REVIEW OF SYSTEMS
[Palpitations] : palpitations [Heartburn] : heartburn [Muscle Pain] : muscle pain [Negative] : Neurological [Fever] : no fever [Chills] : no chills [Fatigue] : no fatigue [Hot Flashes] : no hot flashes [Night Sweats] : no night sweats [Recent Change In Weight] : ~T no recent weight change [Chest Pain] : no chest pain [Lower Ext Edema] : no lower extremity edema [Shortness Of Breath] : no shortness of breath [Cough] : no cough [Dyspnea on Exertion] : no dyspnea on exertion [Abdominal Pain] : no abdominal pain [Diarrhea] : diarrhea [Vomiting] : no vomiting [Dysuria] : no dysuria [Frequency] : no frequency [Unsteady Walking] : no ataxia

## 2022-09-26 DIAGNOSIS — M79.604 PAIN IN RIGHT LEG: ICD-10-CM

## 2022-09-26 DIAGNOSIS — K21.9 GASTRO-ESOPHAGEAL REFLUX DISEASE WITHOUT ESOPHAGITIS: ICD-10-CM

## 2022-09-26 DIAGNOSIS — C50.919 MALIGNANT NEOPLASM OF UNSPECIFIED SITE OF UNSPECIFIED FEMALE BREAST: ICD-10-CM

## 2022-09-26 DIAGNOSIS — M79.606 PAIN IN LEG, UNSPECIFIED: ICD-10-CM

## 2022-09-26 DIAGNOSIS — E11.9 TYPE 2 DIABETES MELLITUS WITHOUT COMPLICATIONS: ICD-10-CM

## 2022-09-26 DIAGNOSIS — R00.2 PALPITATIONS: ICD-10-CM

## 2022-09-26 DIAGNOSIS — E04.2 NONTOXIC MULTINODULAR GOITER: ICD-10-CM

## 2022-09-29 ENCOUNTER — NON-APPOINTMENT (OUTPATIENT)
Age: 57
End: 2022-09-29

## 2022-10-07 ENCOUNTER — OUTPATIENT (OUTPATIENT)
Dept: OUTPATIENT SERVICES | Facility: HOSPITAL | Age: 57
LOS: 1 days | End: 2022-10-07
Payer: MEDICAID

## 2022-10-07 ENCOUNTER — APPOINTMENT (OUTPATIENT)
Dept: MRI IMAGING | Facility: IMAGING CENTER | Age: 57
End: 2022-10-07

## 2022-10-07 DIAGNOSIS — Z98.890 OTHER SPECIFIED POSTPROCEDURAL STATES: Chronic | ICD-10-CM

## 2022-10-07 DIAGNOSIS — Z85.3 PERSONAL HISTORY OF MALIGNANT NEOPLASM OF BREAST: ICD-10-CM

## 2022-10-07 DIAGNOSIS — Z00.8 ENCOUNTER FOR OTHER GENERAL EXAMINATION: ICD-10-CM

## 2022-10-07 PROCEDURE — C8908: CPT

## 2022-10-07 PROCEDURE — 77049 MRI BREAST C-+ W/CAD BI: CPT | Mod: 26

## 2022-10-07 PROCEDURE — A9585: CPT

## 2022-10-07 PROCEDURE — C8937: CPT

## 2022-10-26 ENCOUNTER — APPOINTMENT (OUTPATIENT)
Dept: SURGICAL ONCOLOGY | Facility: CLINIC | Age: 57
End: 2022-10-26

## 2022-11-08 ENCOUNTER — OUTPATIENT (OUTPATIENT)
Dept: OUTPATIENT SERVICES | Facility: HOSPITAL | Age: 57
LOS: 1 days | Discharge: HOME | End: 2022-11-08

## 2022-11-08 DIAGNOSIS — C50.919 MALIGNANT NEOPLASM OF UNSPECIFIED SITE OF UNSPECIFIED FEMALE BREAST: ICD-10-CM

## 2022-11-08 DIAGNOSIS — Z98.890 OTHER SPECIFIED POSTPROCEDURAL STATES: Chronic | ICD-10-CM

## 2022-11-09 ENCOUNTER — RX RENEWAL (OUTPATIENT)
Age: 57
End: 2022-11-09

## 2022-11-10 PROBLEM — Z85.3: Status: RESOLVED | Noted: 2021-04-09 | Resolved: 2022-04-20

## 2022-11-16 ENCOUNTER — APPOINTMENT (OUTPATIENT)
Dept: HEMATOLOGY ONCOLOGY | Facility: CLINIC | Age: 57
End: 2022-11-16

## 2022-11-16 ENCOUNTER — APPOINTMENT (OUTPATIENT)
Dept: SURGICAL ONCOLOGY | Facility: CLINIC | Age: 57
End: 2022-11-16

## 2022-11-16 ENCOUNTER — NON-APPOINTMENT (OUTPATIENT)
Age: 57
End: 2022-11-16

## 2022-11-16 VITALS
TEMPERATURE: 97.6 F | HEART RATE: 79 BPM | SYSTOLIC BLOOD PRESSURE: 142 MMHG | RESPIRATION RATE: 16 BRPM | HEIGHT: 65 IN | BODY MASS INDEX: 32.15 KG/M2 | WEIGHT: 193 LBS | OXYGEN SATURATION: 98 % | DIASTOLIC BLOOD PRESSURE: 87 MMHG

## 2022-11-16 DIAGNOSIS — Z85.3 PERSONAL HISTORY OF MALIGNANT NEOPLASM OF BREAST: ICD-10-CM

## 2022-11-16 PROCEDURE — 99213 OFFICE O/P EST LOW 20 MIN: CPT

## 2022-11-16 PROCEDURE — T1013: CPT

## 2022-11-16 NOTE — HISTORY OF PRESENT ILLNESS
[FreeTextEntry1] : The patient is a 57 year old female with R breast IDC, moderately differentiated, DCIS intermediate grade, ER/CO positive, HER2 negative s/p R lumpectomy SLNB 5/17/2021, 1.5 cm IDC, grade 2; 7 mm DCIS, low to intermediate grade. Margins negative. 0/3 SLNB. pT1cN0.  Pt presented to the ED 7/11/21 with drainage from the right breast while in the middle of radiation. Managed with serial aspirations and po antibiotics. \par \par Prior imaging:\par 3/19/2021 B/L SM (NW) revealed scattered fibroglandular densities. TC 18.3%\par  - L upper breast ribbon clip\par  - R outer/upper outer breast 2.5 cm mass, N5\par  - R lateral to mass 6 mm cluster of calcs\par \par 3/24/2021 R DM \par  - R upper outer breast 1.4 x 1.6 cm with heterogeneous microcalcifications\par \par 3/24/2021 R US\par  - R 10:00 N5 1.5 x 1 x 0.9 cm irregular hypoechoic mass -> BR5\par  - R ax LN wnl\par \par 3/31/2021 USG-CNB\par  - R 10:00 N5: invasive moderately diff ductal carcinoma, DCIS, intermediate grade, focal. ER >98%, CO >98%, HER2 0\par \par 4/16/2021 MRI:\par  - R 1.9 x 2.6 x 2.0 cm mass with bx clip.\par  - L breast neg\par  - No axillary or internal mammary adenopathy\par  - L thyroid lobe nodule\par \par 5/17/2021 R lumpectomy SLNB revealed 1.5 cm IDC, grade 2; 7 mm DCIS, low to intermediate grade. Margins negative. 0/3 SLNB. pT1cN0.\par \par Postop:\par Pt reports that she only took Oxycodone on the first day home; otherwise, she takes Tylenol sporadically. She notes that pain is not bad unless you touch the area, and she notes having discomfort during bumps in the car. Denies any limitations in arm movement. Reports some numbness inside her arm up to her elbow and some numbness on the breast. Denies fevers. \par \par 6/30/21-7/30/2021 RT\par \par 7/21/2021 Admitted from ED for abscess.\par \par 4/12/2022 B/L DM (NW), heterogeneously dense breasts \par  - No suspicious mass, suspicious microcalcifications, or other sign of malignancy is identified. \par  - Postlumpectomy changes within the right outer breast.\par  - Stable ribbon shaped biopsy marker within the left upper outer breast.\par \par 4/12/2022 B/L US\par  - R 10:00 scar\par  - L neg\par  - BR2\par \par 4/20/2022: Pt is recovering well after radiation and previous infection. She is taking Anastrazole, good compliance. But she is concerned that she is gaining weight and she is having difficulty sleeping. She takes the medication in the mornings. Otherwise, she notes some residual discomfort in the axilla and breast. She notes the breast is still hard. She has sensitivity in the axilla. No lumps. No nipple discharge. No fevers.\par \par 10/7/2022 MRI (NW)\par - R postop and radiation related changes; no suspicious enhancement in surgical bed. \par - L negative\par - BR2, resume annual mmg\par \par Interval History: \par Denies any new breast complaints. Denies breast lumps. Still feels a little hardness in the area but getting softer. Feels a little tenderness in the breast still, and in the axilla, feels "pinching."

## 2022-11-16 NOTE — CONSULT LETTER
[FreeTextEntry3] : Milla Mena MD\par Breast Surgeon\par Division of Surgical Oncology\par Department of Surgery\par [address] [place] [address]\[place] [address] \par Tel: [phone]\par Fax: [phone]\par Email: [email or web address]

## 2022-11-16 NOTE — ASSESSMENT
[FreeTextEntry1] : The patient is a 57 year old female with R breast IDC, moderately differentiated, DCIS intermediate grade, ER/IL positive, HER2 negative s/p R lumpectomy SLNB 5/17/2021, 1.5 cm IDC, grade 2; 7 mm DCIS, low to intermediate grade. Margins negative. 0/3 SLNB. pT1cN0.  Pt presented to the ED 7/11/21 with drainage from the right breast while in the middle of radiation. Managed with serial aspirations and po antibiotics. now s/p adjuvant RT, currently on Anastrazole.\par \par Most recent MRI reviewed today, BR2.\par \par Exam today shows no evidence of new breast masses. Still with some post-RT changes.\par \par Plan:\par  - f/u med onc, continue anastrazole\par  - Next  and US 4/2023\par  - RTO after imaging\par \par

## 2022-11-16 NOTE — REASON FOR VISIT
[[employer] ] : provided by [employer]   [Time Spent: ____ minutes] : Total time spent using  services: [unfilled] minutes. The patient's primary language is not English thus required  services. [Interpreters_IDNumber] : [id number] [Interpreters_FullName] : Eliane [TWNoteComboBox1] : Faroese

## 2022-11-16 NOTE — PHYSICAL EXAM
[Normocephalic] : normocephalic [Atraumatic] : atraumatic [EOMI] : extra ocular movement intact [PERRL] : pupils equal, round and reactive to light [Sclera nonicteric] : sclera nonicteric [Supple] : supple [No Supraclavicular Adenopathy] : no supraclavicular adenopathy [No Cervical Adenopathy] : no cervical adenopathy [Examined in the supine and seated position] : examined in the supine and seated position [Asymmetrical] : asymmetrical [Grade 1] : Ptosis Grade 1 [No dominant masses] : no dominant masses in right breast  [No dominant masses] : no dominant masses left breast [No Nipple Retraction] : no left nipple retraction [No Nipple Discharge] : no left nipple discharge [Breast Mass Right Breast ___cm] : no masses [Breast Mass Left Breast ___cm] : no masses [No Axillary Lymphadenopathy] : no right axillary lymphadenopathy [No Edema] : no edema [No Swelling] : no swelling [Full ROM] : full range of motion [Breast Nipple Inversion] : nipples not inverted [Breast Nipple Retraction] : nipples not retracted [Breast Nipple Flattening] : nipples not flattened [Breast Nipple Fissures] : nipples not fissured [Breast Abnormal Lactation (Galactorrhea)] : no galactorrhea [Breast Abnormal Secretion Bloody Fluid] : no bloody discharge [Breast Abnormal Secretion Serous Fluid] : no serous discharge [Breast Abnormal Secretion Opalescent Fluid] : no milky discharge [de-identified] : non-labored respirations  [de-identified] : axillary incision well-healed. Hyperpigmentation improving. No erythema. No fluctuance. Breast hyperpigmentation resolved, no erythema. Incision well-healed. Slight induration noted [de-identified] : hyperpigmented spots over areola

## 2022-11-28 ENCOUNTER — APPOINTMENT (OUTPATIENT)
Dept: INTERNAL MEDICINE | Facility: CLINIC | Age: 57
End: 2022-11-28

## 2022-11-28 ENCOUNTER — OUTPATIENT (OUTPATIENT)
Dept: OUTPATIENT SERVICES | Facility: HOSPITAL | Age: 57
LOS: 1 days | End: 2022-11-28

## 2022-11-28 VITALS
HEIGHT: 65 IN | WEIGHT: 193 LBS | RESPIRATION RATE: 16 BRPM | HEART RATE: 77 BPM | DIASTOLIC BLOOD PRESSURE: 82 MMHG | SYSTOLIC BLOOD PRESSURE: 138 MMHG | BODY MASS INDEX: 32.15 KG/M2 | OXYGEN SATURATION: 98 %

## 2022-11-28 DIAGNOSIS — Z98.890 OTHER SPECIFIED POSTPROCEDURAL STATES: Chronic | ICD-10-CM

## 2022-11-28 DIAGNOSIS — R21 RASH AND OTHER NONSPECIFIC SKIN ERUPTION: ICD-10-CM

## 2022-11-28 PROCEDURE — 99214 OFFICE O/P EST MOD 30 MIN: CPT | Mod: GC

## 2022-11-28 RX ORDER — METOPROLOL TARTRATE 25 MG/1
25 TABLET, FILM COATED ORAL TWICE DAILY
Qty: 180 | Refills: 1 | Status: DISCONTINUED | COMMUNITY
Start: 2022-04-05 | End: 2022-11-28

## 2022-11-28 RX ORDER — METFORMIN HYDROCHLORIDE 500 MG/1
500 TABLET, COATED ORAL
Qty: 60 | Refills: 0 | Status: DISCONTINUED | COMMUNITY
Start: 2022-02-10 | End: 2022-11-28

## 2022-11-29 DIAGNOSIS — I10 ESSENTIAL (PRIMARY) HYPERTENSION: ICD-10-CM

## 2022-11-29 DIAGNOSIS — R21 RASH AND OTHER NONSPECIFIC SKIN ERUPTION: ICD-10-CM

## 2022-11-29 DIAGNOSIS — E11.9 TYPE 2 DIABETES MELLITUS WITHOUT COMPLICATIONS: ICD-10-CM

## 2022-11-29 NOTE — HISTORY OF PRESENT ILLNESS
[FreeTextEntry1] : Follow up for chronic conditions and Rash\par  [de-identified] : Patient is a 57 year-old female with HTN, DM, GERD, multinodular goiter, hepatic steatosis, DCIS of R. Breast s/p lumpectomy/radiation, and now on anastrazole daily presenting for a follow-up appointment on her chronic conditions.\par \par #Rash\par she states that since last friday, and weeks before that, there were pimples in the lower abdominal area with hyperpigmentation, some of  them disappeared spontaneously, she tried Neosporin to help with pimples. but there is one that persisted with induration and pain, color change. She wants a dermatology appointment.  \par \par #DM\par her FS in the am is 150s, 160s 180s, her vision is getting worse in the nearsight. she also endorsed some numbness in the fingertips. \par  she denied any polyuria, polydipsia, abd pain, n/v.\par \par #HTN\par her bp at home is 130s, 140s, 150s, she takes her home meds consistently, denied headache, dizziness.\par \par #Palpitations\par she states that her palpitations are still the same, still ongoing, 4-5 times a month. she has a hx of multinodular goiter, last TSH wnl, takes metoprolol 50mg BID.

## 2022-11-29 NOTE — PHYSICAL EXAM
[No Acute Distress] : no acute distress [Well Nourished] : well nourished [Well Developed] : well developed [Well-Appearing] : well-appearing [No Respiratory Distress] : no respiratory distress  [No Accessory Muscle Use] : no accessory muscle use [Normal Rate] : normal rate  [Regular Rhythm] : with a regular rhythm [Normal S1, S2] : normal S1 and S2 [No Murmur] : no murmur heard [Soft] : abdomen soft [Non Tender] : non-tender [Non-distended] : non-distended [Normal Bowel Sounds] : normal bowel sounds [No Joint Swelling] : no joint swelling [Grossly Normal Strength/Tone] : grossly normal strength/tone [Normal] : no joint swelling and grossly normal strength and tone [de-identified] : irregular contour in the neck, asymmetric [de-identified] : several healed furuncles in the lower abd skin folds, and inner thigh left side, hyperpigmented, except one indurated 1cm carbuncle with erythematous changes without clear boarder [de-identified] : foot exam is normal with sensory, and proprioception.

## 2022-11-29 NOTE — REVIEW OF SYSTEMS
[Vision Problems] : vision problems [Palpitations] : palpitations [Skin Rash] : skin rash [Fever] : no fever [Chills] : no chills [Chest Pain] : no chest pain [Shortness Of Breath] : no shortness of breath [Wheezing] : no wheezing [Cough] : no cough [Abdominal Pain] : no abdominal pain [Nausea] : no nausea [Constipation] : no constipation [Diarrhea] : diarrhea [Vomiting] : no vomiting [Headache] : no headache [Dizziness] : no dizziness

## 2022-11-29 NOTE — ASSESSMENT
[FreeTextEntry1] : HCM\par got flu shot already this flu season\par \par D/w and seen with Dr. Caballero\par \par George Perez, PGY 1

## 2022-11-29 NOTE — END OF VISIT
[] : Resident [FreeTextEntry3] : Spoke with pt through , taking metformin bid with her meals, so agree that additional meds needed for better DM control. Also agree with titrating up BP meds, not at goal. Rash c/w recurrent furuncles, evidence of old lesions on lower abdomen and upper inner thighs, and current lesion on left upper inner thigh, 2 cm, red, tender to touch, with minimal surrounding induration and no pus or drainage head. Likely staph, must assume MRSA (community type) so would treat this current active infection and then do decontamination to prevent recurrences.

## 2022-11-29 NOTE — INTERPRETER SERVICES
[[employer] ] : provided by [employer]   [Time Spent: ____ minutes] : Total time spent using  services: [unfilled] minutes. The patient's primary language is not English thus required  services. [Interpreters_IDNumber] : [id number] [TWNoteComboBox1] : Niuean

## 2022-12-12 ENCOUNTER — APPOINTMENT (OUTPATIENT)
Dept: ULTRASOUND IMAGING | Facility: IMAGING CENTER | Age: 57
End: 2022-12-12

## 2022-12-12 ENCOUNTER — OUTPATIENT (OUTPATIENT)
Dept: OUTPATIENT SERVICES | Facility: HOSPITAL | Age: 57
LOS: 1 days | End: 2022-12-12
Payer: MEDICAID

## 2022-12-12 ENCOUNTER — NON-APPOINTMENT (OUTPATIENT)
Age: 57
End: 2022-12-12

## 2022-12-12 DIAGNOSIS — Z00.00 ENCOUNTER FOR GENERAL ADULT MEDICAL EXAMINATION WITHOUT ABNORMAL FINDINGS: ICD-10-CM

## 2022-12-12 DIAGNOSIS — Z98.890 OTHER SPECIFIED POSTPROCEDURAL STATES: Chronic | ICD-10-CM

## 2022-12-12 PROCEDURE — 76536 US EXAM OF HEAD AND NECK: CPT

## 2022-12-12 PROCEDURE — 76536 US EXAM OF HEAD AND NECK: CPT | Mod: 26

## 2022-12-14 ENCOUNTER — APPOINTMENT (OUTPATIENT)
Dept: DERMATOLOGY | Facility: CLINIC | Age: 57
End: 2022-12-14

## 2022-12-21 ENCOUNTER — NON-APPOINTMENT (OUTPATIENT)
Age: 57
End: 2022-12-21

## 2023-01-03 ENCOUNTER — OUTPATIENT (OUTPATIENT)
Dept: OUTPATIENT SERVICES | Facility: HOSPITAL | Age: 58
LOS: 1 days | Discharge: HOME | End: 2023-01-03

## 2023-01-03 DIAGNOSIS — C50.919 MALIGNANT NEOPLASM OF UNSPECIFIED SITE OF UNSPECIFIED FEMALE BREAST: ICD-10-CM

## 2023-01-03 DIAGNOSIS — Z98.890 OTHER SPECIFIED POSTPROCEDURAL STATES: Chronic | ICD-10-CM

## 2023-01-04 ENCOUNTER — APPOINTMENT (OUTPATIENT)
Dept: INTERNAL MEDICINE | Facility: CLINIC | Age: 58
End: 2023-01-04

## 2023-01-09 ENCOUNTER — APPOINTMENT (OUTPATIENT)
Dept: HEMATOLOGY ONCOLOGY | Facility: CLINIC | Age: 58
End: 2023-01-09
Payer: MEDICAID

## 2023-01-09 VITALS
RESPIRATION RATE: 18 BRPM | WEIGHT: 191.8 LBS | BODY MASS INDEX: 31.92 KG/M2 | TEMPERATURE: 96.8 F | OXYGEN SATURATION: 99 % | DIASTOLIC BLOOD PRESSURE: 82 MMHG | HEART RATE: 99 BPM | SYSTOLIC BLOOD PRESSURE: 164 MMHG

## 2023-01-09 DIAGNOSIS — T45.1X5A PAIN IN UNSPECIFIED JOINT: ICD-10-CM

## 2023-01-09 DIAGNOSIS — M25.50 PAIN IN UNSPECIFIED JOINT: ICD-10-CM

## 2023-01-09 DIAGNOSIS — E55.9 VITAMIN D DEFICIENCY, UNSPECIFIED: ICD-10-CM

## 2023-01-09 PROCEDURE — 99214 OFFICE O/P EST MOD 30 MIN: CPT

## 2023-01-09 NOTE — PHYSICAL EXAM
[Fully active, able to carry on all pre-disease performance without restriction] : Status 0 - Fully active, able to carry on all pre-disease performance without restriction [Normal] : affect appropriate [de-identified] : Post RT skin changes noted, right lumpectomy scar healing well

## 2023-01-09 NOTE — HISTORY OF PRESENT ILLNESS
[de-identified] : Ms. SHANTAL HANSON is a 56 year old female here for an evaluation of breast cancer. Her oncologic history is as follows:\par \par Screening mammo 3/19/2021: R outer/upper outer breast 2.5 cm mass and R lateral to mass 6 mm cluster of calcs. Diagnostic right mammo 3/24/21 showed R upper outer breast mass 1.4 x 1.6 cm with heterogeneous microcalcifications. Sono:mass at 10:00: 1.5 x 1 x 0.9 cm irregular hypoechoic mass. \par \par 3/31/2021: Right breast biopsy 10:00: invasive moderately diff ductal carcinoma, DCIS, intermediate grade, focal. ER >98%, NV >98%, HER2 negative\par \par 4/16/2021 MRI: concordant with known malignancy right breast. No suspicious findings in left breast\par \par 5/17/2021: R lumpectomy and SLNB revealed 1.5 cm IDC, grade 2; 7 mm DCIS, low to intermediate grade. Margins negative(Fragmented nature of the final margin specimens preclude assessment of the distance). 0/3 SLNB. ER : Positive, > 98%, PgR : Positive, > 98%, HER2: Negative. pT1cN0, under care of Dr Mena.\par \par  Oncotype DX 9\par \par LMP 10 years ago [de-identified] : Ms. SHANTAL HANSON  is here for a follow up appt for stage IA (T1c, N0, M0) ER positive, MT positive, HER-2/katarzyna negative invasive ductal carcinoma of the right breast. ODX 9. Completed RT 9/7/21. Started anastrozole 9/22/21 \par \par Takes Anastrozole daily, good compliance. She reports hot flashes,  denies aches/pain, vag dryness, excessive fatigue, GI s/e, hair loss. She is active, no change in energy, wt or appetite. \par mammogram - 4/2022 birads 2, script in for 4/2023\par DEXA-  06/23/2021 DEXA Normal.  She takes ca+vit \par  [Date: ____________] : Patient's last distress assessment performed on [unfilled]. [7 - Distress Level] : Distress Level: 7 [Patient given social work contact information and resource sheet] : Patient was given social work contact information and resource sheet

## 2023-01-09 NOTE — REASON FOR VISIT
[Follow-Up Visit] : a follow-up [[employer] ] : provided by [employer]   [FreeTextEntry2] : breast ca, odx 9 [FreeTextEntry3] : sarah grimes, [id number], Greenlandic

## 2023-01-09 NOTE — CONSULT LETTER
[Please see my note below.] : Please see my note below. [Consult Closing:] : Thank you very much for allowing me to participate in the care of this patient.  If you have any questions, please do not hesitate to contact me. [Sincerely,] : Sincerely, [FreeTextEntry3] : Camila Max MD\par Division of Medical Oncology and Hematology\par [facility]\par [employer] [facility]\par [place], [place]\par \par

## 2023-01-09 NOTE — ASSESSMENT
[FreeTextEntry1] : In summary, Ms. SHANTAL HANSON is a 56 year old postmenopausal female with stage IA (T1c, N0, M0) ER positive, RI positive, HER-2/katarzyna negative invasive ductal carcinoma of the right breast. She is status post lumpectomy, met with Dr Avila completed RT on 9/7/21. ODX 9 . She started anastrozole 9/22/21\par \par 1. Breast ca- Ms. SHANTAL HANSON  is tolerating AI well, good compliance. She has mild side effects from the treatment. Continue treatment with Arimidex 1 mg daily for five-7  years.  Annual breast imaging by Dr. Milla Mena\par 2. Concern for worsening bone density and fractures due to anastrozole. Rec to  continue calcium and vit D. DEXA 1-2 yrs Baseline DEXA  06/23/2021 DEXA Normal.\par 3. Low Vitamin D: concern for worsening bone loss due to anastrozole and low vit D. Discussed to increase Vit D intake. Will check Vit D level today\par 4. Concern for worsening cholesterol/CAD risk factors due to anastrozole. Lipid profile annually. Life style modifications d/w her.\par \par 	\par RTC 6 m

## 2023-02-06 ENCOUNTER — RX RENEWAL (OUTPATIENT)
Age: 58
End: 2023-02-06

## 2023-02-09 ENCOUNTER — APPOINTMENT (OUTPATIENT)
Dept: OPHTHALMOLOGY | Facility: CLINIC | Age: 58
End: 2023-02-09

## 2023-02-17 ENCOUNTER — NON-APPOINTMENT (OUTPATIENT)
Age: 58
End: 2023-02-17

## 2023-03-13 ENCOUNTER — OUTPATIENT (OUTPATIENT)
Dept: OUTPATIENT SERVICES | Facility: HOSPITAL | Age: 58
LOS: 1 days | End: 2023-03-13

## 2023-03-13 ENCOUNTER — APPOINTMENT (OUTPATIENT)
Dept: INTERNAL MEDICINE | Facility: CLINIC | Age: 58
End: 2023-03-13
Payer: MEDICAID

## 2023-03-13 ENCOUNTER — TRANSCRIPTION ENCOUNTER (OUTPATIENT)
Age: 58
End: 2023-03-13

## 2023-03-13 VITALS
BODY MASS INDEX: 31.82 KG/M2 | TEMPERATURE: 97.3 F | RESPIRATION RATE: 16 BRPM | OXYGEN SATURATION: 97 % | HEIGHT: 65 IN | HEART RATE: 80 BPM | WEIGHT: 191 LBS | SYSTOLIC BLOOD PRESSURE: 126 MMHG | DIASTOLIC BLOOD PRESSURE: 68 MMHG

## 2023-03-13 DIAGNOSIS — Z98.890 OTHER SPECIFIED POSTPROCEDURAL STATES: Chronic | ICD-10-CM

## 2023-03-13 DIAGNOSIS — R21 RASH AND OTHER NONSPECIFIC SKIN ERUPTION: ICD-10-CM

## 2023-03-13 PROCEDURE — 99214 OFFICE O/P EST MOD 30 MIN: CPT | Mod: GC

## 2023-03-13 RX ORDER — BLOOD-GLUCOSE METER
W/DEVICE KIT MISCELLANEOUS
Qty: 1 | Refills: 0 | Status: ACTIVE | COMMUNITY
Start: 2023-03-13 | End: 1900-01-01

## 2023-03-13 RX ORDER — LANCETS
EACH MISCELLANEOUS
Qty: 30 | Refills: 0 | Status: ACTIVE | COMMUNITY
Start: 2023-03-13 | End: 1900-01-01

## 2023-03-13 NOTE — PHYSICAL EXAM
[No Acute Distress] : no acute distress [Well Nourished] : well nourished [Well Developed] : well developed [No Respiratory Distress] : no respiratory distress  [Normal Rate] : normal rate  [Regular Rhythm] : with a regular rhythm [Normal S1, S2] : normal S1 and S2 [No Murmur] : no murmur heard [No Edema] : there was no peripheral edema [Soft] : abdomen soft [Non Tender] : non-tender [Non-distended] : non-distended [Normal Bowel Sounds] : normal bowel sounds [No Rash] : no rash [Coordination Grossly Intact] : coordination grossly intact [No Focal Deficits] : no focal deficits

## 2023-03-13 NOTE — H&P ADULT - NSCORESITESY/N_GEN_A_CORE_RD
Patient : Angel Luis Calles Age: 24 month old Sex: male   MRN: [id number] Encounter Date: 3/13/2023    History     Chief Complaint   Patient presents with   • Arm Pain       HPI    Angel Luis Calles is a 24 month old male presenting to the emergency department complaining of left arm pain after an injury at a birthday party yesterday.  Mother did not witness the injury but the patient's uncle reports he fell awkwardly onto the left arm.  Since that time he has not been using the left arm.  He has otherwise been acting normal and has had no nausea or vomiting.  There was no head injury witnessed. There are no additional complaints or modifying factors.        No Known Allergies    Discharge Medication List as of 3/13/2023  1:00 PM      Prior to Admission Medications    Details   albuterol 108 (90 Base) MCG/ACT inhaler Inhale 2 puffs into the lungs every 4 hours as needed for Shortness of Breath or Wheezing.Eprescribe, Disp-1 each, R-0      ondansetron (Zofran ODT) 4 MG disintegrating tablet Place 0.5 tablets onto the tongue every 8 hours as needed for Nausea.Eprescribe, Disp-6 tablet, R-0             Social History     Tobacco Use   • Smoking status: Never   • Smokeless tobacco: Never   Substance Use Topics   • Alcohol use: Never   • Drug use: Never       Review of Systems     Review of Systems   Constitutional: Negative for activity change and crying.   HENT: Negative for congestion, facial swelling and rhinorrhea.    Eyes: Negative for redness and itching.   Respiratory: Negative for cough and wheezing.    Gastrointestinal: Negative for abdominal distention, diarrhea and vomiting.   Genitourinary: Negative for decreased urine volume and difficulty urinating.   Musculoskeletal: Positive for arthralgias. Negative for joint swelling and myalgias.   Skin: Negative for pallor and rash.   Neurological: Negative for syncope and headaches.   Psychiatric/Behavioral: Negative for agitation and behavioral problems.        Physical Exam     ED Triage Vitals [03/13/23 0943]   ED Triage Vitals Group      Temp 97.5 °F (36.4 °C)      Heart Rate 134      Resp 28      BP       SpO2 99 %      EtCO2 mmHg       Height       Weight 31 lb 3.2 oz (14.2 kg)      Weight Scale Used Standing scale      BMI (Calculated)       IBW/kg (Calculated)        Physical Exam  Vitals and nursing note reviewed.   Constitutional:       General: He is playful. He is not in acute distress.     Appearance: He is well-developed. He is not ill-appearing or toxic-appearing.   HENT:      Head: Normocephalic. No abnormal fontanelles.      Right Ear: External ear normal.      Left Ear: External ear normal.      Nose: Rhinorrhea present.      Mouth/Throat:      Mouth: Mucous membranes are moist.      Pharynx: Oropharynx is clear.   Eyes:      General: Lids are normal.      Conjunctiva/sclera: Conjunctivae normal.   Pulmonary:      Effort: Pulmonary effort is normal. No accessory muscle usage, respiratory distress or nasal flaring.   Musculoskeletal:         General: No deformity. Normal range of motion.      Cervical back: Normal range of motion and neck supple.      Comments: Patient appears comfortable and is watching mom cell phone, however, he is not using his left arm.  There is normal perfusion in the hands and wrist of the left arm.  There is no gross deformity.   Skin:     Coloration: Skin is not jaundiced or pale.      Findings: No petechiae or rash.   Neurological:      Mental Status: He is alert and oriented for age.      GCS: GCS eye subscore is 4. GCS verbal subscore is 5. GCS motor subscore is 6.      Cranial Nerves: No cranial nerve deficit.      Coordination: Coordination normal.   Psychiatric:         Behavior: Behavior is cooperative.         Procedures     Procedures    Lab Results     No results found for this visit on 03/13/23.    EKG                 Radiology Results     Imaging Results          XR UPPER EXTREMITY GREATER THAN 12 MONTHS MIN 2  VIEWS LT (Final result)  Result time 03/13/23 10:57:41    Final result                 Impression:    IMPRESSION:   1. No fracture seen.               Narrative:    EXAM: XR UPPER EXTREMITY GREATER THAN 12 MONTHS 2 VIEWS LEFT    HISTORY: Unwitnessed fall. Left humerus pain.    COMPARISONS:  None.    TECHNIQUE: 2 views of the left upper extremity    FINDINGS:      No fracture is identified. Osseous alignment is anatomic. No focal soft  tissue abnormality.                                Medications   ibuprofen (CHILDRENS ADVIL) 100 MG/5ML suspension 142 mg (142 mg Oral Given 3/13/23 1205)       ED Course          Radiology Review: I have independently interpreted the Xray of the Left arm and have found No acute or active disease.  I am awaiting on the final radiology read.         I attempted a nursemaid's reduction using both the supination and hyperpronation methods.  I did not feel the telltale click of a successful reduction.  The I spoke with Dr. Tucker, who will also attempt.    Dr. Tucker does not believe she was successful with a reduction. Plan to give dose of ibuprofen and observe for improvement.     12:53 PM  Pt fell asleep shortly after being given the ibuprofen. Discussed follow up at the [facility].  Location a number of this facility was given to mom.  Mom plans to observe patient for improvement this afternoon prior to decision for follow-up. Return precautions were discussed. All questions were answered and pt verbalized understanding and comfort with plan of care.     MDM      Patient is a 24 month old with complaint of left arm pain.  My differential diagnosis includes but is not limited to nursemaid's elbow versus fracture or sprain.  X-ray sees no fracture.  I suspect nursemaid's elbow but have been unsuccessful at multiple reduction attempts.  Dr. Tucker was also unsuccessful in her attempts.  The patient will not require admission.  I encouraged mom to follow up  with [facility] who accepts walk-ins.  Mom would like to wait and see if the patient improves after manipulations were performed here.  I warned against waiting more than 1 additional day as he could decrease the chance of simple manipulation reducing the suspected subluxation.    Disposition       Clinical Impression and Diagnosis  6:27 PM       ED Diagnosis     Diagnosis Comment Associated Orders       Final diagnosis    Left arm pain -- --          The patient was provided with a recommendation to follow up with a primary care provider and obtain reassessment of his/her blood pressure within three months.    Follow Up:  [facility]  [address] [facility] [place] [address] [phone]    If symptoms worsen, or visit ortho clinic we discussed (info attached)          Summary of your Discharge Medications      You have not been prescribed any medications.         Pt is discharged to home/self care in stable condition.        Discharge 3/13/2023 12:59 PM  Angel Luis Calles discharge to home/self care.             Devin FARLEY PA-C  03/13/23 3074     No

## 2023-03-14 DIAGNOSIS — R21 RASH AND OTHER NONSPECIFIC SKIN ERUPTION: ICD-10-CM

## 2023-03-14 DIAGNOSIS — E11.9 TYPE 2 DIABETES MELLITUS WITHOUT COMPLICATIONS: ICD-10-CM

## 2023-03-14 DIAGNOSIS — E04.2 NONTOXIC MULTINODULAR GOITER: ICD-10-CM

## 2023-03-14 DIAGNOSIS — I10 ESSENTIAL (PRIMARY) HYPERTENSION: ICD-10-CM

## 2023-03-14 LAB
ALBUMIN SERPL ELPH-MCNC: 4.8 G/DL
ALP BLD-CCNC: 75 U/L
ALT SERPL-CCNC: 34 U/L
ANION GAP SERPL CALC-SCNC: 12 MMOL/L
AST SERPL-CCNC: 26 U/L
BILIRUB SERPL-MCNC: 0.4 MG/DL
BUN SERPL-MCNC: 9 MG/DL
CALCIUM SERPL-MCNC: 10.6 MG/DL
CHLORIDE SERPL-SCNC: 101 MMOL/L
CHOLEST SERPL-MCNC: 168 MG/DL
CO2 SERPL-SCNC: 26 MMOL/L
CREAT SERPL-MCNC: 0.61 MG/DL
CREAT SPEC-SCNC: 21 MG/DL
EGFR: 105 ML/MIN/1.73M2
ESTIMATED AVERAGE GLUCOSE: 163 MG/DL
GLUCOSE SERPL-MCNC: 120 MG/DL
HBA1C MFR BLD HPLC: 7.3
HBA1C MFR BLD HPLC: 7.3 %
HDLC SERPL-MCNC: 53 MG/DL
LDLC SERPL CALC-MCNC: 87 MG/DL
MICROALBUMIN 24H UR DL<=1MG/L-MCNC: 2.3 MG/DL
MICROALBUMIN/CREAT 24H UR-RTO: 113 MG/G
NONHDLC SERPL-MCNC: 115 MG/DL
POTASSIUM SERPL-SCNC: 3.9 MMOL/L
PROT SERPL-MCNC: 7.8 G/DL
SODIUM SERPL-SCNC: 139 MMOL/L
TRIGL SERPL-MCNC: 141 MG/DL
TSH SERPL-ACNC: 0.96 UIU/ML

## 2023-03-14 NOTE — HISTORY OF PRESENT ILLNESS
[FreeTextEntry1] : f/u medical issues [de-identified] : Patient is a 57 year-old female with HTN, DM, GERD, multinodular goiter, hepatic steatosis, DCIS of R. Breast s/p lumpectomy/radiation, and now on anastrazole daily presenting for a follow-up appointment on her chronic conditions.\par \par #Rash\par She states that the rash improved from Bactrim treatment, the induration is gone, and skin a little discolored from the scarring. she still wants to see her dermtologist in april. \par \par #DM\par She couldn't get her finger stick since she was running out her supplies, she still endorsed some numbness in the fingertips. She denied any polyuria, polydipsia, abd pain, n/v. she will see her opthalmology in March. \par \par #HTN\par her bp at home is less than 130s, she takes her home meds consistently, she can do her normal daily activities without any sob. denied headache, dizziness.\par \par #Palpitations\par she states that her palpitations are still the same, still ongoing. her neck is throbbing recently. she has a hx of multinodular goiter, last TSH wnl, takes metoprolol 50mg BID. explained the last US to her. reassured her.  \par

## 2023-03-14 NOTE — REVIEW OF SYSTEMS
[Fever] : no fever [Chills] : no chills [Vision Problems] : no vision problems [Chest Pain] : no chest pain [Palpitations] : no palpitations [Shortness Of Breath] : no shortness of breath [Wheezing] : no wheezing [Cough] : no cough [Dyspnea on Exertion] : no dyspnea on exertion [Abdominal Pain] : no abdominal pain [Nausea] : no nausea [Diarrhea] : diarrhea [Vomiting] : no vomiting [Dysuria] : no dysuria [Frequency] : no frequency [Headache] : no headache [Dizziness] : no dizziness

## 2023-03-15 DIAGNOSIS — Z00.00 ENCOUNTER FOR GENERAL ADULT MEDICAL EXAMINATION WITHOUT ABNORMAL FINDINGS: ICD-10-CM

## 2023-03-24 ENCOUNTER — APPOINTMENT (OUTPATIENT)
Dept: MAMMOGRAPHY | Facility: IMAGING CENTER | Age: 58
End: 2023-03-24
Payer: MEDICAID

## 2023-03-24 ENCOUNTER — APPOINTMENT (OUTPATIENT)
Dept: ULTRASOUND IMAGING | Facility: IMAGING CENTER | Age: 58
End: 2023-03-24

## 2023-03-24 ENCOUNTER — RESULT REVIEW (OUTPATIENT)
Age: 58
End: 2023-03-24

## 2023-03-24 ENCOUNTER — OUTPATIENT (OUTPATIENT)
Dept: OUTPATIENT SERVICES | Facility: HOSPITAL | Age: 58
LOS: 1 days | End: 2023-03-24
Payer: MEDICAID

## 2023-03-24 DIAGNOSIS — N63.10 UNSPECIFIED LUMP IN THE RIGHT BREAST, UNSPECIFIED QUADRANT: ICD-10-CM

## 2023-03-24 DIAGNOSIS — Z00.8 ENCOUNTER FOR OTHER GENERAL EXAMINATION: ICD-10-CM

## 2023-03-24 PROCEDURE — 77066 DX MAMMO INCL CAD BI: CPT

## 2023-03-24 PROCEDURE — 77066 DX MAMMO INCL CAD BI: CPT | Mod: 26

## 2023-03-24 PROCEDURE — 76641 ULTRASOUND BREAST COMPLETE: CPT

## 2023-03-24 PROCEDURE — 77062 BREAST TOMOSYNTHESIS BI: CPT | Mod: 26

## 2023-03-24 PROCEDURE — 76641 ULTRASOUND BREAST COMPLETE: CPT | Mod: 26,50

## 2023-03-24 PROCEDURE — G0279: CPT

## 2023-03-30 ENCOUNTER — APPOINTMENT (OUTPATIENT)
Dept: ULTRASOUND IMAGING | Facility: IMAGING CENTER | Age: 58
End: 2023-03-30
Payer: MEDICAID

## 2023-03-30 ENCOUNTER — RESULT REVIEW (OUTPATIENT)
Age: 58
End: 2023-03-30

## 2023-03-30 ENCOUNTER — OUTPATIENT (OUTPATIENT)
Dept: OUTPATIENT SERVICES | Facility: HOSPITAL | Age: 58
LOS: 1 days | End: 2023-03-30
Payer: MEDICAID

## 2023-03-30 DIAGNOSIS — N63.10 UNSPECIFIED LUMP IN THE RIGHT BREAST, UNSPECIFIED QUADRANT: ICD-10-CM

## 2023-03-30 DIAGNOSIS — Z98.890 OTHER SPECIFIED POSTPROCEDURAL STATES: Chronic | ICD-10-CM

## 2023-03-30 DIAGNOSIS — Z00.8 ENCOUNTER FOR OTHER GENERAL EXAMINATION: ICD-10-CM

## 2023-03-30 PROCEDURE — 77065 DX MAMMO INCL CAD UNI: CPT | Mod: 26,RT

## 2023-03-30 PROCEDURE — 88305 TISSUE EXAM BY PATHOLOGIST: CPT

## 2023-03-30 PROCEDURE — 19083 BX BREAST 1ST LESION US IMAG: CPT | Mod: RT

## 2023-03-30 PROCEDURE — A4648: CPT

## 2023-03-30 PROCEDURE — 77065 DX MAMMO INCL CAD UNI: CPT

## 2023-03-30 PROCEDURE — 88305 TISSUE EXAM BY PATHOLOGIST: CPT | Mod: 26

## 2023-03-30 PROCEDURE — 19083 BX BREAST 1ST LESION US IMAG: CPT

## 2023-04-07 ENCOUNTER — RX RENEWAL (OUTPATIENT)
Age: 58
End: 2023-04-07

## 2023-04-07 LAB — SURGICAL PATHOLOGY STUDY: SIGNIFICANT CHANGE UP

## 2023-04-10 RX ORDER — BLOOD-GLUCOSE METER
KIT MISCELLANEOUS
Qty: 30 | Refills: 0 | Status: ACTIVE | COMMUNITY
Start: 2023-03-13 | End: 1900-01-01

## 2023-04-10 RX ORDER — BLOOD SUGAR DIAGNOSTIC
STRIP MISCELLANEOUS
Qty: 50 | Refills: 0 | Status: ACTIVE | COMMUNITY
Start: 2023-04-10 | End: 1900-01-01

## 2023-04-11 ENCOUNTER — RX RENEWAL (OUTPATIENT)
Age: 58
End: 2023-04-11

## 2023-04-11 RX ORDER — LANCETS 30 GAUGE
EACH MISCELLANEOUS
Qty: 1 | Refills: 3 | Status: ACTIVE | COMMUNITY
Start: 2023-04-11 | End: 1900-01-01

## 2023-05-10 ENCOUNTER — APPOINTMENT (OUTPATIENT)
Dept: DERMATOLOGY | Facility: CLINIC | Age: 58
End: 2023-05-10

## 2023-05-17 ENCOUNTER — APPOINTMENT (OUTPATIENT)
Dept: SURGICAL ONCOLOGY | Facility: CLINIC | Age: 58
End: 2023-05-17
Payer: MEDICAID

## 2023-05-17 VITALS
RESPIRATION RATE: 17 BRPM | DIASTOLIC BLOOD PRESSURE: 82 MMHG | WEIGHT: 188 LBS | SYSTOLIC BLOOD PRESSURE: 146 MMHG | OXYGEN SATURATION: 99 % | BODY MASS INDEX: 31.32 KG/M2 | HEART RATE: 98 BPM | HEIGHT: 65 IN

## 2023-05-17 PROCEDURE — 99213 OFFICE O/P EST LOW 20 MIN: CPT

## 2023-05-17 PROCEDURE — T1013: CPT

## 2023-05-17 NOTE — REASON FOR VISIT
[Follow-Up: _____] : a [unfilled] follow-up visit [[employer] ] : provided by [employer]   [Time Spent: ____ minutes] : Total time spent using  services: [unfilled] minutes. The patient's primary language is not English thus required  services. [Interpreters_IDNumber] : [id number] [Interpreters_FullName] : Roger [TWNoteComboBox1] : [place]ian

## 2023-05-17 NOTE — ASSESSMENT
[FreeTextEntry1] : The patient is a 58 year old female with R breast IDC, moderately differentiated, DCIS intermediate grade, ER/AR positive, HER2 negative s/p R lumpectomy SLNB 5/17/2021, 1.5 cm IDC, grade 2; 7 mm DCIS, low to intermediate grade. Margins negative. 0/3 SLNB. pT1cN0.  Pt presented to the ED 7/11/21 with drainage from the right breast while in the middle of radiation. Managed with serial aspirations and po antibiotics. now s/p adjuvant RT, currently on Anastrazole.\par \par 11/16/2022: Exam  shows no evidence of new breast masses. Still with some post-RT changes.\par \par Recent imaging revealed 3 adjacent new hypoechoic masses in right breast. Largest one was biopsied revealing benign tissue, concordant.\par \par Exam today shows well-healed incisions, post RT changes.\par \par Plan:\par  - f/u med onc, continue anastrazole\par  - Breast MRI 10/2023\par  - Next SM and US 3/2024\par  - RTO 6 months\par \par

## 2023-05-17 NOTE — HISTORY OF PRESENT ILLNESS
[FreeTextEntry1] : The patient is a 58 year old female with R breast IDC, moderately differentiated, DCIS intermediate grade, ER/AZ positive, HER2 negative s/p R lumpectomy SLNB 5/17/2021, 1.5 cm IDC, grade 2; 7 mm DCIS, low to intermediate grade. Margins negative. 0/3 SLNB. pT1cN0.  Pt presented to the ED 7/11/21 with drainage from the right breast while in the middle of radiation. Managed with serial aspirations and po antibiotics. Here for a follow up visit.\par \par [employer]  Roger [id number] used for Georgian.\par \par Prior imaging:\par 3/19/2021 B/L SM (NW) revealed scattered fibroglandular densities. TC 18.3%\par  - L upper breast ribbon clip\par  - R outer/upper outer breast 2.5 cm mass, N5\par  - R lateral to mass 6 mm cluster of calcs\par \par 3/24/2021 R DM \par  - R upper outer breast 1.4 x 1.6 cm with heterogeneous microcalcifications\par \par 3/24/2021 R US\par  - R 10:00 N5 1.5 x 1 x 0.9 cm irregular hypoechoic mass -> BR5\par  - R ax LN wnl\par \par 3/31/2021 USG-CNB\par  - R 10:00 N5: invasive moderately diff ductal carcinoma, DCIS, intermediate grade, focal. ER >98%, AZ >98%, HER2 0\par \par 4/16/2021 MRI:\par  - R 1.9 x 2.6 x 2.0 cm mass with bx clip.\par  - L breast neg\par  - No axillary or internal mammary adenopathy\par  - L thyroid lobe nodule\par \par 5/17/2021 R lumpectomy SLNB revealed 1.5 cm IDC, grade 2; 7 mm DCIS, low to intermediate grade. Margins negative. 0/3 SLNB. pT1cN0.\par \par Postop:\par Pt reports that she only took Oxycodone on the first day home; otherwise, she takes Tylenol sporadically. She notes that pain is not bad unless you touch the area, and she notes having discomfort during bumps in the car. Denies any limitations in arm movement. Reports some numbness inside her arm up to her elbow and some numbness on the breast. Denies fevers. \par \par 6/30/21-7/30/2021 RT\par \par 7/21/2021 Admitted from ED for abscess.\par \par 4/12/2022 B/L DM (NW), heterogeneously dense breasts \par  - No suspicious mass, suspicious microcalcifications, or other sign of malignancy is identified. \par  - Postlumpectomy changes within the right outer breast.\par  - Stable ribbon shaped biopsy marker within the left upper outer breast.\par \par 4/12/2022 B/L US\par  - R 10:00 scar\par  - L neg\par  - BR2\par \par 4/20/2022: Pt is recovering well after radiation and previous infection. She is taking Anastrazole, good compliance. But she is concerned that she is gaining weight and she is having difficulty sleeping. She takes the medication in the mornings. Otherwise, she notes some residual discomfort in the axilla and breast. She notes the breast is still hard. She has sensitivity in the axilla. No lumps. No nipple discharge. No fevers.\par \par 10/7/2022 MRI (NW)\par - R postop and radiation related changes; no suspicious enhancement in surgical bed. \par - L negative\par - BR2, resume annual mmg\par \par 11/16/2022: \par Denies any new breast complaints. Denies breast lumps. Still feels a little hardness in the area but getting softer. Feels a little tenderness in the breast still, and in the axilla, feels "pinching."\par \par 3/24/2023 B/L DM (NW) revealed heterogeneously dense breasts \par - R UOQ stable postsurgical changes \par - L UOQ bx marker \par \par 3/24/2023 B/L US\par - R 10:00 postsurgical scar\par - R 9:00 N5: 2 x 2 x 3 mm, 4 x 2 x 5 mm, 8 x 3 x 5 mm adjacent hypoechoic newly noted masses -> f/u R USG-CNB of largest mass\par - L neg\par - BR4A\par \par 3/30/2023 R USG-CNB (NW)\par - R 9:00 (ribbon): benign tissue w/ focal fibrosis, concordant \par \par Interval History: \par Still feels numbness under arm, but is decreasing. Still on anastrazole, no complaints. Breast feels softer.  No fevers/chills.

## 2023-05-17 NOTE — PHYSICAL EXAM
[Normocephalic] : normocephalic [Atraumatic] : atraumatic [EOMI] : extra ocular movement intact [PERRL] : pupils equal, round and reactive to light [Sclera nonicteric] : sclera nonicteric [Supple] : supple [No Supraclavicular Adenopathy] : no supraclavicular adenopathy [No Cervical Adenopathy] : no cervical adenopathy [Examined in the supine and seated position] : examined in the supine and seated position [Asymmetrical] : asymmetrical [Grade 1] : Ptosis Grade 1 [No dominant masses] : no dominant masses in right breast  [No dominant masses] : no dominant masses left breast [No Nipple Retraction] : no left nipple retraction [No Nipple Discharge] : no right nipple discharge [Breast Mass Right Breast ___cm] : no masses [Breast Mass Left Breast ___cm] : no masses [No Axillary Lymphadenopathy] : no right axillary lymphadenopathy [No Edema] : no edema [No Swelling] : no swelling [Full ROM] : full range of motion [Breast Nipple Inversion] : nipples not inverted [Breast Nipple Retraction] : nipples not retracted [Breast Nipple Flattening] : nipples not flattened [Breast Nipple Fissures] : nipples not fissured [Breast Abnormal Lactation (Galactorrhea)] : no galactorrhea [Breast Abnormal Secretion Bloody Fluid] : no bloody discharge [Breast Abnormal Secretion Serous Fluid] : no serous discharge [Breast Abnormal Secretion Opalescent Fluid] : no milky discharge [de-identified] : non-labored respirations  [de-identified] : axillary incision well-healed. Hyperpigmentation improving. No erythema. No fluctuance. Breast hyperpigmentation resolved, no erythema. Incision well-healed. Slight induration noted [de-identified] : hyperpigmented spots over areola

## 2023-05-17 NOTE — CONSULT LETTER
[Dear  ___] : Dear  [unfilled], [Courtesy Letter:] : I had the pleasure of seeing your patient, [unfilled], in my office today. [Please see my note below.] : Please see my note below. [Consult Closing:] : Thank you very much for allowing me to participate in the care of this patient.  If you have any questions, please do not hesitate to contact me. [Sincerely,] : Sincerely, [FreeTextEntry3] : Milla Mena MD\par Breast Surgeon\par Division of Surgical Oncology\par Department of Surgery\par [address] [place] [address]\[place] [address] \par Tel: [phone]\par Fax: [phone]\par Email: [email or web address]

## 2023-06-05 ENCOUNTER — NON-APPOINTMENT (OUTPATIENT)
Age: 58
End: 2023-06-05

## 2023-06-20 RX ORDER — OMEPRAZOLE 40 MG/1
40 CAPSULE, DELAYED RELEASE ORAL
Qty: 30 | Refills: 3 | Status: ACTIVE | COMMUNITY
Start: 2020-07-28 | End: 1900-01-01

## 2023-06-21 ENCOUNTER — APPOINTMENT (OUTPATIENT)
Dept: RADIATION ONCOLOGY | Facility: CLINIC | Age: 58
End: 2023-06-21
Payer: MEDICAID

## 2023-06-21 VITALS
BODY MASS INDEX: 31.7 KG/M2 | SYSTOLIC BLOOD PRESSURE: 150 MMHG | TEMPERATURE: 97.88 F | OXYGEN SATURATION: 98 % | HEIGHT: 65 IN | DIASTOLIC BLOOD PRESSURE: 84 MMHG | HEART RATE: 82 BPM | WEIGHT: 190.26 LBS

## 2023-06-21 PROCEDURE — 99024 POSTOP FOLLOW-UP VISIT: CPT

## 2023-06-21 NOTE — VITALS
[NoTreatment Scheduled] : no treatment scheduled [Maximal Pain Intensity: 0/10] : 0/10 [Least Pain Intensity: 0/10] : 0/10 [ECOG Performance Status: 0 - Fully active, able to carry on all pre-disease performance without restriction] : Performance Status: 0 - Fully active, able to carry on all pre-disease performance without restriction

## 2023-06-21 NOTE — PHYSICAL EXAM
[Sclera] : the sclera and conjunctiva were normal [Outer Ear] : the ears and nose were normal in appearance [Normal] : normal heart rate and rhythm, normal S1 and S2, and no murmurs present [Breast Palpation Mass] : no palpable masses [No UE Edema] : there is no upper extremity edema [de-identified] : very mild radiation changes right breast

## 2023-06-21 NOTE — REASON FOR VISIT
[Routine Follow-Up] : routine follow-up visit for [Breast Cancer] : breast cancer [[employer] ] : provided by [employer]   [Interpreters_IDNumber] : [id number] [TWNoteComboBox1] : Armenian

## 2023-06-21 NOTE — HISTORY OF PRESENT ILLNESS
[FreeTextEntry1] : Ms. Sepulveda is a 57 yo Swedish speaking female who is being seen for follow up visit. She is unaccompanied for today's visit.  \par \par Diagnosis:  RIGHT upper outer breast intraductal carcinoma  pT1c(1.5 cm) N0 (0/3), grade 2, with associated DCIS(7mm) intermediate grade, ER+IA+ HER2 negative,  Oncotype DX score 9\par \par Oncologic Management:\par 3/31/2021 -  Right breast biopsy 10:00: invasive moderately diff ductal carcinoma, DCIS, intermediate grade, focal. ER >98%, IA >98%, HER2 negative\par \par 5/17/2021 -  RIGHT lumpectomy and SLNB revealed 1.5 cm IDC, grade 2; 7 mm DCIS, low to intermediate grade. Margins negative(Fragmented nature of the final margin specimens preclude assessment of the distance). 0/3 SLNB. ER : Positive, > 98%, PgR : Positive, > 98%, HER2: Negative. pT1cN0, under care of Dr Mena.\par Oncotype DX score 9\par \par 6/30/21 -7/30/21:  received RADIATION Therapy to RIGHT Breast 4240 cGy in 16 fx, with Boost 1000 cGy in 4 fx \par Clinical course:  Tolerated the treatment well despite breast abscess during treatment\par \par 9/2021 -  started on Anastrozole (Dr. JULIANNE Max - oncology)\par \par 4/12/22 - Mammogram/US:  No mammographic or sonographic evidence of malignancy.  BIRADS 2\par \par 6/17/22 - presented for follow up visit. Ms. Sepulveda is overall feeling well, still has some mild discomfort at times on her right side of her breast.  Mild hyperpigmentation remains on the skin.  Tolerating Anastrozole, has some hot flashes at night.  \par Continues to follow with Dr. Mena (surgery) and Dr. Max (oncology).  10 month follow up.  Some hyperpigmentation and scar induration right lateral treatment site, with some pain. Recommend exercise and massage.  FU 1 year \par \par 10/7/22 - Breast MRI:  RIGHT BREAST: Postoperative and radiation related changes in the right breast. No suspicious enhancement in the surgical bed. There is no suspicious enhancement in the right breast.  LEFT BREAST: There is no suspicious enhancement in the left breast. AXILLA/OTHER: There is no significant axillary or internal mammary lymphadenopathy. No MRI evidence of malignancy.   BIRADS 2\par \par 3/24/23 - Bilateral Diagnostic Mammogram/US:  IMPRESSION:  3 mm newly noted adjacent hypoechoic masses at the right 9:00, 5 cm from nipple location. Ultrasound-guided core biopsy is recommended.  No mammographic evidence of malignancy. No sonographic evidence of malignancy, left breast.  BIRADS 4A\par \par 3/30/23 - underwent RIGHT Breast, 9:00, 5 cm FN Biopsy:  Pathology - Benign Breast tissue with focal fibrosis.  \par \par 6/21/23 - presents for follow up visit.  Ms. Sepulveda is feeling well, she has no complaints.  Skin looks good, some mild skin thickening. She continues to moisturize her skin prn.  She continues to follow closely with Dr. Mena (surgeon) last seen 5/17/23 as well as Dr. Max ([facility]) due for visit 7/2023.  She continues on Anastrozole.

## 2023-06-21 NOTE — REVIEW OF SYSTEMS
[Negative] : Heme/Lymph [Pruritus: Grade 0] : Pruritus: Grade 0 [Skin Hyperpigmentation: Grade 0] : Skin Hyperpigmentation: Grade 0 [Skin Induration: Grade 1 - Mild induration, able to move skin parallel to plane (sliding) and perpendicular to skin (pinching up)] : Skin Induration: Grade 1 - Mild induration, able to move skin parallel to plane (sliding) and perpendicular to skin (pinching up) [Dermatitis Radiation: Grade 0] : Dermatitis Radiation: Grade 0

## 2023-06-30 ENCOUNTER — OUTPATIENT (OUTPATIENT)
Dept: OUTPATIENT SERVICES | Facility: HOSPITAL | Age: 58
LOS: 1 days | Discharge: HOME | End: 2023-06-30

## 2023-06-30 DIAGNOSIS — Z98.890 OTHER SPECIFIED POSTPROCEDURAL STATES: Chronic | ICD-10-CM

## 2023-06-30 DIAGNOSIS — C50.919 MALIGNANT NEOPLASM OF UNSPECIFIED SITE OF UNSPECIFIED FEMALE BREAST: ICD-10-CM

## 2023-07-12 ENCOUNTER — RESULT REVIEW (OUTPATIENT)
Age: 58
End: 2023-07-12

## 2023-07-12 ENCOUNTER — APPOINTMENT (OUTPATIENT)
Dept: HEMATOLOGY ONCOLOGY | Facility: CLINIC | Age: 58
End: 2023-07-12
Payer: MEDICAID

## 2023-07-12 VITALS
HEART RATE: 71 BPM | OXYGEN SATURATION: 99 % | DIASTOLIC BLOOD PRESSURE: 78 MMHG | SYSTOLIC BLOOD PRESSURE: 128 MMHG | TEMPERATURE: 159.8 F | WEIGHT: 190.7 LBS | BODY MASS INDEX: 31.73 KG/M2

## 2023-07-12 DIAGNOSIS — R23.2 FLUSHING: ICD-10-CM

## 2023-07-12 DIAGNOSIS — Z79.811 LONG TERM (CURRENT) USE OF AROMATASE INHIBITORS: ICD-10-CM

## 2023-07-12 LAB
BASOPHILS # BLD AUTO: 0.08 K/UL — SIGNIFICANT CHANGE UP (ref 0–0.2)
BASOPHILS NFR BLD AUTO: 1.3 % — SIGNIFICANT CHANGE UP (ref 0–2)
EOSINOPHIL # BLD AUTO: 0.29 K/UL — SIGNIFICANT CHANGE UP (ref 0–0.5)
EOSINOPHIL NFR BLD AUTO: 4.7 % — SIGNIFICANT CHANGE UP (ref 0–6)
HCT VFR BLD CALC: 41.7 % — SIGNIFICANT CHANGE UP (ref 34.5–45)
HGB BLD-MCNC: 13.8 G/DL — SIGNIFICANT CHANGE UP (ref 11.5–15.5)
IMM GRANULOCYTES NFR BLD AUTO: 0.3 % — SIGNIFICANT CHANGE UP (ref 0–0.9)
LYMPHOCYTES # BLD AUTO: 1.84 K/UL — SIGNIFICANT CHANGE UP (ref 1–3.3)
LYMPHOCYTES # BLD AUTO: 29.7 % — SIGNIFICANT CHANGE UP (ref 13–44)
MCHC RBC-ENTMCNC: 27.5 PG — SIGNIFICANT CHANGE UP (ref 27–34)
MCHC RBC-ENTMCNC: 33.1 G/DL — SIGNIFICANT CHANGE UP (ref 32–36)
MCV RBC AUTO: 83.2 FL — SIGNIFICANT CHANGE UP (ref 80–100)
MONOCYTES # BLD AUTO: 0.48 K/UL — SIGNIFICANT CHANGE UP (ref 0–0.9)
MONOCYTES NFR BLD AUTO: 7.7 % — SIGNIFICANT CHANGE UP (ref 2–14)
NEUTROPHILS # BLD AUTO: 3.49 K/UL — SIGNIFICANT CHANGE UP (ref 1.8–7.4)
NEUTROPHILS NFR BLD AUTO: 56.3 % — SIGNIFICANT CHANGE UP (ref 43–77)
NRBC # BLD: 0 /100 WBCS — SIGNIFICANT CHANGE UP (ref 0–0)
PLATELET # BLD AUTO: 311 K/UL — SIGNIFICANT CHANGE UP (ref 150–400)
RBC # BLD: 5.01 M/UL — SIGNIFICANT CHANGE UP (ref 3.8–5.2)
RBC # FLD: 12 % — SIGNIFICANT CHANGE UP (ref 10.3–14.5)
WBC # BLD: 6.2 K/UL — SIGNIFICANT CHANGE UP (ref 3.8–10.5)
WBC # FLD AUTO: 6.2 K/UL — SIGNIFICANT CHANGE UP (ref 3.8–10.5)

## 2023-07-12 PROCEDURE — 99214 OFFICE O/P EST MOD 30 MIN: CPT

## 2023-07-12 NOTE — REASON FOR VISIT
[Follow-Up Visit] : a follow-up [[employer] ] : provided by [employer]   [FreeTextEntry2] : breast ca, odx 9 [FreeTextEntry3] : sarah grimes, [id number], [place]

## 2023-07-12 NOTE — ASSESSMENT
[FreeTextEntry1] : In summary, Ms. SHANTAL HANSON is a 56 year old postmenopausal female with stage IA (T1c, N0, M0) ER positive, KS positive, HER-2/katarzyna negative invasive ductal carcinoma of the right breast. She is status post lumpectomy, met with Dr Avila completed RT on 9/7/21. ODX 9 . She started anastrozole 9/22/21\par \par 1. Breast ca- Ms. SHANTAL HANSON  is tolerating AI well, good compliance. She has mild side effects from the treatment. Continue treatment with Arimidex 1 mg daily for five-7  years.  Annual breast imaging by Dr. Milla Mena. Will get MRI in October 2023\par -Mammogram 03/2023 BIRADS 4A s/p biopsy showing benign breast tissue with focal fibrosis. Repeat mammogram in 1 year.\par 2. Concern for worsening bone density and fractures due to anastrozole. Rec to continue calcium and vit D. DEXA 1-2 yrs Baseline DEXA  06/23/2021 DEXA Normal. Will repeat this year, 2023\par 3. Low Vitamin D: concern for worsening bone loss due to anastrozole and low vit D. \par 4. Concern for worsening cholesterol/CAD risk factors due to anastrozole. Lipid profile annually. Life style modifications d/w her.\par \par 	\par RTC 6 m

## 2023-07-12 NOTE — HISTORY OF PRESENT ILLNESS
[Date: ____________] : Patient's last distress assessment performed on [unfilled]. [7 - Distress Level] : Distress Level: 7 [Patient given social work contact information and resource sheet] : Patient was given social work contact information and resource sheet [de-identified] : Ms. SHANTAL HANSON is a 56 year old female here for an evaluation of breast cancer. Her oncologic history is as follows:\par \par Screening mammo 3/19/2021: R outer/upper outer breast 2.5 cm mass and R lateral to mass 6 mm cluster of calcs. Diagnostic right mammo 3/24/21 showed R upper outer breast mass 1.4 x 1.6 cm with heterogeneous microcalcifications. Sono:mass at 10:00: 1.5 x 1 x 0.9 cm irregular hypoechoic mass. \par \par 3/31/2021: Right breast biopsy 10:00: invasive moderately diff ductal carcinoma, DCIS, intermediate grade, focal. ER >98%, WI >98%, HER2 negative\par \par 4/16/2021 MRI: concordant with known malignancy right breast. No suspicious findings in left breast\par \par 5/17/2021: R lumpectomy and SLNB revealed 1.5 cm IDC, grade 2; 7 mm DCIS, low to intermediate grade. Margins negative(Fragmented nature of the final margin specimens preclude assessment of the distance). 0/3 SLNB. ER : Positive, > 98%, PgR : Positive, > 98%, HER2: Negative. pT1cN0, under care of Dr Mena.\par \par  Oncotype DX 9\par \par LMP 10 years ago [de-identified] : Ms. SHANTAL HANSON  is here for a follow up appt for stage IA (T1c, N0, M0) ER positive, ND positive, HER-2/katarzyna negative invasive ductal carcinoma of the right breast. ODX 9. Completed RT 9/7/21. Started anastrozole 9/22/21 \par \par 7/12/23\par Takes Anastrozole daily, good compliance. She reports hot flashes daily,  denies aches/pain, vag dryness, excessive fatigue, GI s/e, hair loss. She is active, no change in energy, wt or appetite. \par mammogram - 4/2023 birads 2\par DEXA-  06/23/2021 DEXA Normal.  She takes ca+vit \par

## 2023-07-12 NOTE — PHYSICAL EXAM
[Fully active, able to carry on all pre-disease performance without restriction] : Status 0 - Fully active, able to carry on all pre-disease performance without restriction [Normal] : affect appropriate [de-identified] : Post RT skin changes noted, right lumpectomy scar healing well

## 2023-07-12 NOTE — CONSULT LETTER
[Please see my note below.] : Please see my note below. [Consult Closing:] : Thank you very much for allowing me to participate in the care of this patient.  If you have any questions, please do not hesitate to contact me. [Sincerely,] : Sincerely, [FreeTextEntry3] : Camila Max MD\par Division of Medical Oncology and Hematology\par [facility]\par [employer] [facility]\par [place], [place]\par \par

## 2023-07-13 LAB
ALBUMIN SERPL ELPH-MCNC: 4.6 G/DL
ALP BLD-CCNC: 69 U/L
ALT SERPL-CCNC: 33 U/L
ANION GAP SERPL CALC-SCNC: 11 MMOL/L
AST SERPL-CCNC: 30 U/L
BILIRUB SERPL-MCNC: 0.3 MG/DL
BUN SERPL-MCNC: 12 MG/DL
CALCIUM SERPL-MCNC: 10.4 MG/DL
CHLORIDE SERPL-SCNC: 104 MMOL/L
CO2 SERPL-SCNC: 27 MMOL/L
CREAT SERPL-MCNC: 0.7 MG/DL
EGFR: 101 ML/MIN/1.73M2
GLUCOSE SERPL-MCNC: 115 MG/DL
POTASSIUM SERPL-SCNC: 4 MMOL/L
PROT SERPL-MCNC: 7.6 G/DL
SODIUM SERPL-SCNC: 143 MMOL/L

## 2023-08-21 ENCOUNTER — OUTPATIENT (OUTPATIENT)
Dept: OUTPATIENT SERVICES | Facility: HOSPITAL | Age: 58
LOS: 1 days | End: 2023-08-21
Payer: MEDICAID

## 2023-08-21 ENCOUNTER — APPOINTMENT (OUTPATIENT)
Dept: RADIOLOGY | Facility: IMAGING CENTER | Age: 58
End: 2023-08-21
Payer: MEDICAID

## 2023-08-21 DIAGNOSIS — C50.919 MALIGNANT NEOPLASM OF UNSPECIFIED SITE OF UNSPECIFIED FEMALE BREAST: ICD-10-CM

## 2023-08-21 DIAGNOSIS — Z98.890 OTHER SPECIFIED POSTPROCEDURAL STATES: Chronic | ICD-10-CM

## 2023-08-21 PROCEDURE — 77085 DXA BONE DENSITY AXL VRT FX: CPT | Mod: 26

## 2023-08-21 PROCEDURE — 77085 DXA BONE DENSITY AXL VRT FX: CPT

## 2023-10-01 PROBLEM — Z92.3 HISTORY OF RADIATION THERAPY: Status: RESOLVED | Noted: 2022-06-15 | Resolved: 2023-10-01

## 2023-10-09 ENCOUNTER — RX RENEWAL (OUTPATIENT)
Age: 58
End: 2023-10-09

## 2023-10-10 ENCOUNTER — OUTPATIENT (OUTPATIENT)
Dept: OUTPATIENT SERVICES | Facility: HOSPITAL | Age: 58
LOS: 1 days | End: 2023-10-10
Payer: MEDICAID

## 2023-10-10 ENCOUNTER — APPOINTMENT (OUTPATIENT)
Dept: MRI IMAGING | Facility: IMAGING CENTER | Age: 58
End: 2023-10-10
Payer: MEDICAID

## 2023-10-10 DIAGNOSIS — Z98.890 OTHER SPECIFIED POSTPROCEDURAL STATES: Chronic | ICD-10-CM

## 2023-10-10 DIAGNOSIS — C50.919 MALIGNANT NEOPLASM OF UNSPECIFIED SITE OF UNSPECIFIED FEMALE BREAST: ICD-10-CM

## 2023-10-10 DIAGNOSIS — Z00.00 ENCOUNTER FOR GENERAL ADULT MEDICAL EXAMINATION WITHOUT ABNORMAL FINDINGS: ICD-10-CM

## 2023-10-10 PROCEDURE — 77049 MRI BREAST C-+ W/CAD BI: CPT | Mod: 26

## 2023-10-10 PROCEDURE — C8908: CPT

## 2023-10-10 PROCEDURE — C8937: CPT

## 2023-10-10 PROCEDURE — A9585: CPT

## 2023-10-12 ENCOUNTER — RX RENEWAL (OUTPATIENT)
Age: 58
End: 2023-10-12

## 2023-11-13 ENCOUNTER — APPOINTMENT (OUTPATIENT)
Dept: INTERNAL MEDICINE | Facility: CLINIC | Age: 58
End: 2023-11-13
Payer: MEDICAID

## 2023-11-13 ENCOUNTER — OUTPATIENT (OUTPATIENT)
Dept: OUTPATIENT SERVICES | Facility: HOSPITAL | Age: 58
LOS: 1 days | End: 2023-11-13

## 2023-11-13 ENCOUNTER — MED ADMIN CHARGE (OUTPATIENT)
Age: 58
End: 2023-11-13

## 2023-11-13 VITALS
WEIGHT: 190 LBS | SYSTOLIC BLOOD PRESSURE: 132 MMHG | HEIGHT: 65 IN | HEART RATE: 87 BPM | OXYGEN SATURATION: 97 % | BODY MASS INDEX: 31.65 KG/M2 | DIASTOLIC BLOOD PRESSURE: 74 MMHG

## 2023-11-13 DIAGNOSIS — Z00.00 ENCOUNTER FOR GENERAL ADULT MEDICAL EXAMINATION W/OUT ABNORMAL FINDINGS: ICD-10-CM

## 2023-11-13 DIAGNOSIS — Z00.00 ENCOUNTER FOR GENERAL ADULT MEDICAL EXAMINATION WITHOUT ABNORMAL FINDINGS: ICD-10-CM

## 2023-11-13 DIAGNOSIS — Z98.890 OTHER SPECIFIED POSTPROCEDURAL STATES: Chronic | ICD-10-CM

## 2023-11-13 DIAGNOSIS — E04.2 NONTOXIC MULTINODULAR GOITER: ICD-10-CM

## 2023-11-13 DIAGNOSIS — N63.10 UNSPECIFIED LUMP IN THE RIGHT BREAST, UNSPECIFIED QUADRANT: ICD-10-CM

## 2023-11-13 DIAGNOSIS — E11.9 TYPE 2 DIABETES MELLITUS WITHOUT COMPLICATIONS: ICD-10-CM

## 2023-11-13 DIAGNOSIS — I10 ESSENTIAL (PRIMARY) HYPERTENSION: ICD-10-CM

## 2023-11-13 PROCEDURE — 99213 OFFICE O/P EST LOW 20 MIN: CPT | Mod: GE,25

## 2023-11-14 LAB
ANION GAP SERPL CALC-SCNC: 13 MMOL/L
BUN SERPL-MCNC: 10 MG/DL
CALCIUM SERPL-MCNC: 10.3 MG/DL
CHLORIDE SERPL-SCNC: 105 MMOL/L
CHOLEST SERPL-MCNC: 161 MG/DL
CO2 SERPL-SCNC: 26 MMOL/L
CREAT SERPL-MCNC: 0.64 MG/DL
CREAT SPEC-SCNC: 29 MG/DL
EGFR: 103 ML/MIN/1.73M2
ESTIMATED AVERAGE GLUCOSE: 163 MG/DL
GLUCOSE SERPL-MCNC: 122 MG/DL
HBA1C MFR BLD HPLC: 7.3 %
HDLC SERPL-MCNC: 57 MG/DL
LDLC SERPL CALC-MCNC: 81 MG/DL
MICROALBUMIN 24H UR DL<=1MG/L-MCNC: 3.5 MG/DL
MICROALBUMIN/CREAT 24H UR-RTO: 119 MG/G
NONHDLC SERPL-MCNC: 103 MG/DL
POTASSIUM SERPL-SCNC: 4.6 MMOL/L
SODIUM SERPL-SCNC: 144 MMOL/L
TRIGL SERPL-MCNC: 127 MG/DL
TSH SERPL-ACNC: 1.46 UIU/ML

## 2023-11-15 ENCOUNTER — APPOINTMENT (OUTPATIENT)
Dept: SURGICAL ONCOLOGY | Facility: CLINIC | Age: 58
End: 2023-11-15
Payer: MEDICAID

## 2023-11-15 VITALS
OXYGEN SATURATION: 98 % | HEIGHT: 65 IN | HEART RATE: 111 BPM | BODY MASS INDEX: 31.65 KG/M2 | DIASTOLIC BLOOD PRESSURE: 84 MMHG | RESPIRATION RATE: 17 BRPM | SYSTOLIC BLOOD PRESSURE: 149 MMHG | WEIGHT: 190 LBS

## 2023-11-15 PROCEDURE — 99213 OFFICE O/P EST LOW 20 MIN: CPT

## 2023-11-15 PROCEDURE — T1013A: CUSTOM

## 2023-12-07 ENCOUNTER — APPOINTMENT (OUTPATIENT)
Dept: ENDOCRINOLOGY | Facility: CLINIC | Age: 58
End: 2023-12-07

## 2023-12-18 ENCOUNTER — RX RENEWAL (OUTPATIENT)
Age: 58
End: 2023-12-18

## 2023-12-19 ENCOUNTER — APPOINTMENT (OUTPATIENT)
Dept: GASTROENTEROLOGY | Facility: CLINIC | Age: 58
End: 2023-12-19
Payer: MEDICAID

## 2023-12-19 VITALS
HEIGHT: 65 IN | OXYGEN SATURATION: 98 % | SYSTOLIC BLOOD PRESSURE: 158 MMHG | BODY MASS INDEX: 31.99 KG/M2 | HEART RATE: 78 BPM | WEIGHT: 192 LBS | DIASTOLIC BLOOD PRESSURE: 83 MMHG | TEMPERATURE: 97.5 F

## 2023-12-19 DIAGNOSIS — Z12.12 ENCOUNTER FOR SCREENING FOR MALIGNANT NEOPLASM OF COLON: ICD-10-CM

## 2023-12-19 DIAGNOSIS — E11.9 TYPE 2 DIABETES MELLITUS W/OUT COMPLICATIONS: ICD-10-CM

## 2023-12-19 DIAGNOSIS — I10 ESSENTIAL (PRIMARY) HYPERTENSION: ICD-10-CM

## 2023-12-19 DIAGNOSIS — Z12.11 ENCOUNTER FOR SCREENING FOR MALIGNANT NEOPLASM OF COLON: ICD-10-CM

## 2023-12-19 DIAGNOSIS — K80.20 CALCULUS OF GALLBLADDER W/OUT CHOLECYSTITIS W/OUT OBSTRUCTION: ICD-10-CM

## 2023-12-19 PROCEDURE — 99204 OFFICE O/P NEW MOD 45 MIN: CPT

## 2023-12-19 PROCEDURE — T1013A: CUSTOM

## 2023-12-19 RX ORDER — FAMOTIDINE 20 MG/1
20 TABLET, FILM COATED ORAL
Qty: 60 | Refills: 0 | Status: DISCONTINUED | COMMUNITY
Start: 2022-09-21 | End: 2023-12-19

## 2023-12-19 RX ORDER — SULFAMETHOXAZOLE AND TRIMETHOPRIM 400; 80 MG/1; MG/1
400-80 TABLET ORAL DAILY
Qty: 7 | Refills: 0 | Status: DISCONTINUED | COMMUNITY
Start: 2022-11-28 | End: 2023-12-19

## 2023-12-19 RX ORDER — CHLORHEXIDINE GLUCONATE 213 G/1000ML
4 SOLUTION TOPICAL
Qty: 1 | Refills: 2 | Status: DISCONTINUED | COMMUNITY
Start: 2022-11-28 | End: 2023-12-19

## 2023-12-19 RX ORDER — ORAL SEMAGLUTIDE 3 MG/1
3 TABLET ORAL
Qty: 30 | Refills: 3 | Status: DISCONTINUED | COMMUNITY
Start: 2022-11-28 | End: 2023-12-19

## 2023-12-19 RX ORDER — ATORVASTATIN CALCIUM 10 MG/1
10 TABLET, FILM COATED ORAL
Qty: 90 | Refills: 1 | Status: DISCONTINUED | COMMUNITY
Start: 2022-09-21 | End: 2023-12-19

## 2023-12-19 NOTE — REASON FOR VISIT
[Consultation] : a consultation visit [[employer] ] : provided by [employer]   [Time Spent: ____ minutes] : Total time spent using  services: [unfilled] minutes. The patient's primary language is not English thus required  services. [FreeTextEntry1] : screening colonoscopy [Interpreters_IDNumber] : [id number] [Interpreters_FullName] : Jesus Alberto [TWNoteComboBox1] : Chadian

## 2023-12-19 NOTE — ASSESSMENT
[FreeTextEntry1] : Screening colonoscopy.  First colonoscopy.  The importance of colon cancer screening and the colonoscopy prep was discussed with the patient.  She understands and all questions were answered via .

## 2023-12-19 NOTE — PHYSICAL EXAM
[Alert] : alert [Normal Voice/Communication] : normal voice/communication [Healthy Appearing] : healthy appearing [No Acute Distress] : no acute distress [Sclera] : the sclera and conjunctiva were normal [Hearing Threshold Finger Rub Not [place]] : hearing was normal [Normal Lips/Gums] : the lips and gums were normal [Oropharynx] : the oropharynx was normal [Normal Appearance] : the appearance of the neck was normal [No Neck Mass] : no neck mass was observed [No Respiratory Distress] : no respiratory distress [No Acc Muscle Use] : no accessory muscle use [Respiration, Rhythm And Depth] : normal respiratory rhythm and effort [Auscultation Breath Sounds / Voice Sounds] : lungs were clear to auscultation bilaterally [Normal S1, S2] : normal S1 and S2 [Heart Rate And Rhythm] : heart rate was normal and rhythm regular [Murmurs] : no murmurs [None] : no edema [Bowel Sounds] : normal bowel sounds [Abdomen Tenderness] : non-tender [No Masses] : no abdominal mass palpated [Abdomen Soft] : soft [] : no hepatosplenomegaly [Cervical Lymph Nodes Enlarged Posterior Bilaterally] : no posterior cervical lymphadenopathy [Supraclavicular Lymph Nodes Enlarged Bilaterally] : no supraclavicular lymphadenopathy [No CVA Tenderness] : no CVA  tenderness [Abnormal Walk] : normal gait [Motor Exam] : the motor exam was normal [Normal] : oriented to person, place, and time [Oriented To Time, Place, And Person] : oriented to person, place, and time [Normal Affect] : the affect was normal [Normal Mood] : the mood was normal

## 2023-12-19 NOTE — HISTORY OF PRESENT ILLNESS
[FreeTextEntry1] : History was obtained from a .  58-year-old diabetic woman referred for a screening colonoscopy.  She has a history of right breast cancer for which she had a lumpectomy.  She denies rectal bleeding, melena or hematemesis.  Previous sonogram showed asymptomatic gallstones.  She denies abdominal pain.

## 2023-12-19 NOTE — CONSULT LETTER
[Dear  ___] : Dear  [unfilled], [Consult Letter:] : I had the pleasure of evaluating your patient, [unfilled]. [Please see my note below.] : Please see my note below. [Consult Closing:] : Thank you very much for allowing me to participate in the care of this patient.  If you have any questions, please do not hesitate to contact me. [Sincerely,] : Sincerely, [FreeTextEntry3] : Michael Crawford MD, FACG

## 2023-12-21 ENCOUNTER — RX RENEWAL (OUTPATIENT)
Age: 58
End: 2023-12-21

## 2023-12-22 ENCOUNTER — OUTPATIENT (OUTPATIENT)
Dept: OUTPATIENT SERVICES | Facility: HOSPITAL | Age: 58
LOS: 1 days | Discharge: HOME | End: 2023-12-22

## 2023-12-22 DIAGNOSIS — Z98.890 OTHER SPECIFIED POSTPROCEDURAL STATES: Chronic | ICD-10-CM

## 2023-12-22 DIAGNOSIS — C50.919 MALIGNANT NEOPLASM OF UNSPECIFIED SITE OF UNSPECIFIED FEMALE BREAST: ICD-10-CM

## 2024-01-04 ENCOUNTER — APPOINTMENT (OUTPATIENT)
Dept: OPHTHALMOLOGY | Facility: CLINIC | Age: 59
End: 2024-01-04

## 2024-01-08 ENCOUNTER — APPOINTMENT (OUTPATIENT)
Dept: HEMATOLOGY ONCOLOGY | Facility: CLINIC | Age: 59
End: 2024-01-08
Payer: MEDICAID

## 2024-01-08 VITALS
HEIGHT: 66.14 IN | RESPIRATION RATE: 17 BRPM | SYSTOLIC BLOOD PRESSURE: 137 MMHG | BODY MASS INDEX: 30.65 KG/M2 | HEART RATE: 74 BPM | WEIGHT: 190.7 LBS | OXYGEN SATURATION: 99 % | DIASTOLIC BLOOD PRESSURE: 79 MMHG | TEMPERATURE: 97.6 F

## 2024-01-08 PROCEDURE — 99214 OFFICE O/P EST MOD 30 MIN: CPT

## 2024-01-08 RX ORDER — ANASTROZOLE TABLETS 1 MG/1
1 TABLET ORAL
Qty: 90 | Refills: 1 | Status: ACTIVE | COMMUNITY
Start: 2021-06-11 | End: 1900-01-01

## 2024-01-08 NOTE — CONSULT LETTER
[Please see my note below.] : Please see my note below. [Consult Closing:] : Thank you very much for allowing me to participate in the care of this patient.  If you have any questions, please do not hesitate to contact me. [Sincerely,] : Sincerely, [FreeTextEntry3] : Camial Max MD\par  Division of Medical Oncology and Hematology\par  [facility] [place]\par  [employer] [facility]\par  [place], [place]\par  \par

## 2024-01-08 NOTE — ASSESSMENT
[FreeTextEntry1] : In summary, Ms. SHANTAL HANSON is a 56 year old postmenopausal female with stage IA (T1c, N0, M0) ER positive, VT positive, HER-2/katarzyna negative invasive ductal carcinoma of the right breast. She is status post lumpectomy, met with Dr Avila completed RT on 9/7/21. ODX 9. She started anastrozole 9/22/21  1. Breast ca- Ms. SHANTAL HANSON is tolerating AI well, good compliance. She has mild side effects from the treatment. Continue treatment with Arimidex 1 mg daily for five-7 years. Annual breast imaging due 3/2024 -Mammogram 03/2023 BIRADS 4A s/p biopsy showing benign breast tissue with focal fibrosis. Repeat mammogram in 1 year. 2. Concern for worsening bone density and fractures due to anastrozole. Rec to continue calcium and vit D. DEXA 1-2 yrs  3.. Concern for worsening cholesterol/CAD risk factors due to anastrozole. Lipid profile annually. Life style modifications d/w her.  RTC 6 m.

## 2024-01-08 NOTE — PHYSICAL EXAM
[Fully active, able to carry on all pre-disease performance without restriction] : Status 0 - Fully active, able to carry on all pre-disease performance without restriction [Normal] : affect appropriate [de-identified] : Post RT skin changes noted, right lumpectomy scar healing well

## 2024-01-08 NOTE — REASON FOR VISIT
[Follow-Up Visit] : a follow-up [[employer] ] : provided by [employer]   [FreeTextEntry2] : breast ca, odx 9 [FreeTextEntry3] : Adin, [id number] Bengali

## 2024-01-08 NOTE — HISTORY OF PRESENT ILLNESS
[de-identified] : Ms. SHANTAL HANSON is a 56 year old female here for an evaluation of breast cancer. Her oncologic history is as follows:\par  \par  Screening mammo 3/19/2021: R outer/upper outer breast 2.5 cm mass and R lateral to mass 6 mm cluster of calcs. Diagnostic right mammo 3/24/21 showed R upper outer breast mass 1.4 x 1.6 cm with heterogeneous microcalcifications. Sono:mass at 10:00: 1.5 x 1 x 0.9 cm irregular hypoechoic mass. \par  \par  3/31/2021: Right breast biopsy 10:00: invasive moderately diff ductal carcinoma, DCIS, intermediate grade, focal. ER >98%, [place] >98%, HER2 negative\par  \par  4/16/2021 MRI: concordant with known malignancy right breast. No suspicious findings in left breast\par  \par  5/17/2021: R lumpectomy and SLNB revealed 1.5 cm IDC, grade 2; 7 mm DCIS, low to intermediate grade. Margins negative(Fragmented nature of the final margin specimens preclude assessment of the distance). 0/3 SLNB. ER : Positive, > 98%, PgR : Positive, > 98%, HER2: Negative. pT1cN0, under care of Dr Mena.\par  \par   Oncotype DX 9\par  \par  LMP 10 years ago [de-identified] : Ms. SHANTAL HANSON  is here for a follow up appt for stage IA (T1c, N0, M0) ER positive, IL positive, HER-2/katarzyna negative invasive ductal carcinoma of the right breast. ODX 9. Completed RT 9/7/21. Started anastrozole 9/22/21   Takes Anastrozole daily, good compliance. She reports hot flashes daily,  denies aches/pain, vag dryness, excessive fatigue, GI s/e, hair loss. She is active, no change in energy, wt or appetite.  mammogram - 4/2023 birads 2 DEXA-  06/23/2021 , 8/2023 DEXA Normal.  She takes ca+vit d  [Date: ____________] : Patient's last distress assessment performed on [unfilled]. [7 - Distress Level] : Distress Level: 7 [Patient given social work contact information and resource sheet] : Patient was given social work contact information and resource sheet

## 2024-02-26 ENCOUNTER — APPOINTMENT (OUTPATIENT)
Dept: INTERNAL MEDICINE | Facility: CLINIC | Age: 59
End: 2024-02-26

## 2024-03-06 NOTE — H&P PST ADULT - EXTREMITIES
Problem: Discharge Planning  Goal: Discharge to home or other facility with appropriate resources  Outcome: Progressing     Problem: Skin/Tissue Integrity  Goal: Absence of new skin breakdown  Description: 1.  Monitor for areas of redness and/or skin breakdown  2.  Assess vascular access sites hourly  3.  Every 4-6 hours minimum:  Change oxygen saturation probe site  4.  Every 4-6 hours:  If on nasal continuous positive airway pressure, respiratory therapy assess nares and determine need for appliance change or resting period.  Outcome: Progressing  Note: He already have bedsores, and risk of developing new one.  Repositioned 2 hourly, maintain with specialty mattress.  Keep skin clean and dry.  To encourage him to eat once diet resumes.       Problem: Safety - Adult  Goal: Free from fall injury  Outcome: Progressing  Note: Maintain all the safety measures provided.     Problem: Respiratory - Adult  Goal: Achieves optimal ventilation and oxygenation  Outcome: Progressing  Flowsheets (Taken 3/6/2024 0118)  Achieves optimal ventilation and oxygenation:   Assess for changes in respiratory status   Assess for changes in mentation and behavior   Position to facilitate oxygenation and minimize respiratory effort   Oxygen supplementation based on oxygen saturation or arterial blood gases     Problem: Infection - Adult  Goal: Absence of infection at discharge  Outcome: Progressing  Flowsheets (Taken 3/6/2024 0118)  Absence of infection at discharge:   Assess and monitor for signs and symptoms of infection   Monitor lab/diagnostic results   Monitor all insertion sites i.e., indwelling lines, tubes and drains   Administer medications as ordered      No cyanosis, clubbing or edema

## 2024-03-18 ENCOUNTER — RX RENEWAL (OUTPATIENT)
Age: 59
End: 2024-03-18

## 2024-04-03 ENCOUNTER — APPOINTMENT (OUTPATIENT)
Dept: INTERNAL MEDICINE | Facility: CLINIC | Age: 59
End: 2024-04-03

## 2024-04-11 ENCOUNTER — RESULT REVIEW (OUTPATIENT)
Age: 59
End: 2024-04-11

## 2024-04-11 ENCOUNTER — APPOINTMENT (OUTPATIENT)
Dept: MAMMOGRAPHY | Facility: IMAGING CENTER | Age: 59
End: 2024-04-11
Payer: MEDICAID

## 2024-04-11 ENCOUNTER — APPOINTMENT (OUTPATIENT)
Dept: ULTRASOUND IMAGING | Facility: IMAGING CENTER | Age: 59
End: 2024-04-11
Payer: MEDICAID

## 2024-04-11 ENCOUNTER — OUTPATIENT (OUTPATIENT)
Dept: OUTPATIENT SERVICES | Facility: HOSPITAL | Age: 59
LOS: 1 days | End: 2024-04-11
Payer: MEDICAID

## 2024-04-11 DIAGNOSIS — Z98.890 OTHER SPECIFIED POSTPROCEDURAL STATES: Chronic | ICD-10-CM

## 2024-04-11 DIAGNOSIS — C50.919 MALIGNANT NEOPLASM OF UNSPECIFIED SITE OF UNSPECIFIED FEMALE BREAST: ICD-10-CM

## 2024-04-11 PROCEDURE — 76641 ULTRASOUND BREAST COMPLETE: CPT | Mod: 26,50

## 2024-04-11 PROCEDURE — 77066 DX MAMMO INCL CAD BI: CPT

## 2024-04-11 PROCEDURE — 77066 DX MAMMO INCL CAD BI: CPT | Mod: 26

## 2024-04-11 PROCEDURE — 77062 BREAST TOMOSYNTHESIS BI: CPT | Mod: 26

## 2024-04-11 PROCEDURE — 76641 ULTRASOUND BREAST COMPLETE: CPT

## 2024-04-11 PROCEDURE — G0279: CPT

## 2024-04-17 ENCOUNTER — RESULT REVIEW (OUTPATIENT)
Age: 59
End: 2024-04-17

## 2024-04-17 ENCOUNTER — APPOINTMENT (OUTPATIENT)
Dept: GASTROENTEROLOGY | Facility: HOSPITAL | Age: 59
End: 2024-04-17

## 2024-04-17 ENCOUNTER — TRANSCRIPTION ENCOUNTER (OUTPATIENT)
Age: 59
End: 2024-04-17

## 2024-04-17 ENCOUNTER — OUTPATIENT (OUTPATIENT)
Dept: OUTPATIENT SERVICES | Facility: HOSPITAL | Age: 59
LOS: 1 days | End: 2024-04-17
Payer: MEDICAID

## 2024-04-17 VITALS
HEART RATE: 75 BPM | DIASTOLIC BLOOD PRESSURE: 62 MMHG | SYSTOLIC BLOOD PRESSURE: 116 MMHG | RESPIRATION RATE: 18 BRPM | OXYGEN SATURATION: 94 %

## 2024-04-17 VITALS
DIASTOLIC BLOOD PRESSURE: 78 MMHG | OXYGEN SATURATION: 100 % | HEIGHT: 65 IN | TEMPERATURE: 99 F | RESPIRATION RATE: 20 BRPM | WEIGHT: 186.95 LBS | HEART RATE: 100 BPM | SYSTOLIC BLOOD PRESSURE: 123 MMHG

## 2024-04-17 DIAGNOSIS — R19.7 DIARRHEA, UNSPECIFIED: ICD-10-CM

## 2024-04-17 DIAGNOSIS — Z98.890 OTHER SPECIFIED POSTPROCEDURAL STATES: Chronic | ICD-10-CM

## 2024-04-17 LAB — GLUCOSE BLDC GLUCOMTR-MCNC: 138 MG/DL — HIGH (ref 70–99)

## 2024-04-17 PROCEDURE — 88305 TISSUE EXAM BY PATHOLOGIST: CPT | Mod: 26

## 2024-04-17 PROCEDURE — 45380 COLONOSCOPY AND BIOPSY: CPT

## 2024-04-17 PROCEDURE — 88305 TISSUE EXAM BY PATHOLOGIST: CPT

## 2024-04-17 PROCEDURE — 82962 GLUCOSE BLOOD TEST: CPT

## 2024-04-17 RX ORDER — ANASTROZOLE 1 MG/1
1 TABLET ORAL
Refills: 0 | DISCHARGE

## 2024-04-17 RX ORDER — HYDROCHLOROTHIAZIDE 25 MG
1 TABLET ORAL
Qty: 0 | Refills: 0 | DISCHARGE

## 2024-04-17 RX ORDER — LISINOPRIL 2.5 MG/1
1 TABLET ORAL
Qty: 0 | Refills: 0 | DISCHARGE

## 2024-04-17 RX ORDER — OMEPRAZOLE 10 MG/1
1 CAPSULE, DELAYED RELEASE ORAL
Qty: 0 | Refills: 0 | DISCHARGE

## 2024-04-17 RX ORDER — SODIUM CHLORIDE 9 MG/ML
1000 INJECTION, SOLUTION INTRAVENOUS
Refills: 0 | Status: DISCONTINUED | OUTPATIENT
Start: 2024-04-17 | End: 2024-05-01

## 2024-04-17 RX ORDER — SODIUM CHLORIDE 9 MG/ML
500 INJECTION INTRAMUSCULAR; INTRAVENOUS; SUBCUTANEOUS
Refills: 0 | Status: DISCONTINUED | OUTPATIENT
Start: 2024-04-17 | End: 2024-05-01

## 2024-04-17 RX ORDER — POTASSIUM CHLORIDE 20 MEQ
2 PACKET (EA) ORAL
Qty: 0 | Refills: 0 | DISCHARGE

## 2024-04-17 RX ORDER — METFORMIN HYDROCHLORIDE 850 MG/1
1 TABLET ORAL
Qty: 0 | Refills: 0 | DISCHARGE

## 2024-04-17 NOTE — PRE PROCEDURE NOTE - PRE PROCEDURE EVALUATION
Attending Physician:  Dr. Lawler                Procedure: colonoscopy    Indication for Procedure: CRC screening  ________________________________________________________  PAST MEDICAL & SURGICAL HISTORY:  HTN (hypertension)      Diabetes      Thyroid nodule      Breast CA      S/P breast biopsy  4/2021        ALLERGIES:  No Known Drug Allergies  latex (Anaphylaxis)    HOME MEDICATIONS:  acetaminophen 325 mg oral tablet: 3 tab(s) orally every 6 hours  anastrozole 1 mg oral tablet: 1 tab(s) orally once a day  hydroCHLOROthiazide 25 mg oral tablet: 1 tab(s) orally once a day  lisinopril 20 mg oral tablet: 1 tab(s) orally once a day  metFORMIN 500 mg oral tablet: 1 tab(s) orally once a day  omeprazole 40 mg oral delayed release capsule: 1 cap(s) orally once a day  potassium chloride 10 mEq oral capsule, extended release: 2 cap(s) orally once a day    AICD/PPM: [ ] yes   [x ] no    PERTINENT LAB DATA:                      PHYSICAL EXAMINATION:    Height (cm): 165.1  Weight (kg): 84.8  BMI (kg/m2): 31.1  BSA (m2): 1.92T(C): --  HR: --  BP: --  RR: --  SpO2: --    Constitutional: NAD  HEENT: PERRLA, EOMI,    Neck:  No JVD  Respiratory: CTAB/L  Cardiovascular: S1 and S2  Gastrointestinal: BS+, soft, NT/ND  Extremities: No peripheral edema  Neurological: A/O x 3, no focal deficits  Psychiatric: Normal mood, normal affect  Skin: No rashes    ASA Class: I [ ]  II [ x]  III [ ]  IV [ ]    COMMENTS:    The patient is a suitable candidate for the planned procedure unless box checked [ ]  No, explain:

## 2024-04-17 NOTE — PRE-ANESTHESIA EVALUATION ADULT - NSANTHPMHFT_GEN_ALL_CORE
60 yo F w/ PMNHx of HTN, DM, Thyroid nodule, GERD, breast CA s/p resection presents for screening colonoscopy.    Denies active CP/SOB/Orthopnea  Denies hx of MI/CHF

## 2024-04-17 NOTE — ASU DISCHARGE PLAN (ADULT/PEDIATRIC) - NS MD DC FALL RISK RISK
For information on Fall & Injury Prevention, visit: [email or web address] OR  [email or web address] OR  https://www.cdc.gov/steadi/patient.html

## 2024-04-17 NOTE — ASU DISCHARGE PLAN (ADULT/PEDIATRIC) - ***IN THE EVENT THAT YOU DEVELOP A COMPLICATION AND YOU ARE UNABLE TO REACH YOUR OWN PHYSICIAN, YOU MAY CONTACT:
"You were seen in the clinic today by Dr. Govea. If you have any questions or concerns after your appointment, please call the clinic at [phone]. Press \"1\" for scheduling, press \"3\" for nurse advice.      2.   Plan to return to the clinic in 4-5 months for a phone visit    Jennifer GARCES, RN  [facility]  Department of Otolaryngology  [phone]     " Statement Selected

## 2024-04-22 LAB — SURGICAL PATHOLOGY STUDY: SIGNIFICANT CHANGE UP

## 2024-04-24 ENCOUNTER — OUTPATIENT (OUTPATIENT)
Dept: OUTPATIENT SERVICES | Facility: HOSPITAL | Age: 59
LOS: 1 days | End: 2024-04-24
Payer: MEDICAID

## 2024-04-24 ENCOUNTER — APPOINTMENT (OUTPATIENT)
Dept: ULTRASOUND IMAGING | Facility: IMAGING CENTER | Age: 59
End: 2024-04-24
Payer: MEDICAID

## 2024-04-24 DIAGNOSIS — Z98.890 OTHER SPECIFIED POSTPROCEDURAL STATES: Chronic | ICD-10-CM

## 2024-04-24 DIAGNOSIS — Z00.8 ENCOUNTER FOR OTHER GENERAL EXAMINATION: ICD-10-CM

## 2024-04-24 DIAGNOSIS — Z00.00 ENCOUNTER FOR GENERAL ADULT MEDICAL EXAMINATION WITHOUT ABNORMAL FINDINGS: ICD-10-CM

## 2024-04-24 PROCEDURE — 76536 US EXAM OF HEAD AND NECK: CPT

## 2024-04-24 PROCEDURE — 76536 US EXAM OF HEAD AND NECK: CPT | Mod: 26

## 2024-05-28 ENCOUNTER — APPOINTMENT (OUTPATIENT)
Dept: GASTROENTEROLOGY | Facility: CLINIC | Age: 59
End: 2024-05-28
Payer: MEDICAID

## 2024-05-28 VITALS
HEART RATE: 83 BPM | TEMPERATURE: 97.1 F | SYSTOLIC BLOOD PRESSURE: 137 MMHG | BODY MASS INDEX: 31.5 KG/M2 | DIASTOLIC BLOOD PRESSURE: 84 MMHG | HEIGHT: 66 IN | WEIGHT: 196 LBS | OXYGEN SATURATION: 98 %

## 2024-05-28 DIAGNOSIS — C50.919 MALIGNANT NEOPLASM OF UNSPECIFIED SITE OF UNSPECIFIED FEMALE BREAST: ICD-10-CM

## 2024-05-28 DIAGNOSIS — K63.5 POLYP OF COLON: ICD-10-CM

## 2024-05-28 PROCEDURE — 99213 OFFICE O/P EST LOW 20 MIN: CPT

## 2024-05-28 PROCEDURE — T1013: CPT

## 2024-05-28 RX ORDER — POLYETHYLENE GLYCOL 3350, SODIUM CHLORIDE, SODIUM BICARBONATE AND POTASSIUM CHLORIDE WITH LEMON FLAVOR 420; 11.2; 5.72; 1.48 G/4L; G/4L; G/4L; G/4L
420 POWDER, FOR SOLUTION ORAL
Qty: 4000 | Refills: 0 | Status: DISCONTINUED | COMMUNITY
Start: 2023-12-19 | End: 2024-05-28

## 2024-05-28 NOTE — HISTORY OF PRESENT ILLNESS
[FreeTextEntry1] :  Christine [id number] 59-year-old woman status post right lumpectomy for breast cancer who underwent a screening colonoscopy on April 17.  His small hyperplastic polyp was removed from the sigmoid colon.  She tolerated the procedure well.  She has no complaints.

## 2024-05-28 NOTE — ASSESSMENT
[FreeTextEntry1] : Colon cancer screening in this high-risk patient with a personal history of colon polyps.  Importance of surveillance colonoscopy was discussed with the patient.  She will return in 10 years for a follow-up colonoscopy.  She understands and all questions were answered.

## 2024-05-28 NOTE — REASON FOR VISIT
[Follow-up] : a follow-up of an existing diagnosis [[employer] ] : provided by [employer]   [Time Spent: ____ minutes] : Total time spent using  services: [unfilled] minutes. The patient's primary language is not English thus required  services. [Source: ______] : History obtained from [unfilled] [FreeTextEntry1] : colon polyp [Interpreters_IDNumber] : [id number] [Interpreters_FullName] : Christine [TWNoteComboBox1] : Singaporean

## 2024-06-03 ENCOUNTER — RX RENEWAL (OUTPATIENT)
Age: 59
End: 2024-06-03

## 2024-06-03 RX ORDER — METFORMIN HYDROCHLORIDE 1000 MG/1
1000 TABLET, COATED ORAL
Qty: 60 | Refills: 3 | Status: ACTIVE | COMMUNITY
Start: 2020-08-11 | End: 1900-01-01

## 2024-06-19 ENCOUNTER — RX RENEWAL (OUTPATIENT)
Age: 59
End: 2024-06-19

## 2024-06-19 RX ORDER — CHLORTHALIDONE 25 MG/1
25 TABLET ORAL
Qty: 45 | Refills: 3 | Status: ACTIVE | COMMUNITY
Start: 2022-06-07 | End: 1900-01-01

## 2024-06-26 ENCOUNTER — APPOINTMENT (OUTPATIENT)
Dept: OPHTHALMOLOGY | Facility: CLINIC | Age: 59
End: 2024-06-26

## 2024-07-02 ENCOUNTER — OUTPATIENT (OUTPATIENT)
Dept: OUTPATIENT SERVICES | Facility: HOSPITAL | Age: 59
LOS: 1 days | Discharge: HOME | End: 2024-07-02

## 2024-07-02 DIAGNOSIS — Z98.890 OTHER SPECIFIED POSTPROCEDURAL STATES: Chronic | ICD-10-CM

## 2024-07-02 DIAGNOSIS — C50.919 MALIGNANT NEOPLASM OF UNSPECIFIED SITE OF UNSPECIFIED FEMALE BREAST: ICD-10-CM

## 2024-07-08 ENCOUNTER — APPOINTMENT (OUTPATIENT)
Dept: HEMATOLOGY ONCOLOGY | Facility: CLINIC | Age: 59
End: 2024-07-08
Payer: MEDICAID

## 2024-07-08 VITALS
OXYGEN SATURATION: 96 % | RESPIRATION RATE: 19 BRPM | WEIGHT: 189.59 LBS | TEMPERATURE: 98.4 F | HEART RATE: 109 BPM | SYSTOLIC BLOOD PRESSURE: 150 MMHG | DIASTOLIC BLOOD PRESSURE: 83 MMHG | BODY MASS INDEX: 30.6 KG/M2

## 2024-07-08 DIAGNOSIS — R23.2 FLUSHING: ICD-10-CM

## 2024-07-08 DIAGNOSIS — Z79.811 LONG TERM (CURRENT) USE OF AROMATASE INHIBITORS: ICD-10-CM

## 2024-07-08 DIAGNOSIS — C50.919 MALIGNANT NEOPLASM OF UNSPECIFIED SITE OF UNSPECIFIED FEMALE BREAST: ICD-10-CM

## 2024-07-08 PROCEDURE — G2211 COMPLEX E/M VISIT ADD ON: CPT | Mod: NC,1L

## 2024-07-08 PROCEDURE — 99214 OFFICE O/P EST MOD 30 MIN: CPT

## 2024-08-22 ENCOUNTER — APPOINTMENT (OUTPATIENT)
Dept: INTERNAL MEDICINE | Facility: CLINIC | Age: 59
End: 2024-08-22
Payer: MEDICAID

## 2024-08-22 ENCOUNTER — OUTPATIENT (OUTPATIENT)
Dept: OUTPATIENT SERVICES | Facility: HOSPITAL | Age: 59
LOS: 1 days | End: 2024-08-22

## 2024-08-22 VITALS
SYSTOLIC BLOOD PRESSURE: 146 MMHG | HEIGHT: 66 IN | HEART RATE: 68 BPM | BODY MASS INDEX: 29.57 KG/M2 | WEIGHT: 184 LBS | OXYGEN SATURATION: 99 % | DIASTOLIC BLOOD PRESSURE: 86 MMHG

## 2024-08-22 DIAGNOSIS — E11.9 TYPE 2 DIABETES MELLITUS W/OUT COMPLICATIONS: ICD-10-CM

## 2024-08-22 DIAGNOSIS — Z00.00 ENCOUNTER FOR GENERAL ADULT MEDICAL EXAMINATION W/OUT ABNORMAL FINDINGS: ICD-10-CM

## 2024-08-22 DIAGNOSIS — E04.2 NONTOXIC MULTINODULAR GOITER: ICD-10-CM

## 2024-08-22 DIAGNOSIS — I10 ESSENTIAL (PRIMARY) HYPERTENSION: ICD-10-CM

## 2024-08-22 DIAGNOSIS — Z98.890 OTHER SPECIFIED POSTPROCEDURAL STATES: Chronic | ICD-10-CM

## 2024-08-22 PROCEDURE — 99213 OFFICE O/P EST LOW 20 MIN: CPT

## 2024-08-22 PROCEDURE — G2211 COMPLEX E/M VISIT ADD ON: CPT | Mod: NC

## 2024-08-22 RX ORDER — ATORVASTATIN CALCIUM 20 MG/1
20 TABLET, FILM COATED ORAL
Qty: 90 | Refills: 3 | Status: ACTIVE | COMMUNITY
Start: 2024-08-22 | End: 1900-01-01

## 2024-08-22 RX ORDER — AMLODIPINE BESYLATE 5 MG/1
5 TABLET ORAL DAILY
Qty: 30 | Refills: 3 | Status: ACTIVE | COMMUNITY
Start: 2024-08-22 | End: 1900-01-01

## 2024-08-23 LAB
ANION GAP SERPL CALC-SCNC: 14 MMOL/L
BUN SERPL-MCNC: 12 MG/DL
CALCIUM SERPL-MCNC: 10.2 MG/DL
CHLORIDE SERPL-SCNC: 102 MMOL/L
CHOLEST SERPL-MCNC: 176 MG/DL
CO2 SERPL-SCNC: 26 MMOL/L
CREAT SERPL-MCNC: 0.62 MG/DL
EGFR: 103 ML/MIN/1.73M2
ESTIMATED AVERAGE GLUCOSE: 160 MG/DL
GLUCOSE SERPL-MCNC: 119 MG/DL
HBA1C MFR BLD HPLC: 7.2 %
HCT VFR BLD CALC: 46.1 %
HDLC SERPL-MCNC: 63 MG/DL
HGB BLD-MCNC: 15.3 G/DL
LDLC SERPL CALC-MCNC: 97 MG/DL
MCHC RBC-ENTMCNC: 27.7 PG
MCHC RBC-ENTMCNC: 33.2 GM/DL
MCV RBC AUTO: 83.5 FL
NONHDLC SERPL-MCNC: 113 MG/DL
PLATELET # BLD AUTO: 360 K/UL
POTASSIUM SERPL-SCNC: 4.3 MMOL/L
RBC # BLD: 5.52 M/UL
RBC # FLD: 12.2 %
SODIUM SERPL-SCNC: 142 MMOL/L
TRIGL SERPL-MCNC: 86 MG/DL
TSH SERPL-ACNC: 0.95 UIU/ML
WBC # FLD AUTO: 8.47 K/UL

## 2024-08-23 NOTE — HISTORY OF PRESENT ILLNESS
[FreeTextEntry1] : follow up  [de-identified] : Patient is a 58 year-old female with HTN, DM, GERD, multinodular goiter, hepatic steatosis, DCIS of R. Breast s/p lumpectomy/radiation, and now on anastrazole daily presenting for a follow up visit  #HTN her home BP has been above 130s, and denied any headache, or dizziness.  #DM Her fasting BG has been around 120s, only on metformin. She denied any polyuria, polydipsia, abd pain, n/v.  #HTN her bp at home is more than 130s,  denied headache, dizziness. she takes her home meds consistently, she can do her normal daily activities without any sob.  Insomnia  hasn't been able to sleep well which is chronic and tried vitamins and melatonin

## 2024-08-23 NOTE — INTERPRETER SERVICES
[[employer] ] : provided by [employer]   [Time Spent: ____ minutes] : Total time spent using  services: [unfilled] minutes. The patient's primary language is not English thus required  services. [Interpreters_IDNumber] : [id number] [Interpreters_FullName] : Yelitza

## 2024-08-26 DIAGNOSIS — I10 ESSENTIAL (PRIMARY) HYPERTENSION: ICD-10-CM

## 2024-08-26 DIAGNOSIS — E11.9 TYPE 2 DIABETES MELLITUS WITHOUT COMPLICATIONS: ICD-10-CM

## 2024-08-26 DIAGNOSIS — E04.2 NONTOXIC MULTINODULAR GOITER: ICD-10-CM

## 2024-09-09 ENCOUNTER — RX RENEWAL (OUTPATIENT)
Age: 59
End: 2024-09-09

## 2024-09-14 NOTE — ASU PREOPERATIVE ASSESSMENT, ADULT ([facility]ARK ONLY) - INV PERIPH IV DEVICE
S/p 9/10 lap converted to open LAR, primary bladder repair.    Plan:  - lo-res diet  - restart baseline Lasix   - appreciate analilia recs  - DVT ppx   Angiocath

## 2024-10-17 ENCOUNTER — RX RENEWAL (OUTPATIENT)
Age: 59
End: 2024-10-17

## 2024-11-05 ENCOUNTER — NON-APPOINTMENT (OUTPATIENT)
Age: 59
End: 2024-11-05

## 2024-11-13 ENCOUNTER — NON-APPOINTMENT (OUTPATIENT)
Age: 59
End: 2024-11-13

## 2024-11-13 ENCOUNTER — APPOINTMENT (OUTPATIENT)
Dept: SURGICAL ONCOLOGY | Facility: CLINIC | Age: 59
End: 2024-11-13
Payer: MEDICAID

## 2024-11-13 VITALS
HEART RATE: 76 BPM | BODY MASS INDEX: 30.53 KG/M2 | SYSTOLIC BLOOD PRESSURE: 169 MMHG | WEIGHT: 190 LBS | HEIGHT: 66 IN | RESPIRATION RATE: 18 BRPM | OXYGEN SATURATION: 96 % | DIASTOLIC BLOOD PRESSURE: 90 MMHG

## 2024-11-13 DIAGNOSIS — C50.919 MALIGNANT NEOPLASM OF UNSPECIFIED SITE OF UNSPECIFIED FEMALE BREAST: ICD-10-CM

## 2024-11-13 PROCEDURE — T1013A: CUSTOM

## 2024-11-13 PROCEDURE — 99214 OFFICE O/P EST MOD 30 MIN: CPT

## 2024-12-02 ENCOUNTER — APPOINTMENT (OUTPATIENT)
Dept: INTERNAL MEDICINE | Facility: CLINIC | Age: 59
End: 2024-12-02

## 2024-12-30 ENCOUNTER — RX RENEWAL (OUTPATIENT)
Age: 59
End: 2024-12-30

## 2025-01-02 ENCOUNTER — OUTPATIENT (OUTPATIENT)
Dept: OUTPATIENT SERVICES | Facility: HOSPITAL | Age: 60
LOS: 1 days | Discharge: HOME | End: 2025-01-02

## 2025-01-02 DIAGNOSIS — C50.919 MALIGNANT NEOPLASM OF UNSPECIFIED SITE OF UNSPECIFIED FEMALE BREAST: ICD-10-CM

## 2025-01-02 DIAGNOSIS — Z98.890 OTHER SPECIFIED POSTPROCEDURAL STATES: Chronic | ICD-10-CM

## 2025-01-08 ENCOUNTER — APPOINTMENT (OUTPATIENT)
Dept: HEMATOLOGY ONCOLOGY | Facility: CLINIC | Age: 60
End: 2025-01-08

## 2025-01-10 ENCOUNTER — APPOINTMENT (OUTPATIENT)
Dept: HEMATOLOGY ONCOLOGY | Facility: CLINIC | Age: 60
End: 2025-01-10
Payer: MEDICAID

## 2025-01-10 ENCOUNTER — NON-APPOINTMENT (OUTPATIENT)
Age: 60
End: 2025-01-10

## 2025-01-10 VITALS
HEIGHT: 65.75 IN | WEIGHT: 191.8 LBS | DIASTOLIC BLOOD PRESSURE: 82 MMHG | SYSTOLIC BLOOD PRESSURE: 124 MMHG | RESPIRATION RATE: 19 BRPM | OXYGEN SATURATION: 96 % | TEMPERATURE: 97.7 F | BODY MASS INDEX: 31.2 KG/M2 | HEART RATE: 85 BPM

## 2025-01-10 DIAGNOSIS — C50.919 MALIGNANT NEOPLASM OF UNSPECIFIED SITE OF UNSPECIFIED FEMALE BREAST: ICD-10-CM

## 2025-01-10 PROCEDURE — G2211 COMPLEX E/M VISIT ADD ON: CPT | Mod: NC

## 2025-01-10 PROCEDURE — 99214 OFFICE O/P EST MOD 30 MIN: CPT

## 2025-01-30 ENCOUNTER — APPOINTMENT (OUTPATIENT)
Dept: OBGYN | Facility: HOSPITAL | Age: 60
End: 2025-01-30

## 2025-02-11 ENCOUNTER — MED ADMIN CHARGE (OUTPATIENT)
Age: 60
End: 2025-02-11

## 2025-02-11 ENCOUNTER — OUTPATIENT (OUTPATIENT)
Dept: OUTPATIENT SERVICES | Facility: HOSPITAL | Age: 60
LOS: 1 days | End: 2025-02-11

## 2025-02-11 ENCOUNTER — APPOINTMENT (OUTPATIENT)
Dept: INTERNAL MEDICINE | Facility: CLINIC | Age: 60
End: 2025-02-11
Payer: MEDICAID

## 2025-02-11 ENCOUNTER — RESULT CHARGE (OUTPATIENT)
Age: 60
End: 2025-02-11

## 2025-02-11 VITALS — BODY MASS INDEX: 31.82 KG/M2 | HEIGHT: 65 IN | WEIGHT: 191 LBS

## 2025-02-11 DIAGNOSIS — E11.9 TYPE 2 DIABETES MELLITUS W/OUT COMPLICATIONS: ICD-10-CM

## 2025-02-11 DIAGNOSIS — I10 ESSENTIAL (PRIMARY) HYPERTENSION: ICD-10-CM

## 2025-02-11 DIAGNOSIS — C50.919 MALIGNANT NEOPLASM OF UNSPECIFIED SITE OF UNSPECIFIED FEMALE BREAST: ICD-10-CM

## 2025-02-11 DIAGNOSIS — E04.2 NONTOXIC MULTINODULAR GOITER: ICD-10-CM

## 2025-02-11 DIAGNOSIS — Z00.00 ENCOUNTER FOR GENERAL ADULT MEDICAL EXAMINATION W/OUT ABNORMAL FINDINGS: ICD-10-CM

## 2025-02-11 PROCEDURE — G0444 DEPRESSION SCREEN ANNUAL: CPT | Mod: 59

## 2025-02-11 PROCEDURE — 99386 PREV VISIT NEW AGE 40-64: CPT

## 2025-02-11 RX ORDER — EMPAGLIFLOZIN 10 MG/1
10 TABLET, FILM COATED ORAL
Qty: 30 | Refills: 3 | Status: ACTIVE | COMMUNITY
Start: 2025-02-11 | End: 1900-01-01

## 2025-02-12 DIAGNOSIS — Z00.00 ENCOUNTER FOR GENERAL ADULT MEDICAL EXAMINATION WITHOUT ABNORMAL FINDINGS: ICD-10-CM

## 2025-02-12 DIAGNOSIS — E04.2 NONTOXIC MULTINODULAR GOITER: ICD-10-CM

## 2025-02-12 DIAGNOSIS — Z13.31 ENCOUNTER FOR SCREENING FOR DEPRESSION: ICD-10-CM

## 2025-02-12 DIAGNOSIS — I10 ESSENTIAL (PRIMARY) HYPERTENSION: ICD-10-CM

## 2025-02-12 DIAGNOSIS — E11.9 TYPE 2 DIABETES MELLITUS WITHOUT COMPLICATIONS: ICD-10-CM

## 2025-02-12 LAB
ALBUMIN SERPL ELPH-MCNC: 4.7 G/DL
ALP BLD-CCNC: 73 U/L
ALT SERPL-CCNC: 32 U/L
ANION GAP SERPL CALC-SCNC: 17 MMOL/L
AST SERPL-CCNC: 26 U/L
BILIRUB SERPL-MCNC: 0.2 MG/DL
BUN SERPL-MCNC: 10 MG/DL
CALCIUM SERPL-MCNC: 9.8 MG/DL
CHLORIDE SERPL-SCNC: 101 MMOL/L
CHOLEST SERPL-MCNC: 163 MG/DL
CO2 SERPL-SCNC: 24 MMOL/L
CREAT SERPL-MCNC: 0.66 MG/DL
CREAT SPEC-SCNC: 53 MG/DL
EGFR: 101 ML/MIN/1.73M2
ESTIMATED AVERAGE GLUCOSE: 171 MG/DL
GLUCOSE SERPL-MCNC: 101 MG/DL
HBA1C MFR BLD HPLC: 7.5
HBA1C MFR BLD HPLC: 7.6 %
HCT VFR BLD CALC: 44.2 %
HDLC SERPL-MCNC: 60 MG/DL
HGB BLD-MCNC: 14.2 G/DL
LDLC SERPL CALC-MCNC: 88 MG/DL
MCHC RBC-ENTMCNC: 27.8 PG
MCHC RBC-ENTMCNC: 32.1 G/DL
MCV RBC AUTO: 86.7 FL
MICROALBUMIN 24H UR DL<=1MG/L-MCNC: 5.1 MG/DL
MICROALBUMIN/CREAT 24H UR-RTO: 97 MG/G
NONHDLC SERPL-MCNC: 103 MG/DL
PLATELET # BLD AUTO: 351 K/UL
POTASSIUM SERPL-SCNC: 4.5 MMOL/L
PROT SERPL-MCNC: 7.8 G/DL
RBC # BLD: 5.1 M/UL
RBC # FLD: 12.1 %
SODIUM SERPL-SCNC: 142 MMOL/L
TRIGL SERPL-MCNC: 82 MG/DL
TSH SERPL-ACNC: 1.17 UIU/ML
WBC # FLD AUTO: 9.15 K/UL

## 2025-02-19 ENCOUNTER — RX RENEWAL (OUTPATIENT)
Age: 60
End: 2025-02-19

## 2025-02-20 DIAGNOSIS — Z23 ENCOUNTER FOR IMMUNIZATION: ICD-10-CM

## 2025-04-25 ENCOUNTER — OUTPATIENT (OUTPATIENT)
Dept: OUTPATIENT SERVICES | Facility: HOSPITAL | Age: 60
LOS: 1 days | End: 2025-04-25
Payer: MEDICAID

## 2025-04-25 ENCOUNTER — APPOINTMENT (OUTPATIENT)
Dept: ULTRASOUND IMAGING | Facility: IMAGING CENTER | Age: 60
End: 2025-04-25
Payer: MEDICAID

## 2025-04-25 ENCOUNTER — RESULT REVIEW (OUTPATIENT)
Age: 60
End: 2025-04-25

## 2025-04-25 ENCOUNTER — APPOINTMENT (OUTPATIENT)
Dept: MAMMOGRAPHY | Facility: IMAGING CENTER | Age: 60
End: 2025-04-25
Payer: MEDICAID

## 2025-04-25 DIAGNOSIS — C50.919 MALIGNANT NEOPLASM OF UNSPECIFIED SITE OF UNSPECIFIED FEMALE BREAST: ICD-10-CM

## 2025-04-25 DIAGNOSIS — Z98.890 OTHER SPECIFIED POSTPROCEDURAL STATES: Chronic | ICD-10-CM

## 2025-04-25 PROCEDURE — 77066 DX MAMMO INCL CAD BI: CPT | Mod: 26

## 2025-04-25 PROCEDURE — 76641 ULTRASOUND BREAST COMPLETE: CPT | Mod: 26,50

## 2025-04-25 PROCEDURE — 76641 ULTRASOUND BREAST COMPLETE: CPT | Mod: 26,LT

## 2025-04-25 PROCEDURE — 77062 BREAST TOMOSYNTHESIS BI: CPT | Mod: 26

## 2025-04-25 PROCEDURE — 77066 DX MAMMO INCL CAD BI: CPT

## 2025-04-25 PROCEDURE — G0279: CPT

## 2025-04-25 PROCEDURE — 76641 ULTRASOUND BREAST COMPLETE: CPT

## 2025-05-27 ENCOUNTER — APPOINTMENT (OUTPATIENT)
Dept: INTERNAL MEDICINE | Facility: CLINIC | Age: 60
End: 2025-05-27
Payer: MEDICAID

## 2025-05-27 ENCOUNTER — OUTPATIENT (OUTPATIENT)
Dept: OUTPATIENT SERVICES | Facility: HOSPITAL | Age: 60
LOS: 1 days | End: 2025-05-27

## 2025-05-27 ENCOUNTER — RESULT CHARGE (OUTPATIENT)
Age: 60
End: 2025-05-27

## 2025-05-27 VITALS
RESPIRATION RATE: 16 BRPM | SYSTOLIC BLOOD PRESSURE: 130 MMHG | HEIGHT: 65 IN | DIASTOLIC BLOOD PRESSURE: 72 MMHG | OXYGEN SATURATION: 98 % | BODY MASS INDEX: 29.82 KG/M2 | HEART RATE: 81 BPM | WEIGHT: 179 LBS

## 2025-05-27 DIAGNOSIS — Z98.890 OTHER SPECIFIED POSTPROCEDURAL STATES: Chronic | ICD-10-CM

## 2025-05-27 DIAGNOSIS — I10 ESSENTIAL (PRIMARY) HYPERTENSION: ICD-10-CM

## 2025-05-27 DIAGNOSIS — E11.9 TYPE 2 DIABETES MELLITUS W/OUT COMPLICATIONS: ICD-10-CM

## 2025-05-27 DIAGNOSIS — E55.9 VITAMIN D DEFICIENCY, UNSPECIFIED: ICD-10-CM

## 2025-05-27 DIAGNOSIS — M79.606 PAIN IN LEG, UNSPECIFIED: ICD-10-CM

## 2025-05-27 LAB — HBA1C MFR BLD HPLC: 7.1

## 2025-05-27 PROCEDURE — 99213 OFFICE O/P EST LOW 20 MIN: CPT

## 2025-05-27 PROCEDURE — G2211 COMPLEX E/M VISIT ADD ON: CPT | Mod: NC

## 2025-06-02 DIAGNOSIS — E55.9 VITAMIN D DEFICIENCY, UNSPECIFIED: ICD-10-CM

## 2025-06-02 DIAGNOSIS — M79.606 PAIN IN LEG, UNSPECIFIED: ICD-10-CM

## 2025-06-02 DIAGNOSIS — I10 ESSENTIAL (PRIMARY) HYPERTENSION: ICD-10-CM

## 2025-06-02 DIAGNOSIS — E11.9 TYPE 2 DIABETES MELLITUS WITHOUT COMPLICATIONS: ICD-10-CM

## 2025-07-10 ENCOUNTER — OUTPATIENT (OUTPATIENT)
Dept: OUTPATIENT SERVICES | Facility: HOSPITAL | Age: 60
LOS: 1 days | Discharge: HOME | End: 2025-07-10

## 2025-07-10 DIAGNOSIS — Z98.890 OTHER SPECIFIED POSTPROCEDURAL STATES: Chronic | ICD-10-CM

## 2025-07-10 DIAGNOSIS — C50.919 MALIGNANT NEOPLASM OF UNSPECIFIED SITE OF UNSPECIFIED FEMALE BREAST: ICD-10-CM

## 2025-07-11 ENCOUNTER — APPOINTMENT (OUTPATIENT)
Dept: HEMATOLOGY ONCOLOGY | Facility: CLINIC | Age: 60
End: 2025-07-11
Payer: MEDICAID

## 2025-07-11 ENCOUNTER — OUTPATIENT (OUTPATIENT)
Dept: OUTPATIENT SERVICES | Facility: HOSPITAL | Age: 60
LOS: 1 days | End: 2025-07-11

## 2025-07-11 ENCOUNTER — APPOINTMENT (OUTPATIENT)
Dept: RADIOLOGY | Facility: IMAGING CENTER | Age: 60
End: 2025-07-11

## 2025-07-11 VITALS
HEART RATE: 75 BPM | OXYGEN SATURATION: 98 % | SYSTOLIC BLOOD PRESSURE: 131 MMHG | WEIGHT: 178.45 LBS | BODY MASS INDEX: 30.09 KG/M2 | RESPIRATION RATE: 14 BRPM | HEIGHT: 64.41 IN | DIASTOLIC BLOOD PRESSURE: 80 MMHG

## 2025-07-11 DIAGNOSIS — Z98.890 OTHER SPECIFIED POSTPROCEDURAL STATES: Chronic | ICD-10-CM

## 2025-07-11 DIAGNOSIS — Z00.8 ENCOUNTER FOR OTHER GENERAL EXAMINATION: ICD-10-CM

## 2025-07-11 PROCEDURE — 99214 OFFICE O/P EST MOD 30 MIN: CPT

## 2025-07-11 PROCEDURE — G2211 COMPLEX E/M VISIT ADD ON: CPT | Mod: NC

## 2025-07-30 ENCOUNTER — EMERGENCY (EMERGENCY)
Facility: HOSPITAL | Age: 60
LOS: 1 days | End: 2025-07-30
Attending: STUDENT IN AN ORGANIZED HEALTH CARE EDUCATION/TRAINING PROGRAM | Admitting: STUDENT IN AN ORGANIZED HEALTH CARE EDUCATION/TRAINING PROGRAM
Payer: MEDICAID

## 2025-07-30 VITALS
WEIGHT: 154.1 LBS | SYSTOLIC BLOOD PRESSURE: 164 MMHG | DIASTOLIC BLOOD PRESSURE: 83 MMHG | HEART RATE: 105 BPM | OXYGEN SATURATION: 100 % | RESPIRATION RATE: 16 BRPM | HEIGHT: 64.41 IN | TEMPERATURE: 100 F

## 2025-07-30 DIAGNOSIS — Z98.890 OTHER SPECIFIED POSTPROCEDURAL STATES: Chronic | ICD-10-CM

## 2025-07-30 PROCEDURE — 99284 EMERGENCY DEPT VISIT MOD MDM: CPT

## 2025-07-31 VITALS
HEART RATE: 78 BPM | SYSTOLIC BLOOD PRESSURE: 129 MMHG | OXYGEN SATURATION: 99 % | DIASTOLIC BLOOD PRESSURE: 61 MMHG | TEMPERATURE: 99 F | RESPIRATION RATE: 17 BRPM

## 2025-07-31 LAB
ALBUMIN SERPL ELPH-MCNC: 4.4 G/DL — SIGNIFICANT CHANGE UP (ref 3.3–5)
ALP SERPL-CCNC: 73 U/L — SIGNIFICANT CHANGE UP (ref 40–120)
ALT FLD-CCNC: 35 U/L — HIGH (ref 4–33)
ANION GAP SERPL CALC-SCNC: 14 MMOL/L — SIGNIFICANT CHANGE UP (ref 7–14)
APPEARANCE UR: CLEAR — SIGNIFICANT CHANGE UP
AST SERPL-CCNC: 31 U/L — SIGNIFICANT CHANGE UP (ref 4–32)
BACTERIA # UR AUTO: ABNORMAL /HPF
BASOPHILS # BLD AUTO: 0.06 K/UL — SIGNIFICANT CHANGE UP (ref 0–0.2)
BASOPHILS NFR BLD AUTO: 0.5 % — SIGNIFICANT CHANGE UP (ref 0–2)
BILIRUB SERPL-MCNC: 0.3 MG/DL — SIGNIFICANT CHANGE UP (ref 0.2–1.2)
BILIRUB UR-MCNC: NEGATIVE — SIGNIFICANT CHANGE UP
BUN SERPL-MCNC: 12 MG/DL — SIGNIFICANT CHANGE UP (ref 7–23)
CALCIUM SERPL-MCNC: 10 MG/DL — SIGNIFICANT CHANGE UP (ref 8.4–10.5)
CAST: 0 /LPF — SIGNIFICANT CHANGE UP (ref 0–4)
CHLORIDE SERPL-SCNC: 101 MMOL/L — SIGNIFICANT CHANGE UP (ref 98–107)
CO2 SERPL-SCNC: 23 MMOL/L — SIGNIFICANT CHANGE UP (ref 22–31)
COLOR SPEC: ABNORMAL
CREAT SERPL-MCNC: 0.68 MG/DL — SIGNIFICANT CHANGE UP (ref 0.5–1.3)
DIFF PNL FLD: ABNORMAL
EGFR: 100 ML/MIN/1.73M2 — SIGNIFICANT CHANGE UP
EGFR: 100 ML/MIN/1.73M2 — SIGNIFICANT CHANGE UP
EOSINOPHIL # BLD AUTO: 0.22 K/UL — SIGNIFICANT CHANGE UP (ref 0–0.5)
EOSINOPHIL NFR BLD AUTO: 1.7 % — SIGNIFICANT CHANGE UP (ref 0–6)
GLUCOSE SERPL-MCNC: 113 MG/DL — HIGH (ref 70–99)
GLUCOSE UR QL: >=1000 MG/DL
HCT VFR BLD CALC: 42.6 % — SIGNIFICANT CHANGE UP (ref 34.5–45)
HGB BLD-MCNC: 14.2 G/DL — SIGNIFICANT CHANGE UP (ref 11.5–15.5)
IMM GRANULOCYTES # BLD AUTO: 0.05 K/UL — SIGNIFICANT CHANGE UP (ref 0–0.07)
IMM GRANULOCYTES NFR BLD AUTO: 0.4 % — SIGNIFICANT CHANGE UP (ref 0–0.9)
KETONES UR QL: NEGATIVE MG/DL — SIGNIFICANT CHANGE UP
LEUKOCYTE ESTERASE UR-ACNC: ABNORMAL
LYMPHOCYTES # BLD AUTO: 2.51 K/UL — SIGNIFICANT CHANGE UP (ref 1–3.3)
LYMPHOCYTES NFR BLD AUTO: 19.7 % — SIGNIFICANT CHANGE UP (ref 13–44)
MCHC RBC-ENTMCNC: 27.5 PG — SIGNIFICANT CHANGE UP (ref 27–34)
MCHC RBC-ENTMCNC: 33.3 G/DL — SIGNIFICANT CHANGE UP (ref 32–36)
MCV RBC AUTO: 82.4 FL — SIGNIFICANT CHANGE UP (ref 80–100)
MONOCYTES # BLD AUTO: 0.84 K/UL — SIGNIFICANT CHANGE UP (ref 0–0.9)
MONOCYTES NFR BLD AUTO: 6.6 % — SIGNIFICANT CHANGE UP (ref 2–14)
NEUTROPHILS # BLD AUTO: 9.08 K/UL — HIGH (ref 1.8–7.4)
NEUTROPHILS NFR BLD AUTO: 71.1 % — SIGNIFICANT CHANGE UP (ref 43–77)
NITRITE UR-MCNC: NEGATIVE — SIGNIFICANT CHANGE UP
NRBC # BLD AUTO: 0 K/UL — SIGNIFICANT CHANGE UP (ref 0–0)
NRBC # FLD: 0 K/UL — SIGNIFICANT CHANGE UP (ref 0–0)
NRBC BLD AUTO-RTO: 0 /100 WBCS — SIGNIFICANT CHANGE UP (ref 0–0)
PH UR: 6.5 — SIGNIFICANT CHANGE UP (ref 5–8)
PLATELET # BLD AUTO: 330 K/UL — SIGNIFICANT CHANGE UP (ref 150–400)
PMV BLD: 9.8 FL — SIGNIFICANT CHANGE UP (ref 7–13)
POTASSIUM SERPL-MCNC: 3.6 MMOL/L — SIGNIFICANT CHANGE UP (ref 3.5–5.3)
POTASSIUM SERPL-SCNC: 3.6 MMOL/L — SIGNIFICANT CHANGE UP (ref 3.5–5.3)
PROT SERPL-MCNC: 8.2 G/DL — SIGNIFICANT CHANGE UP (ref 6–8.3)
PROT UR-MCNC: SIGNIFICANT CHANGE UP MG/DL
RBC # BLD: 5.17 M/UL — SIGNIFICANT CHANGE UP (ref 3.8–5.2)
RBC # FLD: 11.9 % — SIGNIFICANT CHANGE UP (ref 10.3–14.5)
RBC CASTS # UR COMP ASSIST: >50 /HPF — SIGNIFICANT CHANGE UP (ref 0–4)
SODIUM SERPL-SCNC: 138 MMOL/L — SIGNIFICANT CHANGE UP (ref 135–145)
SP GR SPEC: 1.01 — SIGNIFICANT CHANGE UP (ref 1–1.03)
SQUAMOUS # UR AUTO: 0 /HPF — SIGNIFICANT CHANGE UP (ref 0–5)
UROBILINOGEN FLD QL: 0.2 MG/DL — SIGNIFICANT CHANGE UP (ref 0.2–1)
WBC # BLD: 12.76 K/UL — HIGH (ref 3.8–10.5)
WBC # FLD AUTO: 12.76 K/UL — HIGH (ref 3.8–10.5)
WBC UR QL: 61 /HPF — HIGH (ref 0–5)

## 2025-07-31 PROCEDURE — 74176 CT ABD & PELVIS W/O CONTRAST: CPT | Mod: 26

## 2025-07-31 RX ORDER — CEFPODOXIME PROXETIL 200 MG/1
1 TABLET, FILM COATED ORAL
Qty: 28 | Refills: 0
Start: 2025-07-31 | End: 2025-08-13

## 2025-07-31 RX ORDER — CEFTRIAXONE 500 MG/1
1000 INJECTION, POWDER, FOR SOLUTION INTRAMUSCULAR; INTRAVENOUS ONCE
Refills: 0 | Status: COMPLETED | OUTPATIENT
Start: 2025-07-31 | End: 2025-07-31

## 2025-07-31 RX ORDER — KETOROLAC TROMETHAMINE 30 MG/ML
15 INJECTION, SOLUTION INTRAMUSCULAR; INTRAVENOUS ONCE
Refills: 0 | Status: DISCONTINUED | OUTPATIENT
Start: 2025-07-31 | End: 2025-07-31

## 2025-07-31 RX ADMIN — Medication 1000 MILLILITER(S): at 00:59

## 2025-07-31 RX ADMIN — KETOROLAC TROMETHAMINE 15 MILLIGRAM(S): 30 INJECTION, SOLUTION INTRAMUSCULAR; INTRAVENOUS at 01:53

## 2025-07-31 RX ADMIN — KETOROLAC TROMETHAMINE 15 MILLIGRAM(S): 30 INJECTION, SOLUTION INTRAMUSCULAR; INTRAVENOUS at 00:53

## 2025-07-31 RX ADMIN — CEFTRIAXONE 100 MILLIGRAM(S): 500 INJECTION, POWDER, FOR SOLUTION INTRAMUSCULAR; INTRAVENOUS at 02:18

## 2025-08-02 LAB
-  AMOXICILLIN/CLAVULANIC ACID: SIGNIFICANT CHANGE UP
-  AMPICILLIN/SULBACTAM: SIGNIFICANT CHANGE UP
-  AMPICILLIN: SIGNIFICANT CHANGE UP
-  AZTREONAM: SIGNIFICANT CHANGE UP
-  CEFAZOLIN: SIGNIFICANT CHANGE UP
-  CEFEPIME: SIGNIFICANT CHANGE UP
-  CEFOXITIN: SIGNIFICANT CHANGE UP
-  CEFTRIAXONE: SIGNIFICANT CHANGE UP
-  CEFUROXIME: SIGNIFICANT CHANGE UP
-  CIPROFLOXACIN: SIGNIFICANT CHANGE UP
-  ERTAPENEM: SIGNIFICANT CHANGE UP
-  GENTAMICIN: SIGNIFICANT CHANGE UP
-  IMIPENEM: SIGNIFICANT CHANGE UP
-  LEVOFLOXACIN: SIGNIFICANT CHANGE UP
-  MEROPENEM: SIGNIFICANT CHANGE UP
-  NITROFURANTOIN: SIGNIFICANT CHANGE UP
-  PIPERACILLIN/TAZOBACTAM: SIGNIFICANT CHANGE UP
-  TIGECYCLINE: SIGNIFICANT CHANGE UP
-  TOBRAMYCIN: SIGNIFICANT CHANGE UP
-  TRIMETHOPRIM/SULFAMETHOXAZOLE: SIGNIFICANT CHANGE UP
CULTURE RESULTS: ABNORMAL
METHOD TYPE: SIGNIFICANT CHANGE UP
ORGANISM # SPEC MICROSCOPIC CNT: ABNORMAL
ORGANISM # SPEC MICROSCOPIC CNT: ABNORMAL
SPECIMEN SOURCE: SIGNIFICANT CHANGE UP

## 2025-08-02 RX ORDER — CEFPODOXIME PROXETIL 200 MG/1
200 TABLET, FILM COATED ORAL TWICE DAILY
Qty: 28 | Refills: 0 | Status: ACTIVE | COMMUNITY
Start: 2025-08-02 | End: 1900-01-01

## 2025-08-04 ENCOUNTER — OUTPATIENT (OUTPATIENT)
Dept: OUTPATIENT SERVICES | Facility: HOSPITAL | Age: 60
LOS: 1 days | End: 2025-08-04

## 2025-08-04 ENCOUNTER — RESULT CHARGE (OUTPATIENT)
Age: 60
End: 2025-08-04

## 2025-08-04 ENCOUNTER — APPOINTMENT (OUTPATIENT)
Dept: INTERNAL MEDICINE | Facility: CLINIC | Age: 60
End: 2025-08-04
Payer: MEDICAID

## 2025-08-04 VITALS
WEIGHT: 178 LBS | DIASTOLIC BLOOD PRESSURE: 78 MMHG | OXYGEN SATURATION: 98 % | HEART RATE: 62 BPM | BODY MASS INDEX: 30.02 KG/M2 | HEIGHT: 64.41 IN | SYSTOLIC BLOOD PRESSURE: 143 MMHG

## 2025-08-04 DIAGNOSIS — Z98.890 OTHER SPECIFIED POSTPROCEDURAL STATES: Chronic | ICD-10-CM

## 2025-08-04 DIAGNOSIS — R31.9 URINARY TRACT INFECTION, SITE NOT SPECIFIED: ICD-10-CM

## 2025-08-04 DIAGNOSIS — I10 ESSENTIAL (PRIMARY) HYPERTENSION: ICD-10-CM

## 2025-08-04 DIAGNOSIS — E11.9 TYPE 2 DIABETES MELLITUS W/OUT COMPLICATIONS: ICD-10-CM

## 2025-08-04 DIAGNOSIS — N39.0 URINARY TRACT INFECTION, SITE NOT SPECIFIED: ICD-10-CM

## 2025-08-04 PROCEDURE — 99214 OFFICE O/P EST MOD 30 MIN: CPT | Mod: GC

## 2025-08-04 PROCEDURE — G2211 COMPLEX E/M VISIT ADD ON: CPT | Mod: NC

## 2025-08-05 DIAGNOSIS — N39.0 URINARY TRACT INFECTION, SITE NOT SPECIFIED: ICD-10-CM

## 2025-08-05 DIAGNOSIS — E11.9 TYPE 2 DIABETES MELLITUS WITHOUT COMPLICATIONS: ICD-10-CM

## 2025-08-05 DIAGNOSIS — I10 ESSENTIAL (PRIMARY) HYPERTENSION: ICD-10-CM

## 2025-08-06 LAB — HBA1C MFR BLD HPLC: 6.9

## 2025-08-15 ENCOUNTER — APPOINTMENT (OUTPATIENT)
Dept: UROLOGY | Facility: CLINIC | Age: 60
End: 2025-08-15
Payer: MEDICAID

## 2025-08-15 ENCOUNTER — NON-APPOINTMENT (OUTPATIENT)
Age: 60
End: 2025-08-15

## 2025-08-15 VITALS
TEMPERATURE: 97.6 F | SYSTOLIC BLOOD PRESSURE: 121 MMHG | WEIGHT: 181 LBS | DIASTOLIC BLOOD PRESSURE: 81 MMHG | HEART RATE: 89 BPM | BODY MASS INDEX: 30.9 KG/M2 | RESPIRATION RATE: 17 BRPM | HEIGHT: 64 IN

## 2025-08-15 DIAGNOSIS — Z87.442 PERSONAL HISTORY OF URINARY CALCULI: ICD-10-CM

## 2025-08-15 DIAGNOSIS — N28.89 OTHER SPECIFIED DISORDERS OF KIDNEY AND URETER: ICD-10-CM

## 2025-08-15 DIAGNOSIS — R93.41 ABNORMAL RADIOLOGIC FINDINGS ON DIAGNOSTIC IMAGING OF OF RENAL PELVIS, URETER, OR BLADDER: ICD-10-CM

## 2025-08-15 DIAGNOSIS — R10.9 UNSPECIFIED ABDOMINAL PAIN: ICD-10-CM

## 2025-08-15 DIAGNOSIS — N20.0 CALCULUS OF KIDNEY: ICD-10-CM

## 2025-08-15 PROCEDURE — 99204 OFFICE O/P NEW MOD 45 MIN: CPT

## 2025-08-19 LAB
APPEARANCE: CLEAR
BILIRUBIN URINE: NEGATIVE
BLOOD URINE: NEGATIVE
COLOR: YELLOW
GLUCOSE QUALITATIVE U: >=1000 MG/DL
KETONES URINE: NEGATIVE MG/DL
LEUKOCYTE ESTERASE URINE: NEGATIVE
NITRITE URINE: NEGATIVE
PH URINE: 6.5
PROTEIN URINE: NEGATIVE MG/DL
SPECIFIC GRAVITY URINE: 1.01
UROBILINOGEN URINE: 0.2 MG/DL

## 2025-08-27 ENCOUNTER — RX CHANGE (OUTPATIENT)
Age: 60
End: 2025-08-27

## 2025-08-27 RX ORDER — METOPROLOL TARTRATE 50 MG/1
50 TABLET ORAL
Qty: 180 | Refills: 2 | Status: ACTIVE | COMMUNITY
Start: 1900-01-01 | End: 1900-01-01

## 2025-08-27 RX ORDER — METFORMIN HYDROCHLORIDE 1000 MG/1
1000 TABLET, COATED ORAL
Qty: 180 | Refills: 2 | Status: ACTIVE | COMMUNITY
Start: 1900-01-01 | End: 1900-01-01

## 2025-08-28 ENCOUNTER — OUTPATIENT (OUTPATIENT)
Dept: OUTPATIENT SERVICES | Facility: HOSPITAL | Age: 60
LOS: 1 days | End: 2025-08-28
Payer: MEDICAID

## 2025-08-28 ENCOUNTER — APPOINTMENT (OUTPATIENT)
Dept: RADIOLOGY | Facility: CLINIC | Age: 60
End: 2025-08-28

## 2025-08-28 ENCOUNTER — APPOINTMENT (OUTPATIENT)
Dept: ULTRASOUND IMAGING | Facility: CLINIC | Age: 60
End: 2025-08-28
Payer: MEDICAID

## 2025-08-28 ENCOUNTER — OUTPATIENT (OUTPATIENT)
Dept: OUTPATIENT SERVICES | Facility: HOSPITAL | Age: 60
LOS: 1 days | End: 2025-08-28

## 2025-08-28 DIAGNOSIS — N28.89 OTHER SPECIFIED DISORDERS OF KIDNEY AND URETER: ICD-10-CM

## 2025-08-28 DIAGNOSIS — Z00.8 ENCOUNTER FOR OTHER GENERAL EXAMINATION: ICD-10-CM

## 2025-08-28 DIAGNOSIS — N20.0 CALCULUS OF KIDNEY: ICD-10-CM

## 2025-08-28 PROCEDURE — 76775 US EXAM ABDO BACK WALL LIM: CPT | Mod: 26

## 2025-08-28 PROCEDURE — 74018 RADEX ABDOMEN 1 VIEW: CPT | Mod: 26

## 2025-08-28 PROCEDURE — 76775 US EXAM ABDO BACK WALL LIM: CPT

## 2025-08-28 PROCEDURE — 74018 RADEX ABDOMEN 1 VIEW: CPT

## 2025-09-08 ENCOUNTER — APPOINTMENT (OUTPATIENT)
Dept: INTERNAL MEDICINE | Facility: CLINIC | Age: 60
End: 2025-09-08
Payer: MEDICAID

## 2025-09-08 VITALS
DIASTOLIC BLOOD PRESSURE: 81 MMHG | HEART RATE: 78 BPM | OXYGEN SATURATION: 99 % | TEMPERATURE: 97.8 F | BODY MASS INDEX: 30.73 KG/M2 | SYSTOLIC BLOOD PRESSURE: 147 MMHG | HEIGHT: 64 IN | RESPIRATION RATE: 16 BRPM | WEIGHT: 180 LBS

## 2025-09-08 DIAGNOSIS — I10 ESSENTIAL (PRIMARY) HYPERTENSION: ICD-10-CM

## 2025-09-08 DIAGNOSIS — E11.9 TYPE 2 DIABETES MELLITUS W/OUT COMPLICATIONS: ICD-10-CM

## 2025-09-08 PROCEDURE — G2211 COMPLEX E/M VISIT ADD ON: CPT | Mod: NC,GC

## 2025-09-08 PROCEDURE — 99214 OFFICE O/P EST MOD 30 MIN: CPT | Mod: GC

## 2025-09-08 RX ORDER — SEMAGLUTIDE 0.68 MG/ML
2 INJECTION, SOLUTION SUBCUTANEOUS WEEKLY
Qty: 1 | Refills: 0 | Status: ACTIVE | COMMUNITY
Start: 2025-09-08 | End: 1900-01-01

## 2025-09-11 ENCOUNTER — APPOINTMENT (OUTPATIENT)
Dept: INTERNAL MEDICINE | Facility: CLINIC | Age: 60
End: 2025-09-11

## 2025-09-18 ENCOUNTER — APPOINTMENT (OUTPATIENT)
Dept: ULTRASOUND IMAGING | Facility: IMAGING CENTER | Age: 60
End: 2025-09-18
Payer: MEDICAID

## 2025-09-18 ENCOUNTER — APPOINTMENT (OUTPATIENT)
Dept: RADIOLOGY | Facility: IMAGING CENTER | Age: 60
End: 2025-09-18
Payer: MEDICAID

## 2025-09-18 PROCEDURE — 77085 DXA BONE DENSITY AXL VRT FX: CPT | Mod: 26

## (undated) DEVICE — SENSOR O2 FINGER ADULT

## (undated) DEVICE — TUBING IV SET GRAVITY 3Y 100" MACRO

## (undated) DEVICE — IRRIGATOR BIO SHIELD

## (undated) DEVICE — CATH IV SAFE BC 20G X 1.16" (PINK)

## (undated) DEVICE — TUBING CAP SET ENDO 24HR USE GI

## (undated) DEVICE — PACK IV START WITH CHG

## (undated) DEVICE — TUBING SUCTION CONN 6FT STERILE

## (undated) DEVICE — POLY TRAP ETRAP

## (undated) DEVICE — Device

## (undated) DEVICE — TUBING SUCTION 20FT

## (undated) DEVICE — SOL INJ NS 0.9% 500ML 2 PORT

## (undated) DEVICE — ELCTR GROUNDING PAD ADULT COVIDIEN

## (undated) DEVICE — CATH IV SAFE BC 22G X 1" (BLUE)

## (undated) DEVICE — SUCTION YANKAUER NO CONTROL VENT

## (undated) DEVICE — BIOPSY FORCEP RADIAL JAW 4 STANDARD WITH NEEDLE

## (undated) DEVICE — CLAMP BX HOT RAD JAW 3

## (undated) DEVICE — FOLEY HOLDER STATLOCK 2 WAY ADULT